# Patient Record
Sex: FEMALE | Race: WHITE | HISPANIC OR LATINO | Employment: FULL TIME | URBAN - METROPOLITAN AREA
[De-identification: names, ages, dates, MRNs, and addresses within clinical notes are randomized per-mention and may not be internally consistent; named-entity substitution may affect disease eponyms.]

---

## 2017-01-13 ENCOUNTER — OFFICE VISIT (OUTPATIENT)
Dept: MEDICAL GROUP | Facility: MEDICAL CENTER | Age: 22
End: 2017-01-13
Attending: INTERNAL MEDICINE
Payer: MEDICAID

## 2017-01-13 VITALS
WEIGHT: 134 LBS | OXYGEN SATURATION: 98 % | SYSTOLIC BLOOD PRESSURE: 112 MMHG | BODY MASS INDEX: 27.01 KG/M2 | TEMPERATURE: 98.6 F | RESPIRATION RATE: 20 BRPM | HEIGHT: 59 IN | DIASTOLIC BLOOD PRESSURE: 72 MMHG | HEART RATE: 88 BPM

## 2017-01-13 DIAGNOSIS — Z23 NEED FOR VACCINATION: ICD-10-CM

## 2017-01-13 DIAGNOSIS — F15.11 METHAMPHETAMINE ABUSE IN REMISSION (HCC): ICD-10-CM

## 2017-01-13 DIAGNOSIS — F41.9 ANXIETY: ICD-10-CM

## 2017-01-13 DIAGNOSIS — F43.10 PTSD (POST-TRAUMATIC STRESS DISORDER): ICD-10-CM

## 2017-01-13 DIAGNOSIS — Z11.59 SCREENING FOR VIRAL DISEASE: ICD-10-CM

## 2017-01-13 DIAGNOSIS — F33.1 MODERATE EPISODE OF RECURRENT MAJOR DEPRESSIVE DISORDER (HCC): ICD-10-CM

## 2017-01-13 PROCEDURE — 99204 OFFICE O/P NEW MOD 45 MIN: CPT | Mod: 25 | Performed by: INTERNAL MEDICINE

## 2017-01-13 PROCEDURE — 90686 IIV4 VACC NO PRSV 0.5 ML IM: CPT | Performed by: INTERNAL MEDICINE

## 2017-01-13 PROCEDURE — 99203 OFFICE O/P NEW LOW 30 MIN: CPT | Mod: 25 | Performed by: INTERNAL MEDICINE

## 2017-01-13 PROCEDURE — 90471 IMMUNIZATION ADMIN: CPT | Performed by: INTERNAL MEDICINE

## 2017-01-13 RX ORDER — CLONAZEPAM 0.5 MG/1
0.5 TABLET ORAL 2 TIMES DAILY
Qty: 30 TAB | Refills: 0 | Status: SHIPPED | OUTPATIENT
Start: 2017-01-13 | End: 2017-03-15 | Stop reason: SDUPTHER

## 2017-01-13 ASSESSMENT — PATIENT HEALTH QUESTIONNAIRE - PHQ9
CLINICAL INTERPRETATION OF PHQ2 SCORE: 6
SUM OF ALL RESPONSES TO PHQ QUESTIONS 1-9: 24
5. POOR APPETITE OR OVEREATING: 3 - NEARLY EVERY DAY
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
## 2017-01-13 NOTE — ASSESSMENT & PLAN NOTE
Patient reports baseline generalized anxiety, with intermittent panic attacks where she feels a racing heart, begins to sweat, cries, is overcome by fear, and feels like she might die. In the past month this is happened about twice. Triggers seem to be situational especially when she is out in public and shopping.

## 2017-01-13 NOTE — ASSESSMENT & PLAN NOTE
Patient reports previous history of IV methamphetamine use, now in remission. She last used in August 2015. She has never been screened for viral illnesses including hepatitis or HIV.
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
## 2017-02-21 ENCOUNTER — OFFICE VISIT (OUTPATIENT)
Dept: MEDICAL GROUP | Facility: MEDICAL CENTER | Age: 22
End: 2017-02-21
Attending: INTERNAL MEDICINE
Payer: MEDICAID

## 2017-02-21 VITALS
WEIGHT: 134 LBS | SYSTOLIC BLOOD PRESSURE: 122 MMHG | HEART RATE: 80 BPM | BODY MASS INDEX: 27.01 KG/M2 | RESPIRATION RATE: 12 BRPM | HEIGHT: 59 IN | OXYGEN SATURATION: 97 % | TEMPERATURE: 97.7 F | DIASTOLIC BLOOD PRESSURE: 80 MMHG

## 2017-02-21 DIAGNOSIS — F41.9 ANXIETY: ICD-10-CM

## 2017-02-21 DIAGNOSIS — F33.1 MODERATE EPISODE OF RECURRENT MAJOR DEPRESSIVE DISORDER (HCC): ICD-10-CM

## 2017-02-21 PROCEDURE — 99213 OFFICE O/P EST LOW 20 MIN: CPT | Performed by: INTERNAL MEDICINE

## 2017-02-21 PROCEDURE — 99214 OFFICE O/P EST MOD 30 MIN: CPT | Performed by: INTERNAL MEDICINE

## 2017-02-21 RX ORDER — BUPROPION HYDROCHLORIDE 150 MG/1
TABLET, EXTENDED RELEASE ORAL
Qty: 60 TAB | Refills: 1 | Status: SHIPPED | OUTPATIENT
Start: 2017-02-21 | End: 2017-03-15 | Stop reason: SDUPTHER

## 2017-02-21 NOTE — MR AVS SNAPSHOT
"        Isa Mcclure   2017 9:30 AM   Office Visit   MRN: [id number]    [facility]   Dept Phone:  [phone]    Description:  Female : 1995   Provider:  Theresa Goss M.D.           Reason for Visit     Follow-Up medication refill      Allergies as of 2017     No Known Allergies      You were diagnosed with     Moderate episode of recurrent major depressive disorder (CMS-[facility])   [9029075]         Vital Signs     Blood Pressure Pulse Temperature Respirations Height Weight    122/80 mmHg 80 36.5 °C (97.7 °F) 12 1.511 m (4' 11.49\") 60.782 kg (134 lb)    Body Mass Index Oxygen Saturation Breastfeeding? Smoking Status          26.62 kg/m2 97% No Former Smoker        Basic Information     Date Of Birth Sex Race Ethnicity Preferred Language    1995 Female  or   Origin (Finnish,Malawian,Sammarinese,Montenegrin, etc) English      Your appointments     Mar 15, 2017 10:10 AM   Established Patient with Theresa Goss M.D.   [facility] ([facility])    [address] [place] [id number]   [phone]           You will be receiving a confirmation call a few days before your appointment from our automated call confirmation system.              Problem List              ICD-10-CM Priority Class Noted - Resolved    Methamphetamine abuse in remission F15.10   2014 - Present    Moderate episode of recurrent major depressive disorder (CMS-HCC) F33.1   2017 - Present    Anxiety F41.9   2017 - Present    PTSD (post-traumatic stress disorder) F43.10   2017 - Present      Health Maintenance        Date Due Completion Dates    IMM HEP B VACCINE (1 of 3 - Primary Series) 1994 ---    IMM HEP A VACCINE (1 of 2 - Standard Series) 1995 ---    IMM HPV VACCINE (1 of 3 - Female 3 Dose Series) 2005 ---    IMM VARICELLA (CHICKENPOX) VACCINE (1 of 2 - 2 Dose Adolescent Series) 2007 ---    IMM DTaP/Tdap/Td Vaccine (1 - Tdap) " 6/23/2013 ---    PAP SMEAR 6/23/2015 ---            Current Immunizations     Influenza Vaccine Quad Inj (Pf) 1/13/2017      Below and/or attached are the medications your provider expects you to take. Review all of your home medications and newly ordered medications with your provider and/or pharmacist. Follow medication instructions as directed by your provider and/or pharmacist. Please keep your medication list with you and share with your provider. Update the information when medications are discontinued, doses are changed, or new medications (including over-the-counter products) are added; and carry medication information at all times in the event of emergency situations     Allergies:  No Known Allergies          Medications  Valid as of: February 21, 2017 -  9:38 AM    Generic Name Brand Name Tablet Size Instructions for use    BuPROPion HCl (TABLET SR 12 HR) WELLBUTRIN- MG Take one tablet by mouth for 4 days then increase to one tablet twice daily        ClonazePAM (Tab) KLONOPIN 0.5 MG Take 1 Tab by mouth 2 times a day.        .                 Medicines prescribed today were sent to:     [address] ([place]), [place] - [facility]    [facility] [place] [address]    Phone: [phone] Fax: [phone]    Open 24 Hours?: No      Medication refill instructions:       If your prescription bottle indicates you have medication refills left, it is not necessary to call your provider’s office. Please contact your pharmacy and they will refill your medication.    If your prescription bottle indicates you do not have any refills left, you may request refills at any time through one of the following ways: The online [employer] system (except Urgent Care), by calling your provider’s office, or by asking your pharmacy to contact your provider’s office with a refill request. Medication refills are processed only during regular business hours and may not be available until the next business day.  Your provider may request additional information or to have a follow-up visit with you prior to refilling your medication.   *Please Note: Medication refills are assigned a new Rx number when refilled electronically. Your pharmacy may indicate that no refills were authorized even though a new prescription for the same medication is available at the pharmacy. Please request the medicine by name with the pharmacy before contacting your provider for a refill.           Tectura Access Code: [email or web address]  Expires: 3/23/2017  9:38 AM    Tectura  A secure, online tool to manage your health information     [employer]’s Tectura® is a secure, online tool that connects you to your personalized health information from the privacy of your home -- day or night - making it very easy for you to manage your healthcare. Once the activation process is completed, you can even access your medical information using the Tectura adela, which is available for free in the Apple Adela store or Google Play store.     Tectura provides the following levels of access (as shown below):   My Chart Features   [facility] Doctor [facility]  Specialists [facility]  Urgent  Care Non-[facility]  Primary Care  Doctor   Email your healthcare team securely and privately 24/7 X X X    Manage appointments: schedule your next appointment; view details of past/upcoming appointments X      Request prescription refills. X      View recent personal medical records, including lab and immunizations X X X X   View health record, including health history, allergies, medications X X X X   Read reports about your outpatient visits, procedures, consult and ER notes X X X X   See your discharge summary, which is a recap of your hospital and/or ER visit that includes your diagnosis, lab results, and care plan. X X       How to register for Tectura:  1. Go to  [email or web address].  2. Click on the Sign Up Now box, which takes you to the New Member Sign Up page. You  will need to provide the following information:  a. Enter your [employer] Access Code exactly as it appears at the top of this page. (You will not need to use this code after you’ve completed the sign-up process. If you do not sign up before the expiration date, you must request a new code.)   b. Enter your date of birth.   c. Enter your home email address.   d. Click Submit, and follow the next screen’s instructions.  3. Create a [employer] ID. This will be your [employer] login ID and cannot be changed, so think of one that is secure and easy to remember.  4. Create a [employer] password. You can change your password at any time.  5. Enter your Password Reset Question and Answer. This can be used at a later time if you forget your password.   6. Enter your e-mail address. This allows you to receive e-mail notifications when new information is available in [employer].  7. Click Sign Up. You can now view your health information.    For assistance activating your [employer] account, call [phone]

## 2017-02-21 NOTE — PROGRESS NOTES
"Subjective:   Isa Mcclure is a 21 y.o. female here today for follow-up anxiety and depression    Moderate episode of recurrent major depressive disorder (CMS-HCC)  Patient was started on Zoloft 50 mg daily at her last visit. Since starting the medication, she noticed that she was having more difficulty sleeping despite taking it in the morning. She also noticed a lot of difficulty eating and nausea, which stopped when she stopped taking the medication. She did take it for 3 weeks. She reports that her depression symptoms are still very severe. She has started seeing a therapist which has been helpful, but she wants to give it more time. She is seeing them once weekly.  She is interested in trying a different medication for depression today.     Anxiety  Patient reports occasional panic attacks, has needed to use the Klonopin about 4 times in the past month. Still has most of the prescription remaining. Reports good efficacy of the medication.         Current medicines (including changes today)  Current Outpatient Prescriptions   Medication Sig Dispense Refill   • buPROPion SR (WELLBUTRIN-SR) 150 MG TABLET SR 12 HR sustained-release tablet Take one tablet by mouth for 4 days then increase to one tablet twice daily 60 Tab 1   • clonazepam (KLONOPIN) 0.5 MG Tab Take 1 Tab by mouth 2 times a day. 30 Tab 0     No current facility-administered medications for this visit.     She  has a past medical history of Drug abuse, IV and Depression.    ROS   Denies abdominal pain, diarrhea, constipation  As above in HPI     Objective:     Blood pressure 122/80, pulse 80, temperature 36.5 °C (97.7 °F), resp. rate 12, height 1.511 m (4' 11.49\"), weight 60.782 kg (134 lb), SpO2 97 %, not currently breastfeeding. Body mass index is 26.62 kg/(m^2).   Physical Exam:  Constitutional: Alert, no distress.  Skin: Warm, dry, good turgor, no rashes in visible areas.  Eye: Equal, round and reactive, conjunctiva clear, lids normal.  Psych: " Alert and oriented x3, somewhat flat affect and mood.    Assessment and Plan:   The following treatment plan was discussed    1. Moderate episode of recurrent major depressive disorder (CMS-HCC)  Given the patient had significant side effects after taking Zoloft, including insomnia and GI issues, have discontinued this medication. She has also tried citalopram and Lexapro in the past although not for very long durations, and does not remember benefit. Given this, we will move outside of the SSRI class. Will try on Wellbutrin. Follow-up in 3 weeks. Instructed patient to call if she notices any of the most common side effects associated with Wellbutrin, which we reviewed today. She will continue to attend her therapy sessions, which she feels are helpful, especially for her PTSD.  -Start Wellbutrin  mg daily ×4 days then increase to twice daily  -Follow up in 2 weeks  -Continue therapy    2. Anxiety  Panic attacks are well controlled with as needed clonazepam, patient with minimal use over the past month. Still with plenty left over from her previous prescription. Will continue until we find an effective antidepressant.   -Continue clonazepam 0.5 mg as needed  -Continue therapy    Followup: Return in about 3 weeks (around 3/14/2017) for depression.

## 2017-02-21 NOTE — ASSESSMENT & PLAN NOTE
Patient reports occasional panic attacks, has needed to use the Klonopin about 4 times in the past month. Still has most of the prescription remaining. Reports good efficacy of the medication.

## 2017-02-21 NOTE — ASSESSMENT & PLAN NOTE
Patient was started on Zoloft 50 mg daily at her last visit. Since starting the medication, she noticed that she was having more difficulty sleeping despite taking it in the morning. She also noticed a lot of difficulty eating and nausea, which stopped when she stopped taking the medication. She did take it for 3 weeks. She reports that her depression symptoms are still very severe. She has started seeing a therapist which has been helpful, but she wants to give it more time. She is seeing them once weekly.  She is interested in trying a different medication for depression today.

## 2017-03-15 ENCOUNTER — OFFICE VISIT (OUTPATIENT)
Dept: MEDICAL GROUP | Facility: MEDICAL CENTER | Age: 22
End: 2017-03-15
Attending: INTERNAL MEDICINE
Payer: MEDICAID

## 2017-03-15 VITALS
HEART RATE: 88 BPM | OXYGEN SATURATION: 100 % | TEMPERATURE: 97.5 F | RESPIRATION RATE: 14 BRPM | WEIGHT: 135.2 LBS | DIASTOLIC BLOOD PRESSURE: 62 MMHG | SYSTOLIC BLOOD PRESSURE: 100 MMHG | HEIGHT: 59 IN | BODY MASS INDEX: 27.26 KG/M2

## 2017-03-15 DIAGNOSIS — Z23 NEED FOR VACCINATION: ICD-10-CM

## 2017-03-15 DIAGNOSIS — F41.9 ANXIETY: ICD-10-CM

## 2017-03-15 DIAGNOSIS — F33.1 MODERATE EPISODE OF RECURRENT MAJOR DEPRESSIVE DISORDER (HCC): ICD-10-CM

## 2017-03-15 PROCEDURE — 90471 IMMUNIZATION ADMIN: CPT | Performed by: INTERNAL MEDICINE

## 2017-03-15 PROCEDURE — 99214 OFFICE O/P EST MOD 30 MIN: CPT | Mod: 25 | Performed by: INTERNAL MEDICINE

## 2017-03-15 PROCEDURE — 99212 OFFICE O/P EST SF 10 MIN: CPT | Performed by: INTERNAL MEDICINE

## 2017-03-15 PROCEDURE — 90715 TDAP VACCINE 7 YRS/> IM: CPT | Performed by: INTERNAL MEDICINE

## 2017-03-15 RX ORDER — BUPROPION HYDROCHLORIDE 150 MG/1
150 TABLET, EXTENDED RELEASE ORAL 2 TIMES DAILY
Qty: 60 TAB | Refills: 6 | Status: SHIPPED | OUTPATIENT
Start: 2017-03-15 | End: 2017-10-04

## 2017-03-15 RX ORDER — CLONAZEPAM 0.5 MG/1
0.5 TABLET ORAL 2 TIMES DAILY PRN
Qty: 30 TAB | Refills: 0 | Status: SHIPPED | OUTPATIENT
Start: 2017-03-15 | End: 2017-10-04

## 2017-03-15 NOTE — ASSESSMENT & PLAN NOTE
Patient reports she is doing well with the Wellbutrin. She reports improvement in her mood and has been able to do more things that she enjoys. Her fatigue has also improved. She has been taking the 150 mg dose twice daily for 3 weeks now. She continues to attend therapy which she also feels is helpful.

## 2017-03-15 NOTE — MR AVS SNAPSHOT
"        Isa Mcclure   3/15/2017 10:10 AM   Office Visit   MRN: [id number]    [facility]   Dept Phone:  [phone]    Description:  Female : 1995   Provider:  Theresa oGss M.D.           Reason for Visit     Follow-Up medication review       Allergies as of 3/15/2017     No Known Allergies      You were diagnosed with     Anxiety   [536255]       Need for vaccination   [299547]       Moderate episode of recurrent major depressive disorder (CMS-[facility])   [3725592]         Vital Signs     Blood Pressure Pulse Temperature Respirations Height Weight    100/62 mmHg 88 36.4 °C (97.5 °F) 14 1.511 m (4' 11.49\") 61.326 kg (135 lb 3.2 oz)    Body Mass Index Oxygen Saturation Last Menstrual Period Breastfeeding? Smoking Status       26.86 kg/m2 100% 02/10/2017 No Former Smoker       Basic Information     Date Of Birth Sex Race Ethnicity Preferred Language    1995 Female  or   Origin (English,Romanian,Taiwanese,Chandler, etc) English      Problem List              ICD-10-CM Priority Class Noted - Resolved    Methamphetamine abuse in remission F15.10   2014 - Present    Moderate episode of recurrent major depressive disorder (CMS-[facility]) F33.1   2017 - Present    Anxiety F41.9   2017 - Present    PTSD (post-traumatic stress disorder) F43.10   2017 - Present      Health Maintenance        Date Due Completion Dates    IMM HEP B VACCINE (1 of 3 - Primary Series) 1994 ---    IMM HEP A VACCINE (1 of 2 - Standard Series) 1995 ---    IMM HPV VACCINE (1 of 3 - Female 3 Dose Series) 2005 ---    IMM VARICELLA (CHICKENPOX) VACCINE (1 of 2 - 2 Dose Adolescent Series) 2007 ---    IMM DTaP/Tdap/Td Vaccine (1 - Tdap) 2013 ---    PAP SMEAR 2015 ---            Current Immunizations     Influenza Vaccine Quad Inj (Pf) 2017    Tdap Vaccine 3/15/2017      Below and/or attached are the medications your provider expects you to take. Review " all of your home medications and newly ordered medications with your provider and/or pharmacist. Follow medication instructions as directed by your provider and/or pharmacist. Please keep your medication list with you and share with your provider. Update the information when medications are discontinued, doses are changed, or new medications (including over-the-counter products) are added; and carry medication information at all times in the event of emergency situations     Allergies:  No Known Allergies          Medications  Valid as of: March 15, 2017 - 10:48 AM    Generic Name Brand Name Tablet Size Instructions for use    BuPROPion HCl (TABLET SR 12 HR) WELLBUTRIN- MG Take 1 Tab by mouth 2 times a day.        ClonazePAM (Tab) KLONOPIN 0.5 MG Take 1 Tab by mouth 2 times a day as needed.        .                 Medicines prescribed today were sent to:     [address] ([address]), [place] [address]    [facility] [place] ([place]) [place] [address]    Phone: [phone] Fax: [phone]    Open 24 Hours?: No      Medication refill instructions:       If your prescription bottle indicates you have medication refills left, it is not necessary to call your provider’s office. Please contact your pharmacy and they will refill your medication.    If your prescription bottle indicates you do not have any refills left, you may request refills at any time through one of the following ways: The online Pager system (except Urgent Care), by calling your provider’s office, or by asking your pharmacy to contact your provider’s office with a refill request. Medication refills are processed only during regular business hours and may not be available until the next business day. Your provider may request additional information or to have a follow-up visit with you prior to refilling your medication.   *Please Note: Medication refills are assigned a new Rx number when refilled electronically. Your pharmacy may  indicate that no refills were authorized even though a new prescription for the same medication is available at the pharmacy. Please request the medicine by name with the pharmacy before contacting your provider for a refill.           Active [employer] Access Code: Activation code not generated  Current [employer] Status: Active

## 2017-03-15 NOTE — ASSESSMENT & PLAN NOTE
Patient has had about 3 panic attacks since her last visit. She still has some clonazepam left over from her initial prescription although she is running out. As it on an as-needed basis, does not use alcohol or drugs. She finds it is very effective in relieving her anxiety symptoms.

## 2017-03-15 NOTE — PROGRESS NOTES
"Subjective:   Isa Mcclure is a 21 y.o. female here today for follow-up anxiety and depression    Moderate episode of recurrent major depressive disorder (CMS-HCC)  Patient reports she is doing well with the Wellbutrin. She reports improvement in her mood and has been able to do more things that she enjoys. Her fatigue has also improved. She has been taking the 150 mg dose twice daily for 3 weeks now. She continues to attend therapy which she also feels is helpful.    Anxiety  Patient has had about 3 panic attacks since her last visit. She still has some clonazepam left over from her initial prescription although she is running out. As it on an as-needed basis, does not use alcohol or drugs. She finds it is very effective in relieving her anxiety symptoms.         Current medicines (including changes today)  Current Outpatient Prescriptions   Medication Sig Dispense Refill   • buPROPion SR (WELLBUTRIN-SR) 150 MG TABLET SR 12 HR sustained-release tablet Take 1 Tab by mouth 2 times a day. 60 Tab 6   • clonazepam (KLONOPIN) 0.5 MG Tab Take 1 Tab by mouth 2 times a day as needed. 30 Tab 0     No current facility-administered medications for this visit.     She  has a past medical history of Drug abuse, IV and Depression.    ROS   As above in HPI     Objective:     Blood pressure 100/62, pulse 88, temperature 36.4 °C (97.5 °F), resp. rate 14, height 1.511 m (4' 11.49\"), weight 61.326 kg (135 lb 3.2 oz), last menstrual period 02/10/2017, SpO2 100 %, not currently breastfeeding. Body mass index is 26.86 kg/(m^2).   Physical Exam:  Constitutional: Alert, no distress.  Skin: Warm, dry, good turgor, no rashes in visible areas.  Eye: conjunctiva clear, lids normal.  Psych: Alert and oriented x3, normal affect, mood improved      Assessment and Plan:   The following treatment plan was discussed    1. Anxiety  Improving with as needed clonazepam and Wellbutrin as well as therapy. Refill given for clonazepam today. Patient " uses sparingly and appropriately.  - clonazepam (KLONOPIN) 0.5 MG Tab; Take 1 Tab by mouth 2 times a day as needed.  Dispense: 30 Tab; Refill: 0  -Wellbutrin  mg twice a day  -Continue therapy    2. Need for vaccination  - Tdap =>6yo IM    3. Moderate episode of recurrent major depressive disorder (CMS-HCC)  Improving with Wellbutrin, which she has been on for 3 weeks now. We will continue at current dose. Discussed that a slight dose increase to 200 mg twice a day is possible if we need to in the future. Patient will return for follow-up in 3 months or sooner if needed.  -Wellbutrin  mg twice a day  -Continue therapy  -Follow up in 3 months    4. HCM  Last Pap smear was through her previous OB/GYN when she was pregnant, which was 8 months ago. Patient reports it was normal. Records requested today.      Followup: Return in about 3 months (around 6/15/2017) for anxiety, depression.

## 2017-04-05 ENCOUNTER — OFFICE VISIT (OUTPATIENT)
Dept: MEDICAL GROUP | Facility: MEDICAL CENTER | Age: 22
End: 2017-04-05
Attending: INTERNAL MEDICINE
Payer: MEDICAID

## 2017-04-05 VITALS
SYSTOLIC BLOOD PRESSURE: 116 MMHG | HEIGHT: 59 IN | OXYGEN SATURATION: 97 % | WEIGHT: 136.2 LBS | HEART RATE: 100 BPM | RESPIRATION RATE: 14 BRPM | BODY MASS INDEX: 27.46 KG/M2 | DIASTOLIC BLOOD PRESSURE: 82 MMHG | TEMPERATURE: 97.7 F

## 2017-04-05 DIAGNOSIS — L03.319 CELLULITIS AND ABSCESS OF TRUNK: ICD-10-CM

## 2017-04-05 DIAGNOSIS — L02.219 CELLULITIS AND ABSCESS OF TRUNK: ICD-10-CM

## 2017-04-05 PROCEDURE — 99212 OFFICE O/P EST SF 10 MIN: CPT | Performed by: INTERNAL MEDICINE

## 2017-04-05 PROCEDURE — 99214 OFFICE O/P EST MOD 30 MIN: CPT | Performed by: INTERNAL MEDICINE

## 2017-04-05 RX ORDER — SULFAMETHOXAZOLE AND TRIMETHOPRIM 800; 160 MG/1; MG/1
1 TABLET ORAL 2 TIMES DAILY
Qty: 14 TAB | Refills: 0 | Status: SHIPPED | OUTPATIENT
Start: 2017-04-05 | End: 2017-04-12

## 2017-04-05 ASSESSMENT — PAIN SCALES - GENERAL: PAINLEVEL: 4=SLIGHT-MODERATE PAIN

## 2017-04-05 NOTE — PROGRESS NOTES
"Subjective:   Isa Mcclure is a 21 y.o. female here today for a painful bump under her arm.    Cellulitis and abscess of trunk  Patient reports about 3 days ago she had a razor bump underneath her armpit on the left after shaving. Over the next 2 days, it grew in size and became very painful. It is red. She reports expressing a small amount of pus from it. She has had MRSA related abscesses in the past, most recently over her chest over 6 months ago. She denies fevers and chills. The affected area is very painful.        Current medicines (including changes today)  Current Outpatient Prescriptions   Medication Sig Dispense Refill   • sulfamethoxazole-trimethoprim (BACTRIM DS) 800-160 MG tablet Take 1 Tab by mouth 2 times a day for 7 days. 14 Tab 0   • buPROPion SR (WELLBUTRIN-SR) 150 MG TABLET SR 12 HR sustained-release tablet Take 1 Tab by mouth 2 times a day. 60 Tab 6   • clonazepam (KLONOPIN) 0.5 MG Tab Take 1 Tab by mouth 2 times a day as needed. 30 Tab 0     No current facility-administered medications for this visit.     She  has a past medical history of Drug abuse, IV and Depression.    ROS   As above in HPI     Objective:     Blood pressure 116/82, pulse 100, temperature 36.5 °C (97.7 °F), resp. rate 14, height 1.511 m (4' 11.49\"), weight 61.78 kg (136 lb 3.2 oz), last menstrual period 03/05/2017, SpO2 97 %, not currently breastfeeding. Body mass index is 27.06 kg/(m^2).   Physical Exam:  Constitutional: Alert, no distress.  Skin: Warm, dry, good turgor, approx 3 cm x 2 cm indurated red, erythematous area at lower lateral aspect of left arm pit that is firm without fluctuance or drainable abscess.  Eye: conjunctiva clear, lids normal.  Psych: Alert and oriented x3, normal affect and mood.      Assessment and Plan:   The following treatment plan was discussed    1. Cellulitis and abscess of trunk  Suspect secondary to shaving. No fluctuance palpable. Area is still very firm and indurated. We'll treat with " antibiotics at this point with MRSA coverage. Discussed with patient that this area may form an abscess, and if it becomes more painful, she develops fevers or chills, or it starts to drain, that she should give us a call. If this occurs over the weekend, she needs to go to the emergency room for possible incision and drainage of the area. We discussed avoiding shaving armpits, legs, groin region, using alternative methods for hair removal.  -Bactrim DS one tablet twice daily for 7 days      Followup: Return if symptoms worsen or fail to improve.

## 2017-04-05 NOTE — ASSESSMENT & PLAN NOTE
Patient reports about 3 days ago she had a razor bump underneath her armpit on the left after shaving. Over the next 2 days, it grew in size and became very painful. It is red. She reports expressing a small amount of pus from it. She has had MRSA related abscesses in the past, most recently over her chest over 6 months ago. She denies fevers and chills. The affected area is very painful.

## 2017-04-05 NOTE — MR AVS SNAPSHOT
"        Isaalison Mcclure   2017 8:10 AM   Office Visit   MRN: [id number]    [facility]   Dept Phone:  [phone]    Description:  Female : 1995   Provider:  Theresa Goss M.D.           Reason for Visit     Follow-Up lump under left arm (possible MRSA)       Allergies as of 2017     No Known Allergies      You were diagnosed with     Cellulitis and abscess of trunk   [682.2.ICD-9-CM]         Vital Signs     Blood Pressure Pulse Temperature Respirations Height Weight    116/82 mmHg 100 36.5 °C (97.7 °F) 14 1.511 m (4' 11.49\") 61.78 kg (136 lb 3.2 oz)    Body Mass Index Oxygen Saturation Last Menstrual Period Breastfeeding? Smoking Status       27.06 kg/m2 97% 2017 No Former Smoker       Basic Information     Date Of Birth Sex Race Ethnicity Preferred Language    1995 Female  or   Origin (Albanian,North Korean,Malawian,Tajik, etc) English      Problem List              ICD-10-CM Priority Class Noted - Resolved    Methamphetamine abuse in remission F15.10   2014 - Present    Moderate episode of recurrent major depressive disorder (CMS-HCC) F33.1   2017 - Present    Anxiety F41.9   2017 - Present    PTSD (post-traumatic stress disorder) F43.10   2017 - Present    Cellulitis and abscess of trunk L03.319, L02.219   2017 - Present      Health Maintenance        Date Due Completion Dates    IMM HEP B VACCINE (1 of 3 - Primary Series) 1994 ---    IMM HEP A VACCINE (1 of 2 - Standard Series) 1995 ---    IMM HPV VACCINE (1 of 3 - Female 3 Dose Series) 2005 ---    IMM VARICELLA (CHICKENPOX) VACCINE (1 of 2 - 2 Dose Adolescent Series) 2007 ---    PAP SMEAR 2015 ---    IMM DTaP/Tdap/Td Vaccine (2 - Td) 3/15/2027 3/15/2017            Current Immunizations     Influenza Vaccine Quad Inj (Pf) 2017    Tdap Vaccine 3/15/2017      Below and/or attached are the medications your provider expects you to take. Review " all of your home medications and newly ordered medications with your provider and/or pharmacist. Follow medication instructions as directed by your provider and/or pharmacist. Please keep your medication list with you and share with your provider. Update the information when medications are discontinued, doses are changed, or new medications (including over-the-counter products) are added; and carry medication information at all times in the event of emergency situations     Allergies:  No Known Allergies          Medications  Valid as of: April 05, 2017 -  9:01 AM    Generic Name Brand Name Tablet Size Instructions for use    BuPROPion HCl (TABLET SR 12 HR) WELLBUTRIN- MG Take 1 Tab by mouth 2 times a day.        ClonazePAM (Tab) KLONOPIN 0.5 MG Take 1 Tab by mouth 2 times a day as needed.        Sulfamethoxazole-Trimethoprim (Tab) BACTRIM -160 MG Take 1 Tab by mouth 2 times a day for 7 days.        .                 Medicines prescribed today were sent to:     [facility] [address] [place] [address] (S), [place] - [address]    [address] ([place] [place] [address]    Phone: [phone] Fax: [phone]    Open 24 Hours?: No      Medication refill instructions:       If your prescription bottle indicates you have medication refills left, it is not necessary to call your provider’s office. Please contact your pharmacy and they will refill your medication.    If your prescription bottle indicates you do not have any refills left, you may request refills at any time through one of the following ways: The online WebNotes system (except Urgent Care), by calling your provider’s office, or by asking your pharmacy to contact your provider’s office with a refill request. Medication refills are processed only during regular business hours and may not be available until the next business day. Your provider may request additional information or to have a follow-up visit with you prior to refilling your medication.      *Please Note: Medication refills are assigned a new Rx number when refilled electronically. Your pharmacy may indicate that no refills were authorized even though a new prescription for the same medication is available at the pharmacy. Please request the medicine by name with the pharmacy before contacting your provider for a refill.           [employer] Access Code: Activation code not generated  Current [employer] Status: Active

## 2017-05-16 ENCOUNTER — OFFICE VISIT (OUTPATIENT)
Dept: MEDICAL GROUP | Facility: MEDICAL CENTER | Age: 22
End: 2017-05-16
Attending: INTERNAL MEDICINE
Payer: MEDICAID

## 2017-05-16 VITALS
TEMPERATURE: 98.7 F | HEART RATE: 120 BPM | RESPIRATION RATE: 16 BRPM | OXYGEN SATURATION: 98 % | SYSTOLIC BLOOD PRESSURE: 106 MMHG | BODY MASS INDEX: 27.7 KG/M2 | HEIGHT: 59 IN | DIASTOLIC BLOOD PRESSURE: 72 MMHG | WEIGHT: 137.4 LBS

## 2017-05-16 DIAGNOSIS — R21 RASH OF HANDS: ICD-10-CM

## 2017-05-16 DIAGNOSIS — J06.9 VIRAL URI WITH COUGH: ICD-10-CM

## 2017-05-16 PROBLEM — L02.219 CELLULITIS AND ABSCESS OF TRUNK: Status: RESOLVED | Noted: 2017-04-05 | Resolved: 2017-05-16

## 2017-05-16 PROBLEM — L03.319 CELLULITIS AND ABSCESS OF TRUNK: Status: RESOLVED | Noted: 2017-04-05 | Resolved: 2017-05-16

## 2017-05-16 PROCEDURE — 99214 OFFICE O/P EST MOD 30 MIN: CPT | Performed by: INTERNAL MEDICINE

## 2017-05-16 PROCEDURE — 99213 OFFICE O/P EST LOW 20 MIN: CPT | Performed by: INTERNAL MEDICINE

## 2017-05-16 RX ORDER — BENZONATATE 100 MG/1
100 CAPSULE ORAL 3 TIMES DAILY PRN
Qty: 30 CAP | Refills: 0 | Status: SHIPPED | OUTPATIENT
Start: 2017-05-16 | End: 2017-10-04

## 2017-05-16 RX ORDER — GUAIFENESIN 600 MG/1
600 TABLET, EXTENDED RELEASE ORAL EVERY 12 HOURS
Qty: 20 TAB | Refills: 0 | Status: SHIPPED | OUTPATIENT
Start: 2017-05-16 | End: 2017-10-04

## 2017-05-16 RX ORDER — CODEINE PHOSPHATE/GUAIFENESIN 10-100MG/5
5 LIQUID (ML) ORAL 3 TIMES DAILY PRN
Qty: 60 ML | Refills: 0 | Status: SHIPPED | OUTPATIENT
Start: 2017-05-16 | End: 2017-10-04

## 2017-05-16 NOTE — ASSESSMENT & PLAN NOTE
Patient presents today with 3 day history of productive cough, fevers to 102, runny nose, sore throat, nasal congestion. She has had several sick contacts with the same illness. She has been taking DayQuil and NyQuil with minimal relief. She has had some vomiting related to severe coughing. She denies difficulty breathing, shortness of breath, chest pain, diarrhea.

## 2017-05-16 NOTE — MR AVS SNAPSHOT
"        Isa Mcclure   2017 11:30 AM   Office Visit   MRN: [id number]    [facility]   Dept Phone:  [phone]    Description:  Female : 1995   Provider:  Theresa Goss M.D.           Reason for Visit     Follow-Up cold symptoms, finger pain       Allergies as of 2017     No Known Allergies      You were diagnosed with     Viral URI with cough   [716050]         Vital Signs     Blood Pressure Pulse Temperature Respirations Height Weight    106/72 mmHg 120 37.1 °C (98.7 °F) 16 1.511 m (4' 11.49\") 62.324 kg (137 lb 6.4 oz)    Body Mass Index Oxygen Saturation Last Menstrual Period Breastfeeding? Smoking Status       27.30 kg/m2 98% 05/10/2017 No Former Smoker       Basic Information     Date Of Birth Sex Race Ethnicity Preferred Language    1995 Female  or   Origin (Japanese,Luxembourger,Honduran,Chandler, etc) English      Problem List              ICD-10-CM Priority Class Noted - Resolved    Methamphetamine abuse in remission F15.10   2014 - Present    Moderate episode of recurrent major depressive disorder (CMS-HCC) F33.1   2017 - Present    Anxiety F41.9   2017 - Present    PTSD (post-traumatic stress disorder) F43.10   2017 - Present    Viral URI with cough J06.9, B97.89   2017 - Present      Health Maintenance        Date Due Completion Dates    IMM HEP B VACCINE (1 of 3 - Primary Series) 1994 ---    IMM HEP A VACCINE (1 of 2 - Standard Series) 1995 ---    IMM HPV VACCINE (1 of 3 - Female 3 Dose Series) 2005 ---    IMM VARICELLA (CHICKENPOX) VACCINE (1 of 2 - 2 Dose Adolescent Series) 2007 ---    PAP SMEAR 2015 ---    IMM DTaP/Tdap/Td Vaccine (2 - Td) 3/15/2027 3/15/2017            Current Immunizations     Influenza Vaccine Quad Inj (Pf) 2017    Tdap Vaccine 3/15/2017      Below and/or attached are the medications your provider expects you to take. Review all of your home medications and " newly ordered medications with your provider and/or pharmacist. Follow medication instructions as directed by your provider and/or pharmacist. Please keep your medication list with you and share with your provider. Update the information when medications are discontinued, doses are changed, or new medications (including over-the-counter products) are added; and carry medication information at all times in the event of emergency situations     Allergies:  No Known Allergies          Medications  Valid as of: May 16, 2017 - 11:56 AM    Generic Name Brand Name Tablet Size Instructions for use    Benzonatate (Cap) TESSALON 100 MG Take 1 Cap by mouth 3 times a day as needed for Cough.        BuPROPion HCl (TABLET SR 12 HR) WELLBUTRIN- MG Take 1 Tab by mouth 2 times a day.        ClonazePAM (Tab) KLONOPIN 0.5 MG Take 1 Tab by mouth 2 times a day as needed.        GuaiFENesin (TABLET SR 12 HR) MUCINEX 600 MG Take 1 Tab by mouth every 12 hours.        Guaifenesin-Codeine (Syrup) TUSSI-ORGANIDIN -10 MG/5ML Take 5 mL by mouth 3 times a day as needed for Cough.        Hydrocortisone (Cream) hydrocortisone 2.5 % Apply twice daily to rash on finger until resolved        .                 Medicines prescribed today were sent to:     [facility] [address] [place] [address] (S), [place] [id number] [facility] (S) [place] [address]    Phone: [phone] Fax: [phone]    Open 24 Hours?: No      Medication refill instructions:       If your prescription bottle indicates you have medication refills left, it is not necessary to call your provider’s office. Please contact your pharmacy and they will refill your medication.    If your prescription bottle indicates you do not have any refills left, you may request refills at any time through one of the following ways: The online [facility] system (except Urgent Care), by calling your provider’s office, or by asking your pharmacy to contact your provider’s office with a refill  request. Medication refills are processed only during regular business hours and may not be available until the next business day. Your provider may request additional information or to have a follow-up visit with you prior to refilling your medication.   *Please Note: Medication refills are assigned a new Rx number when refilled electronically. Your pharmacy may indicate that no refills were authorized even though a new prescription for the same medication is available at the pharmacy. Please request the medicine by name with the pharmacy before contacting your provider for a refill.           [employer] Access Code: Activation code not generated  Current [employer] Status: Active

## 2017-05-16 NOTE — PROGRESS NOTES
"Subjective:   Isa Mcclure is a 21 y.o. female here today for flulike symptoms, finger rash    Viral URI with cough  Patient presents today with 3 day history of productive cough, fevers to 102, runny nose, sore throat, nasal congestion. She has had several sick contacts with the same illness. She has been taking DayQuil and NyQuil with minimal relief. She has had some vomiting related to severe coughing. She denies difficulty breathing, shortness of breath, chest pain, diarrhea.    Rash of hands  Patient reports several months of a dry peeling cracking red rash on her middle finger of her left hand. The rash has not been present anywhere else. She has been using strong moisturizers which has not been helpful. She has not noticed it anywhere else on her body.         Current medicines (including changes today)  Current Outpatient Prescriptions   Medication Sig Dispense Refill   • benzonatate (TESSALON) 100 MG Cap Take 1 Cap by mouth 3 times a day as needed for Cough. 30 Cap 0   • guaifenesin LA (MUCINEX) 600 MG TABLET SR 12 HR Take 1 Tab by mouth every 12 hours. 20 Tab 0   • hydrocortisone 2.5 % Cream topical cream Apply twice daily to rash on finger until resolved 1 Tube 1   • guaifenesin-codeine (TUSSI-ORGANIDIN NR) 100-10 MG/5ML syrup Take 5 mL by mouth 3 times a day as needed for Cough. 60 mL 0   • buPROPion SR (WELLBUTRIN-SR) 150 MG TABLET SR 12 HR sustained-release tablet Take 1 Tab by mouth 2 times a day. 60 Tab 6   • clonazepam (KLONOPIN) 0.5 MG Tab Take 1 Tab by mouth 2 times a day as needed. 30 Tab 0     No current facility-administered medications for this visit.     She  has a past medical history of Drug abuse, IV and Depression.    ROS   As above in HPI     Objective:     Blood pressure 106/72, pulse 120, temperature 37.1 °C (98.7 °F), resp. rate 16, height 1.511 m (4' 11.49\"), weight 62.324 kg (137 lb 6.4 oz), last menstrual period 05/10/2017, SpO2 98 %, not currently breastfeeding. Body mass index " is 27.3 kg/(m^2).   Physical Exam:  Constitutional: Alert, no distress.  Skin: Warm, dry, good turgor, red scaling erythematous plaque over the palmar surface of left third finger with some cracking of the skin, extending just past the PIP joint and down to the MCP joint.  Eye: Equal, round and reactive, conjunctiva clear, lids normal.  ENMT: Lips without lesions, good dentition, oropharynx mildly injected, turbinates swollen bilaterally, TMs clear bilaterally.  Neck: Trachea midline, no masses, no thyromegaly. No cervical or supraclavicular lymphadenopathy  Respiratory: Unlabored respiratory effort, lungs clear to auscultation, no wheezes, no ronchi.  Cardiovascular: Mildly tachycardic, Normal S1, S2, no murmur, no edema.  Psych: Alert and oriented x3, normal affect and mood.      Assessment and Plan:   The following treatment plan was discussed    1. Viral URI with cough  Symptoms consistent with a viral upper respiratory infection. Discussed conservative care with patient. We will start her on Tessalon Perles for the cough, Mucinex twice daily for the next week, and nighttime codeine cough syrup. Recommended OTC Afrin for a nasal decongestant. Discussed that if symptoms are no better in the next week, she should call clinic and she may need antibiotics at that point.  - benzonatate (TESSALON) 100 MG Cap; Take 1 Cap by mouth 3 times a day as needed for Cough.  Dispense: 30 Cap; Refill: 0  - guaifenesin LA (MUCINEX) 600 MG TABLET SR 12 HR; Take 1 Tab by mouth every 12 hours.  Dispense: 20 Tab; Refill: 0  - guaifenesin-codeine (TUSSI-ORGANIDIN NR) 100-10 MG/5ML syrup; Take 5 mL by mouth 3 times a day as needed for Cough.  Dispense: 60 mL; Refill: 0  -Afrin when necessary    2. Rash of hands  Rash looks like eczema, however it has not responded to any of the topical therapies piece the patient has tried. I will try some hydrocortisone cream and if no improvement or worsening, would consider treating as a fungal  infection.  - hydrocortisone 2.5 % Cream topical cream; Apply twice daily to rash on finger until resolved  Dispense: 1 Tube; Refill: 1      Followup: Return if symptoms worsen or fail to improve.

## 2017-05-16 NOTE — Clinical Note
May 16, 2017         Patient: Isa Mcclure   YOB: 1995   Date of Visit: 5/16/2017           To Whom it May Concern:    Isa Mcclure was seen in my clinic on 5/16/2017. She may return to work on 5/18/17.    If you have any questions or concerns, please don't hesitate to call.        Sincerely,           Theresa Goss M.D.  Electronically Signed

## 2017-06-14 ENCOUNTER — OFFICE VISIT (OUTPATIENT)
Dept: MEDICAL GROUP | Facility: MEDICAL CENTER | Age: 22
End: 2017-06-14
Attending: INTERNAL MEDICINE
Payer: MEDICAID

## 2017-06-14 VITALS
SYSTOLIC BLOOD PRESSURE: 118 MMHG | OXYGEN SATURATION: 97 % | RESPIRATION RATE: 16 BRPM | HEART RATE: 86 BPM | WEIGHT: 138 LBS | TEMPERATURE: 97.2 F | HEIGHT: 59 IN | BODY MASS INDEX: 27.82 KG/M2 | DIASTOLIC BLOOD PRESSURE: 78 MMHG

## 2017-06-14 DIAGNOSIS — Z30.013 INITIATION OF DEPO PROVERA: ICD-10-CM

## 2017-06-14 LAB
INT CON NEG: NEGATIVE
INT CON POS: POSITIVE
POC URINE PREGNANCY TEST: NEGATIVE

## 2017-06-14 PROCEDURE — 81025 URINE PREGNANCY TEST: CPT | Performed by: INTERNAL MEDICINE

## 2017-06-14 PROCEDURE — 700111 HCHG RX REV CODE 636 W/ 250 OVERRIDE (IP): Performed by: INTERNAL MEDICINE

## 2017-06-14 PROCEDURE — 96372 THER/PROPH/DIAG INJ SC/IM: CPT | Performed by: INTERNAL MEDICINE

## 2017-06-14 PROCEDURE — 99213 OFFICE O/P EST LOW 20 MIN: CPT | Mod: 25 | Performed by: INTERNAL MEDICINE

## 2017-06-14 PROCEDURE — 99214 OFFICE O/P EST MOD 30 MIN: CPT | Performed by: INTERNAL MEDICINE

## 2017-06-14 RX ORDER — MEDROXYPROGESTERONE ACETATE 150 MG/ML
150 INJECTION, SUSPENSION INTRAMUSCULAR ONCE
Status: COMPLETED | OUTPATIENT
Start: 2017-06-14 | End: 2017-06-14

## 2017-06-14 RX ORDER — MEDROXYPROGESTERONE ACETATE 150 MG/ML
150 INJECTION, SUSPENSION INTRAMUSCULAR ONCE
Qty: 1 VIAL | Refills: 0 | Status: SHIPPED | OUTPATIENT
Start: 2017-06-14 | End: 2017-06-14 | Stop reason: SDUPTHER

## 2017-06-14 RX ORDER — MEDROXYPROGESTERONE ACETATE 150 MG/ML
150 INJECTION, SUSPENSION INTRAMUSCULAR ONCE
Qty: 1 VIAL | Refills: 0 | Status: SHIPPED
Start: 2017-06-14 | End: 2017-06-14

## 2017-06-14 RX ADMIN — MEDROXYPROGESTERONE ACETATE 150 MG: 150 INJECTION, SUSPENSION INTRAMUSCULAR at 10:10

## 2017-06-14 ASSESSMENT — PAIN SCALES - GENERAL: PAINLEVEL: NO PAIN

## 2017-06-14 NOTE — PROGRESS NOTES
Subjective:   Isa Mcclure is a 21 y.o. female here today for discuss birth control methods    Initiation of Depo Provera  Patient presents today for birth control counseling. She would like to start Depo-Provera Provera injections. She has been on several types of pills in the past and has difficulty remembering to take pills and would like something a little more permanent. We also discussed patches, vaginal ring, IUD, and implant. Her last menstrual period was in May. She reports very irregular cycles since she gave birth to her son 11 months ago. Pregnancy test in clinic today was negative. She is not currently sexually active. Her main goals with birth control or to even out and regulate her period as well as prevent pregnancy.         Current medicines (including changes today)  Current Outpatient Prescriptions   Medication Sig Dispense Refill   • medroxyPROGESTERone (DEPO-PROVERA) 150 MG/ML Suspension 1 mL by Intramuscular route Once for 1 dose. 1 Vial 0   • benzonatate (TESSALON) 100 MG Cap Take 1 Cap by mouth 3 times a day as needed for Cough. 30 Cap 0   • guaifenesin LA (MUCINEX) 600 MG TABLET SR 12 HR Take 1 Tab by mouth every 12 hours. 20 Tab 0   • hydrocortisone 2.5 % Cream topical cream Apply twice daily to rash on finger until resolved 1 Tube 1   • guaifenesin-codeine (TUSSI-ORGANIDIN NR) 100-10 MG/5ML syrup Take 5 mL by mouth 3 times a day as needed for Cough. 60 mL 0   • buPROPion SR (WELLBUTRIN-SR) 150 MG TABLET SR 12 HR sustained-release tablet Take 1 Tab by mouth 2 times a day. 60 Tab 6   • clonazepam (KLONOPIN) 0.5 MG Tab Take 1 Tab by mouth 2 times a day as needed. 30 Tab 0     Current Facility-Administered Medications   Medication Dose Route Frequency Provider Last Rate Last Dose   • medroxyPROGESTERone (DEPO-PROVERA) injection 150 mg  150 mg Intramuscular Once Theresa Goss M.D.         She  has a past medical history of Drug abuse, IV and Depression.    ROS   As above in HPI      "Objective:     Blood pressure 118/78, pulse 86, temperature 36.2 °C (97.2 °F), resp. rate 16, height 1.511 m (4' 11.49\"), weight 62.596 kg (138 lb), last menstrual period 05/10/2017, SpO2 97 %, not currently breastfeeding. Body mass index is 27.42 kg/(m^2).   Physical Exam:  Constitutional: Alert, no distress.  Skin: Warm, dry, good turgor, no rashes in visible areas.  Eye: Equal, round and reactive, conjunctiva clear, lids normal.  Psych: Alert and oriented x3, normal affect and mood.    Results and Imaging:   POC pregnancy test done in clinic today was negative    Assessment and Plan:   The following treatment plan was discussed    1. Initiation of Depo Provera  Risks and benefits of Low injection discussed with patient today.  Discussed possibility of weight gain and irregular bleeding as main side effects. Discussed window of 11-14 weeks for patient to return for her next injection. She does have a history of depression and I asked her to monitor her symptoms after receiving the injection as it can cause some mood changes. She is currently not taking her antidepressant. She is however following up with therapy and seeing them weekly.  - medroxyPROGESTERone (DEPO-PROVERA) injection 150 mg; 1 mL by Intramuscular route Once.  - POCT Pregnancy  - medroxyPROGESTERone (DEPO-PROVERA) 150 MG/ML Suspension; 1 mL by Intramuscular route Once for 1 dose.  Dispense: 1 Vial; Refill: 0    Followup: Return in about 3 months (around 9/14/2017), or if symptoms worsen or fail to improve, for repeat Depo shot.           "

## 2017-06-14 NOTE — ASSESSMENT & PLAN NOTE
Patient presents today for birth control counseling. She would like to start Depo-Provera Provera injections. She has been on several types of pills in the past and has difficulty remembering to take pills and would like something a little more permanent. We also discussed patches, vaginal ring, IUD, and implant. Her last menstrual period was in May. She reports very irregular cycles since she gave birth to her son 11 months ago. Pregnancy test in clinic today was negative. She is not currently sexually active. Her main goals with birth control or to even out and regulate her period as well as prevent pregnancy.

## 2017-06-14 NOTE — MR AVS SNAPSHOT
"        Isa Mcclure   2017 9:30 AM   Office Visit   MRN: [id number]    [facility]   Dept Phone:  [phone]    Description:  Female : 1995   Provider:  Theresa Goss M.D.           Reason for Visit     Contraception questions      Allergies as of 2017     No Known Allergies      You were diagnosed with     Initiation of Depo Provera   [011394]         Vital Signs     Blood Pressure Pulse Temperature Respirations Height Weight    118/78 mmHg 86 36.2 °C (97.2 °F) 16 1.511 m (4' 11.49\") 62.596 kg (138 lb)    Body Mass Index Oxygen Saturation Last Menstrual Period Breastfeeding? Smoking Status       27.42 kg/m2 97% 05/10/2017 No Former Smoker       Basic Information     Date Of Birth Sex Race Ethnicity Preferred Language    1995 Female  or   Origin (Grenadian,Moroccan,Estonian,Chandler, etc) English      Problem List              ICD-10-CM Priority Class Noted - Resolved    Methamphetamine abuse in remission F15.10   2014 - Present    Moderate episode of recurrent major depressive disorder (CMS-HCC) F33.1   2017 - Present    Anxiety F41.9   2017 - Present    PTSD (post-traumatic stress disorder) F43.10   2017 - Present    Viral URI with cough J06.9, B97.89   2017 - Present    Rash of hands R21   2017 - Present    Initiation of Depo Provera Z30.013   2017 - Present      Health Maintenance        Date Due Completion Dates    IMM HEP B VACCINE (1 of 3 - Primary Series) 1994 ---    IMM HEP A VACCINE (1 of 2 - Standard Series) 1995 ---    IMM HPV VACCINE (1 of 3 - Female 3 Dose Series) 2005 ---    IMM VARICELLA (CHICKENPOX) VACCINE (1 of 2 - 2 Dose Adolescent Series) 2007 ---    PAP SMEAR 2015 ---    IMM DTaP/Tdap/Td Vaccine (2 - Td) 3/15/2027 3/15/2017            Current Immunizations     Influenza Vaccine Quad Inj (Pf) 2017    Tdap Vaccine 3/15/2017      Below and/or attached are the " medications your provider expects you to take. Review all of your home medications and newly ordered medications with your provider and/or pharmacist. Follow medication instructions as directed by your provider and/or pharmacist. Please keep your medication list with you and share with your provider. Update the information when medications are discontinued, doses are changed, or new medications (including over-the-counter products) are added; and carry medication information at all times in the event of emergency situations     Allergies:  No Known Allergies          Medications  Valid as of: June 14, 2017 - 10:28 AM    Generic Name Brand Name Tablet Size Instructions for use    Benzonatate (Cap) TESSALON 100 MG Take 1 Cap by mouth 3 times a day as needed for Cough.        BuPROPion HCl (TABLET SR 12 HR) WELLBUTRIN- MG Take 1 Tab by mouth 2 times a day.        ClonazePAM (Tab) KLONOPIN 0.5 MG Take 1 Tab by mouth 2 times a day as needed.        GuaiFENesin (TABLET SR 12 HR) MUCINEX 600 MG Take 1 Tab by mouth every 12 hours.        Guaifenesin-Codeine (Syrup) TUSSI-ORGANIDIN -10 MG/5ML Take 5 mL by mouth 3 times a day as needed for Cough.        Hydrocortisone (Cream) hydrocortisone 2.5 % Apply twice daily to rash on finger until resolved        MedroxyPROGESTERone Acetate (Suspension) DEPO-PROVERA 150 MG/ML 1 mL by Intramuscular route Once for 1 dose.        .                 Medicines prescribed today were sent to:     [facility] [address] [place] [address] (S), [place] - [address] [id number] [facility] (S) [place] [address]    Phone: [phone] Fax: [phone]    Open 24 Hours?: No      Medication refill instructions:       If your prescription bottle indicates you have medication refills left, it is not necessary to call your provider’s office. Please contact your pharmacy and they will refill your medication.    If your prescription bottle indicates you do not have any refills left, you may request refills  at any time through one of the following ways: The online [employer] Direct ([employer]) system (except Urgent Care), by calling your provider’s office, or by asking your pharmacy to contact your provider’s office with a refill request. Medication refills are processed only during regular business hours and may not be available until the next business day. Your provider may request additional information or to have a follow-up visit with you prior to refilling your medication.   *Please Note: Medication refills are assigned a new Rx number when refilled electronically. Your pharmacy may indicate that no refills were authorized even though a new prescription for the same medication is available at the pharmacy. Please request the medicine by name with the pharmacy before contacting your provider for a refill.           [employer] Direct ([employer]) Access Code: Activation code not generated  Current [employer] ([employer]) Status: Active

## 2017-08-21 ENCOUNTER — OFFICE VISIT (OUTPATIENT)
Dept: MEDICAL GROUP | Facility: MEDICAL CENTER | Age: 22
End: 2017-08-21
Attending: NURSE PRACTITIONER
Payer: MEDICAID

## 2017-08-21 VITALS
DIASTOLIC BLOOD PRESSURE: 70 MMHG | RESPIRATION RATE: 16 BRPM | BODY MASS INDEX: 28.02 KG/M2 | HEIGHT: 59 IN | SYSTOLIC BLOOD PRESSURE: 102 MMHG | HEART RATE: 76 BPM | TEMPERATURE: 97.9 F | WEIGHT: 139 LBS | OXYGEN SATURATION: 100 %

## 2017-08-21 DIAGNOSIS — L02.02 BOIL, FACE: ICD-10-CM

## 2017-08-21 DIAGNOSIS — H57.11 ACUTE RIGHT EYE PAIN: ICD-10-CM

## 2017-08-21 PROCEDURE — 99214 OFFICE O/P EST MOD 30 MIN: CPT | Performed by: NURSE PRACTITIONER

## 2017-08-21 PROCEDURE — 99212 OFFICE O/P EST SF 10 MIN: CPT | Performed by: NURSE PRACTITIONER

## 2017-08-21 RX ORDER — MEDROXYPROGESTERONE ACETATE 150 MG/ML
INJECTION, SUSPENSION INTRAMUSCULAR
COMMUNITY
Start: 2017-06-14 | End: 2017-09-15 | Stop reason: SDUPTHER

## 2017-08-21 RX ORDER — SULFAMETHOXAZOLE AND TRIMETHOPRIM 800; 160 MG/1; MG/1
1 TABLET ORAL 2 TIMES DAILY
Qty: 20 TAB | Refills: 0 | Status: SHIPPED | OUTPATIENT
Start: 2017-08-21 | End: 2017-10-04

## 2017-08-21 RX ORDER — PREDNISONE 10 MG/1
TABLET ORAL
Qty: 12 TAB | Refills: 0 | Status: SHIPPED | OUTPATIENT
Start: 2017-08-21 | End: 2017-10-04

## 2017-08-21 ASSESSMENT — PAIN SCALES - GENERAL: PAINLEVEL: 7=MODERATE-SEVERE PAIN

## 2017-08-21 NOTE — PROGRESS NOTES
"    Chief Complaint: Right Eye brow swelling and pain.    HPI:  Isa presents to the clinic as same day appt for right eye problem/pain.  Her PCP, Dr Goss is not available to see her today.    Her PMH includes:    Anxiety  Depo-Provera Injections  Meth Abuse reportedly in Remission (IV)  Major Depressive Disorder  PTSD  Rash to hands  Viral URI w cough  Cholecystectomy  Former Smoker    Review of REcords show:  6/24/17 Last Clinic Visit (Dr Goss). For initiation of Depo Provera injections for Birth Control.     [place]  Report shows:  3/25/17 Norco 5/325 # 15  1/13/17 Clonazepam 0.5 mg # 30 by Dr Goss  ------------------------------------------------------------------      Acute right Eye Brow pain  Isa reports awoke a couple days ago with right eye swelling.   \"bump on eyebrow like a pimple\"  In past had staph boil. Thought was allergies  And tried allergy med , I think it was Claritin and \"helped a little\"  Rates pain moderate. Denies pain in eye itself or itching to eyelids  Nor drainage.  Denies fever or chills.   We discussed the apparent boil is in the area she shaves around her lateral right eyebrow   And she should consider waxing or other method.    She would like cream to apply when she shaves and we discussed trial of Bactroban.      Patient Active Problem List    Diagnosis Date Noted   • Acute right eye pain 08/21/2017   • Initiation of Depo Provera 06/14/2017   • Viral URI with cough 05/16/2017   • Rash of hands 05/16/2017   • Moderate episode of recurrent major depressive disorder (CMS-HCC) 01/13/2017   • Anxiety 01/13/2017   • PTSD (post-traumatic stress disorder) 01/13/2017   • Methamphetamine abuse in remission 01/25/2014       Allergies:Review of patient's allergies indicates no known allergies.    Current Outpatient Prescriptions   Medication Sig Dispense Refill   • sulfamethoxazole-trimethoprim (BACTRIM DS) 800-160 MG tablet Take 1 Tab by mouth 2 times a day. 20 Tab 0   • " "predniSONE (DELTASONE) 10 MG Tab 10 mg twice a day for 5 days, then 10 mg once a day for 2 days 12 Tab 0   • mupirocin (BACTROBAN) 2 % Ointment Apply small amt to skin areas before and after shaving. 1 Tube 1   • MedroxyPROGESTERone Acetate 150 MG/ML Suspension Prefilled Syringe      • benzonatate (TESSALON) 100 MG Cap Take 1 Cap by mouth 3 times a day as needed for Cough. 30 Cap 0   • guaifenesin LA (MUCINEX) 600 MG TABLET SR 12 HR Take 1 Tab by mouth every 12 hours. 20 Tab 0   • hydrocortisone 2.5 % Cream topical cream Apply twice daily to rash on finger until resolved 1 Tube 1   • guaifenesin-codeine (TUSSI-ORGANIDIN NR) 100-10 MG/5ML syrup Take 5 mL by mouth 3 times a day as needed for Cough. 60 mL 0   • buPROPion SR (WELLBUTRIN-SR) 150 MG TABLET SR 12 HR sustained-release tablet Take 1 Tab by mouth 2 times a day. 60 Tab 6   • clonazepam (KLONOPIN) 0.5 MG Tab Take 1 Tab by mouth 2 times a day as needed. 30 Tab 0     No current facility-administered medications for this visit.       Social History   Substance Use Topics   • Smoking status: Former Smoker -- 0.50 packs/day for 6 years     Types: Cigarettes     Quit date: 08/13/2015   • Smokeless tobacco: Never Used   • Alcohol Use: None       Family History   Problem Relation Age of Onset   • Hypertension Mother    • Diabetes Mother    • Psychiatry Mother    • Hypertension Father    • Psychiatry Brother        ROS:  Review of Systems   See HPI Above        Exam:  Blood pressure 102/70, pulse 76, temperature 36.6 °C (97.9 °F), resp. rate 16, height 1.511 m (4' 11.49\"), weight 63.05 kg (139 lb), SpO2 100 %.  General:  Well nourished, well developed female in NAD  HENT:Head is grossly normal. PERRL.  Neck: Supple. Trachea is midline.  Pulmonary: Clear to ausculation .  Normal effort. No rales, ronchi, or wheezing.   Cardiovascular: Regular rate and rhythm.  Abdomen-Abdomen is soft, No tenderness.  Upper extremities- Strong = . Good ROM  Lower extremities- neg for " edema, redness, tenderness.  Neuro- A & O x 4. Speech clear and appropriate.     Current medications, allergies, and problem list reviewed with patient and updated in  [facility] today.    Assessment/Plan:  1. Acute right eye pain  Pt to monitor as eye itself is not painful.  If more than eyebrow area becomes painful, and eye involved, pt to go to ER for evaluation   2. Boil, face  sulfamethoxazole-trimethoprim (BACTRIM DS) 800-160 MG tablet  Warm compresses to right lateral eyebrow area.    predniSONE (DELTASONE) 10 MG Tab BID x 5 days then once daily x 2 days.  Continue Claritin otc once daily x 1 week    mupirocin (BACTROBAN) 2 % Ointment before and after shaving eyebrows or axilla areas.   Recommended waxing or other method besides shaving to patient.   Follow up as needed with PCP, Dr Goss.  Call or return if questions, concerns, or worsening condition.

## 2017-08-21 NOTE — Clinical Note
August 21, 2017       Patient: Isa Mcclure   YOB: 1995   Date of Visit: 8/21/2017         To Whom It May Concern:    Isa Mcclure was seen in clinic for medical reasons.  Please excuse from missing work on 8/19 and 8/20/17 and may return to work on 8/23/17 without restrictions.    If you have any questions or concerns, please don't hesitate to call [phone]          Sincerely,          CORNELIO Valenzuela.  Electronically Signed

## 2017-08-21 NOTE — MR AVS SNAPSHOT
"        Isa Mcclure   2017 2:30 PM   Office Visit   MRN: [id number]    [facility]   Dept Phone:  [phone]    Description:  Female : 1995   Provider:  RAFAEL Valenzuela           Reason for Visit     Eye Problem right eye       Allergies as of 2017     No Known Allergies      You were diagnosed with     Acute right eye pain   [202203]       Boil, face   [[id number]]         Vital Signs     Blood Pressure Pulse Temperature Respirations Height Weight    102/70 mmHg 76 36.6 °C (97.9 °F) 16 1.511 m (4' 11.49\") 63.05 kg (139 lb)    Body Mass Index Oxygen Saturation Smoking Status             27.62 kg/m2 100% Former Smoker         Basic Information     Date Of Birth Sex Race Ethnicity Preferred Language    1995 Female  or   Origin (New Zealander,Tunisian,Equatorial Guinean,Chandler, etc) English      Problem List              ICD-10-CM Priority Class Noted - Resolved    Methamphetamine abuse in remission F15.10   2014 - Present    Moderate episode of recurrent major depressive disorder (CMS-HCC) F33.1   2017 - Present    Anxiety F41.9   2017 - Present    PTSD (post-traumatic stress disorder) F43.10   2017 - Present    Viral URI with cough J06.9, B97.89   2017 - Present    Rash of hands R21   2017 - Present    Initiation of Depo Provera Z30.013   2017 - Present    Acute right eye pain H57.11   2017 - Present      Health Maintenance        Date Due Completion Dates    IMM HEP B VACCINE (1 of 3 - Primary Series) 1994 ---    IMM HEP A VACCINE (1 of 2 - Standard Series) 1995 ---    IMM HPV VACCINE (1 of 3 - Female 3 Dose Series) 2005 ---    IMM VARICELLA (CHICKENPOX) VACCINE (1 of 2 - 2 Dose Adolescent Series) 2007 ---    PAP SMEAR 2015 ---    IMM INFLUENZA (1) 2017    IMM DTaP/Tdap/Td Vaccine (2 - Td) 3/15/2027 3/15/2017            Current Immunizations     Influenza Vaccine Quad Inj (Pf) " 1/13/2017    Tdap Vaccine 3/15/2017      Below and/or attached are the medications your provider expects you to take. Review all of your home medications and newly ordered medications with your provider and/or pharmacist. Follow medication instructions as directed by your provider and/or pharmacist. Please keep your medication list with you and share with your provider. Update the information when medications are discontinued, doses are changed, or new medications (including over-the-counter products) are added; and carry medication information at all times in the event of emergency situations     Allergies:  No Known Allergies          Medications  Valid as of: August 21, 2017 -  2:40 PM    Generic Name Brand Name Tablet Size Instructions for use    Benzonatate (Cap) TESSALON 100 MG Take 1 Cap by mouth 3 times a day as needed for Cough.        BuPROPion HCl (TABLET SR 12 HR) WELLBUTRIN- MG Take 1 Tab by mouth 2 times a day.        ClonazePAM (Tab) KLONOPIN 0.5 MG Take 1 Tab by mouth 2 times a day as needed.        GuaiFENesin (TABLET SR 12 HR) MUCINEX 600 MG Take 1 Tab by mouth every 12 hours.        Guaifenesin-Codeine (Syrup) TUSSI-ORGANIDIN -10 MG/5ML Take 5 mL by mouth 3 times a day as needed for Cough.        Hydrocortisone (Cream) hydrocortisone 2.5 % Apply twice daily to rash on finger until resolved        MedroxyPROGESTERone Acetate (Suspension Prefilled Syringe) MedroxyPROGESTERone Acetate 150 MG/ML         Mupirocin (Ointment) BACTROBAN 2 % Apply small amt to skin areas before and after shaving.        PredniSONE (Tab) DELTASONE 10 MG 10 mg twice a day for 5 days, then 10 mg once a day for 2 days        Sulfamethoxazole-Trimethoprim (Tab) BACTRIM -160 MG Take 1 Tab by mouth 2 times a day.        .                 Medicines prescribed today were sent to:     [facility]  [place] ([place]), [place] - [address] [place] ([place]) [place] [address]    Phone: [phone] Fax:  [phone]    Open 24 Hours?: No      Medication refill instructions:       If your prescription bottle indicates you have medication refills left, it is not necessary to call your provider’s office. Please contact your pharmacy and they will refill your medication.    If your prescription bottle indicates you do not have any refills left, you may request refills at any time through one of the following ways: The online [employer] system (except Urgent Care), by calling your provider’s office, or by asking your pharmacy to contact your provider’s office with a refill request. Medication refills are processed only during regular business hours and may not be available until the next business day. Your provider may request additional information or to have a follow-up visit with you prior to refilling your medication.   *Please Note: Medication refills are assigned a new Rx number when refilled electronically. Your pharmacy may indicate that no refills were authorized even though a new prescription for the same medication is available at the pharmacy. Please request the medicine by name with the pharmacy before contacting your provider for a refill.           [employer] Access Code: Activation code not generated  Current [employer] Status: Active

## 2017-08-21 NOTE — ASSESSMENT & PLAN NOTE
"Isa reports awoke a couple days ago with right eye swelling.   \"bump on eyebrow like a pimple\"  In past had staph boil. Thought was allergies  And tried allergy med , I think it was Claritin and \"helped a little\"  Rates pain moderate. Denies pain in eye itself or itching to eyelids  Nor drainage.  Denies fever or chills.   We discussed the apparent boil is in the area she shaves around her lateral right eyebrow   And she should consider waxing or other method.    She would like cream to apply when she shaves and we discussed trial of Bactroban.  "

## 2017-09-15 ENCOUNTER — NON-PROVIDER VISIT (OUTPATIENT)
Dept: MEDICAL GROUP | Facility: MEDICAL CENTER | Age: 22
End: 2017-09-15
Attending: FAMILY MEDICINE
Payer: MEDICAID

## 2017-09-15 PROCEDURE — 96372 THER/PROPH/DIAG INJ SC/IM: CPT | Performed by: FAMILY MEDICINE

## 2017-09-15 RX ORDER — MEDROXYPROGESTERONE ACETATE 150 MG/ML
1 INJECTION, SUSPENSION INTRAMUSCULAR ONCE
Qty: 1 SYRINGE | Refills: 0 | Status: SHIPPED | OUTPATIENT
Start: 2017-09-15 | End: 2017-09-15 | Stop reason: SDUPTHER

## 2017-09-15 RX ORDER — MEDROXYPROGESTERONE ACETATE 150 MG/ML
1 INJECTION, SUSPENSION INTRAMUSCULAR ONCE
Qty: 1 SYRINGE | Refills: 0 | Status: SHIPPED | OUTPATIENT
Start: 2017-09-15 | End: 2017-09-15

## 2017-10-04 ENCOUNTER — OFFICE VISIT (OUTPATIENT)
Dept: MEDICAL GROUP | Facility: MEDICAL CENTER | Age: 22
End: 2017-10-04
Attending: INTERNAL MEDICINE
Payer: MEDICAID

## 2017-10-04 VITALS
TEMPERATURE: 97.6 F | SYSTOLIC BLOOD PRESSURE: 120 MMHG | HEIGHT: 59 IN | RESPIRATION RATE: 16 BRPM | BODY MASS INDEX: 28.22 KG/M2 | WEIGHT: 140 LBS | DIASTOLIC BLOOD PRESSURE: 84 MMHG | OXYGEN SATURATION: 99 % | HEART RATE: 100 BPM

## 2017-10-04 DIAGNOSIS — Z23 NEED FOR VACCINATION: ICD-10-CM

## 2017-10-04 DIAGNOSIS — F41.9 ANXIETY: ICD-10-CM

## 2017-10-04 DIAGNOSIS — R21 RASH OF HANDS: ICD-10-CM

## 2017-10-04 DIAGNOSIS — F33.1 MODERATE EPISODE OF RECURRENT MAJOR DEPRESSIVE DISORDER (HCC): ICD-10-CM

## 2017-10-04 DIAGNOSIS — Z30.011 OCP (ORAL CONTRACEPTIVE PILLS) INITIATION: ICD-10-CM

## 2017-10-04 PROBLEM — H57.11 ACUTE RIGHT EYE PAIN: Status: RESOLVED | Noted: 2017-08-21 | Resolved: 2017-10-04

## 2017-10-04 PROBLEM — J06.9 VIRAL URI WITH COUGH: Status: RESOLVED | Noted: 2017-05-16 | Resolved: 2017-10-04

## 2017-10-04 PROBLEM — Z30.013 INITIATION OF DEPO PROVERA: Status: RESOLVED | Noted: 2017-06-14 | Resolved: 2017-10-04

## 2017-10-04 PROCEDURE — 90471 IMMUNIZATION ADMIN: CPT | Performed by: INTERNAL MEDICINE

## 2017-10-04 PROCEDURE — 99214 OFFICE O/P EST MOD 30 MIN: CPT | Mod: 25 | Performed by: INTERNAL MEDICINE

## 2017-10-04 PROCEDURE — 90686 IIV4 VACC NO PRSV 0.5 ML IM: CPT | Performed by: INTERNAL MEDICINE

## 2017-10-04 PROCEDURE — 99213 OFFICE O/P EST LOW 20 MIN: CPT | Performed by: INTERNAL MEDICINE

## 2017-10-04 RX ORDER — CLOBETASOL PROPIONATE 0.5 MG/G
OINTMENT TOPICAL
Qty: 30 G | Refills: 0 | Status: SHIPPED | OUTPATIENT
Start: 2017-10-04 | End: 2018-08-15

## 2017-10-04 RX ORDER — NORETHINDRONE ACETATE AND ETHINYL ESTRADIOL 1.5-30(21)
1 KIT ORAL DAILY
Qty: 28 TAB | Refills: 3 | Status: SHIPPED | OUTPATIENT
Start: 2017-10-04 | End: 2018-03-27

## 2017-10-04 RX ORDER — BUPROPION HYDROCHLORIDE 200 MG/1
200 TABLET, EXTENDED RELEASE ORAL 2 TIMES DAILY
Qty: 60 TAB | Refills: 3 | Status: SHIPPED | OUTPATIENT
Start: 2017-10-04 | End: 2017-11-07

## 2017-10-04 RX ORDER — BUSPIRONE HYDROCHLORIDE 10 MG/1
10 TABLET ORAL 2 TIMES DAILY
Qty: 60 TAB | Refills: 1 | Status: SHIPPED | OUTPATIENT
Start: 2017-10-04 | End: 2017-11-07

## 2017-10-04 ASSESSMENT — PATIENT HEALTH QUESTIONNAIRE - PHQ9
CLINICAL INTERPRETATION OF PHQ2 SCORE: 6
5. POOR APPETITE OR OVEREATING: 3 - NEARLY EVERY DAY
SUM OF ALL RESPONSES TO PHQ QUESTIONS 1-9: 24

## 2017-10-04 ASSESSMENT — PAIN SCALES - GENERAL: PAINLEVEL: 3=SLIGHT PAIN

## 2017-10-04 NOTE — ASSESSMENT & PLAN NOTE
Patient reports that her depression has worsened since she received her first Depo-Provera shot. It seems that the Wellbutrin has become less effective over time for her. She continues to see her therapist once a week which helps. PH Q9 done in clinic today was 24. She feels like she is not able to function well, having difficulty doing small things around the house and taking care of her kids, feels depressed all the time. She denies suicidal ideation. Previous medications she has tried have included Zoloft, Lexapro, and citalopram, and she had side effects including nausea and insomnia well on the Zoloft. The Lexapro and citalopram were not helpful.

## 2017-10-04 NOTE — ASSESSMENT & PLAN NOTE
Rash on patient's hand has worsened. It involves the right hand the lateral aspect of the fourth and fifth fingers as well as the right palm. There is deep fissures in the skin, peeling, raw exposed surfaces. It is itchy as well as painful. She tried the topical hydrocortisone cream which did not help much.

## 2017-10-04 NOTE — PROGRESS NOTES
"Subjective:   Isa Mcclure is a 22 y.o. female here today for Worsening anxiety and depression, worsening hand rash, would like to stop Depo-Provera    Moderate episode of recurrent major depressive disorder (CMS-HCC)  Patient reports that her depression has worsened since she received her first Depo-Provera shot. It seems that the Wellbutrin has become less effective over time for her. She continues to see her therapist once a week which helps. PH Q9 done in clinic today was 24. She feels like she is not able to function well, having difficulty doing small things around the house and taking care of her kids, feels depressed all the time. She denies suicidal ideation. Previous medications she has tried have included Zoloft, Lexapro, and citalopram, and she had side effects including nausea and insomnia well on the Zoloft. The Lexapro and citalopram were not helpful.    Anxiety  Reports worsening anxiety as her depression has gotten worse. She has used the clonazepam several times but even when she takes half a tablet of the 0.5 mg, she still feels tired and \"knocked out\" for the rest of the day. She would like to try an alternative medication for anxiety.    OCP (oral contraceptive pills) initiation  Patient states that she would like to stop using the Depo-Provera because of the worsening of her depression after her first injection and subsequent injection. She feels that she is now in the habit of taking her antidepressant daily and she would do well taking an oral contraceptive pill. She had her most recent Depo-Provera early September. She has not had a menstrual cycle since starting on the Depo-Provera.    Rash of hands  Rash on patient's hand has worsened. It involves the right hand the lateral aspect of the fourth and fifth fingers as well as the right palm. There is deep fissures in the skin, peeling, raw exposed surfaces. It is itchy as well as painful. She tried the topical hydrocortisone cream which " "did not help much.       Current medicines (including changes today)  Current Outpatient Prescriptions   Medication Sig Dispense Refill   • norethindrone-ethinyl estradiol-iron (MICROGESTIN FE1.5/30) 1.5-30 MG-MCG tablet Take 1 Tab by mouth every day. 28 Tab 3   • buPROPion (WELLBUTRIN SR) 200 MG SR tablet Take 1 Tab by mouth 2 times a day. 60 Tab 3   • busPIRone (BUSPAR) 10 MG Tab Take 1 Tab by mouth 2 times a day. 60 Tab 1   • clobetasol (TEMOVATE) 0.05 % Ointment Apply pea sized amount to rash on hand twice daily as needed. 30 g 0     No current facility-administered medications for this visit.      She  has a past medical history of Depression and Drug abuse, IV.    ROS   As above in HPI     Objective:     Blood pressure 120/84, pulse 100, temperature 36.4 °C (97.6 °F), resp. rate 16, height 1.511 m (4' 11.49\"), weight 63.5 kg (140 lb), SpO2 99 %, not currently breastfeeding. Body mass index is 27.81 kg/m².   Physical Exam:  Constitutional: Alert, no distress.  Skin: Warm, dry, good turgor, Scaling fissured skin over lateral aspect of fourth and fifth finger on right hand as well as right palm consistent with dyshidrotic eczema.  Eye: Equal, round and reactive, conjunctiva clear, lids normal.  Psych: Alert and oriented x3, flat affect, depressed mood.      Assessment and Plan:   The following treatment plan was discussed    1. Moderate episode of recurrent major depressive disorder (CMS-HCC)  Uncontrolled after patient started on Depo-Provera. I recommended we stop this although she just had an injection in September. Given that she had success with Wellbutrin prior to the Depo-Provera, we plan to increase her dose. She will follow up in 4 weeks. She will continue therapy.  -Increase Wellbutrin to 200 mg twice daily  -Continue therapy  - Patient has been identified as being depressed and appropriate orders and counseling have been given  -Follow up in 4 weeks    2. Need for vaccination  - Flu Quad Inj >3 Year " Pre-Filled PF    3. Anxiety  Uncontrolled. Patient would like to add a daily medication for anxiety. We discussed stopping the clonazepam. We will start BuSpar  -Start BuSpar 10 mg twice a day  -Discontinue clonazepam  -Follow up in 4 weeks    4. OCP (oral contraceptive pills) initiation  Discussed with patient that in December, once the Depo-Provera wears off, she can start taking the oral contraceptive pills. Prescription given today.  -Start OCPs beginning of December    5. Rash of hands  Looks like dyshidrotic eczema. We will treat with a more potent topical corticosteroid. Advised patient to let me know if no improvement in symptoms over the next week with the use of the clobetasol.  -Topical clobetasol ointment twice a day ×1 week, patient to report if no improvement in symptoms      Followup: Return in about 4 weeks (around 11/1/2017) for depression, anxiety.

## 2017-10-04 NOTE — ASSESSMENT & PLAN NOTE
"Reports worsening anxiety as her depression has gotten worse. She has used the clonazepam several times but even when she takes half a tablet of the 0.5 mg, she still feels tired and \"knocked out\" for the rest of the day. She would like to try an alternative medication for anxiety.  "

## 2017-10-04 NOTE — ASSESSMENT & PLAN NOTE
Patient states that she would like to stop using the Depo-Provera because of the worsening of her depression after her first injection and subsequent injection. She feels that she is now in the habit of taking her antidepressant daily and she would do well taking an oral contraceptive pill. She had her most recent Depo-Provera early September. She has not had a menstrual cycle since starting on the Depo-Provera.

## 2017-11-07 ENCOUNTER — OFFICE VISIT (OUTPATIENT)
Dept: MEDICAL GROUP | Facility: MEDICAL CENTER | Age: 22
End: 2017-11-07
Attending: INTERNAL MEDICINE
Payer: MEDICAID

## 2017-11-07 VITALS
SYSTOLIC BLOOD PRESSURE: 122 MMHG | TEMPERATURE: 98 F | DIASTOLIC BLOOD PRESSURE: 80 MMHG | HEART RATE: 100 BPM | HEIGHT: 59 IN | WEIGHT: 146 LBS | RESPIRATION RATE: 16 BRPM | OXYGEN SATURATION: 97 % | BODY MASS INDEX: 29.43 KG/M2

## 2017-11-07 DIAGNOSIS — R21 RASH OF HANDS: ICD-10-CM

## 2017-11-07 DIAGNOSIS — F33.1 MODERATE EPISODE OF RECURRENT MAJOR DEPRESSIVE DISORDER (HCC): ICD-10-CM

## 2017-11-07 DIAGNOSIS — F17.201 TOBACCO USE DISORDER, MILD, IN SUSTAINED REMISSION: ICD-10-CM

## 2017-11-07 DIAGNOSIS — F41.9 ANXIETY: ICD-10-CM

## 2017-11-07 PROBLEM — Z30.011 OCP (ORAL CONTRACEPTIVE PILLS) INITIATION: Status: RESOLVED | Noted: 2017-06-14 | Resolved: 2017-11-07

## 2017-11-07 PROCEDURE — 99213 OFFICE O/P EST LOW 20 MIN: CPT | Performed by: INTERNAL MEDICINE

## 2017-11-07 PROCEDURE — 99214 OFFICE O/P EST MOD 30 MIN: CPT | Performed by: INTERNAL MEDICINE

## 2017-11-07 RX ORDER — BUPROPION HYDROCHLORIDE 150 MG/1
150 TABLET, EXTENDED RELEASE ORAL 2 TIMES DAILY
Qty: 60 TAB | Refills: 3 | Status: SHIPPED | OUTPATIENT
Start: 2017-11-07 | End: 2018-01-18 | Stop reason: SDUPTHER

## 2017-11-07 RX ORDER — ESCITALOPRAM OXALATE 10 MG/1
TABLET ORAL
Qty: 30 TAB | Refills: 1 | Status: SHIPPED | OUTPATIENT
Start: 2017-11-07 | End: 2018-01-18 | Stop reason: SDUPTHER

## 2017-11-07 ASSESSMENT — PATIENT HEALTH QUESTIONNAIRE - PHQ9
CLINICAL INTERPRETATION OF PHQ2 SCORE: 6
5. POOR APPETITE OR OVEREATING: 3 - NEARLY EVERY DAY
SUM OF ALL RESPONSES TO PHQ QUESTIONS 1-9: 22

## 2017-11-07 ASSESSMENT — PAIN SCALES - GENERAL: PAINLEVEL: NO PAIN

## 2017-11-08 NOTE — ASSESSMENT & PLAN NOTE
Reports resolution of the eczema on her hands after using the clobetasol cream once. Still has plenty on hand in case she needs to use it again.

## 2017-11-08 NOTE — PROGRESS NOTES
Subjective:   Isa Mcclure is a 22 y.o. female here today for Follow-up depression    Tobacco use disorder, mild, in sustained remission  Patient has been able to stop smoking and feels that Wellbutrin has been helpful for her with this. Although it has not been working for her depression, she would like to stay on it for smoking prevention.    Rash of hands  Reports resolution of the eczema on her hands after using the clobetasol cream once. Still has plenty on hand in case she needs to use it again.    Moderate episode of recurrent major depressive disorder (CMS-HCC)  Presents for follow-up after last month we increased her Wellbutrin from 150 mg twice a day to 200 mg twice a day. She reports very little change in symptoms although her pH Q9 score did decrease by 3 points. At this point, she would like to try a different medication. We have had her on the Zoloft in the past but she reported difficulty with eating, nausea, and insomnia when she was on it.     Anxiety  Patient was started on BuSpar at her last visit but she states that she felt nauseous when taking this medication and she stopped after about a week.       Current medicines (including changes today)  Current Outpatient Prescriptions   Medication Sig Dispense Refill   • escitalopram (LEXAPRO) 10 MG Tab Take 1/2 tablet daily for one week then increase to one full tablet 30 Tab 1   • buPROPion SR (WELLBUTRIN-SR) 150 MG TABLET SR 12 HR sustained-release tablet Take 1 Tab by mouth 2 times a day. 60 Tab 3   • clobetasol (TEMOVATE) 0.05 % Ointment Apply pea sized amount to rash on hand twice daily as needed. 30 g 0   • norethindrone-ethinyl estradiol-iron (MICROGESTIN FE1.5/30) 1.5-30 MG-MCG tablet Take 1 Tab by mouth every day. 28 Tab 3     No current facility-administered medications for this visit.      She  has a past medical history of Anxiety; Depression; and Drug abuse, IV.    ROS   As above in HPI     Objective:     Blood pressure 122/80, pulse  "100, temperature 36.7 °C (98 °F), resp. rate 16, height 1.511 m (4' 11.49\"), weight 66.2 kg (146 lb), SpO2 97 %, not currently breastfeeding. Body mass index is 29.01 kg/m².   Physical Exam:  Constitutional: Alert, no distress.  Skin: Warm, dry, good turgor, no rashes in visible areas.  Eye: Equal, round and reactive, conjunctiva clear, lids normal.  Psych: Alert and oriented x3, depressed mood, denies suicidal ideations.      Assessment and Plan:   The following treatment plan was discussed    1. Moderate episode of recurrent major depressive disorder (CMS-HCC)  Uncontrolled with max dose of well-being returned. We will switch patient to Lexapro but she would like to stay on the Wellbutrin as it has helped with smoking cessation. We will decrease her to the 150 mg twice daily dose.  -Start Lexapro 10 mg one half tablet daily for one week then increase to one full tablet daily  -Follow up 4 weeks  - Patient has been identified as being depressed and appropriate orders and counseling have been given    2. Tobacco use disorder, mild, in sustained remission  Stable, patient to continue Wellbutrin 150 mg twice a day    3. Rash of hands  Improved with clobetasol. Patient will use as needed.    Followup: Return in about 4 weeks (around 12/5/2017), or if symptoms worsen or fail to improve, for depression.         "

## 2017-11-08 NOTE — ASSESSMENT & PLAN NOTE
Presents for follow-up after last month we increased her Wellbutrin from 150 mg twice a day to 200 mg twice a day. She reports very little change in symptoms although her pH Q9 score did decrease by 3 points. At this point, she would like to try a different medication. We have had her on the Zoloft in the past but she reported difficulty with eating, nausea, and insomnia when she was on it.

## 2017-11-08 NOTE — ASSESSMENT & PLAN NOTE
Patient was started on BuSpar at her last visit but she states that she felt nauseous when taking this medication and she stopped after about a week.

## 2017-11-08 NOTE — ASSESSMENT & PLAN NOTE
Patient has been able to stop smoking and feels that Wellbutrin has been helpful for her with this. Although it has not been working for her depression, she would like to stay on it for smoking prevention.

## 2018-01-18 ENCOUNTER — OFFICE VISIT (OUTPATIENT)
Dept: MEDICAL GROUP | Facility: MEDICAL CENTER | Age: 23
End: 2018-01-18
Attending: INTERNAL MEDICINE
Payer: MEDICAID

## 2018-01-18 VITALS
OXYGEN SATURATION: 99 % | BODY MASS INDEX: 29.64 KG/M2 | HEART RATE: 102 BPM | SYSTOLIC BLOOD PRESSURE: 118 MMHG | TEMPERATURE: 98.9 F | HEIGHT: 59 IN | WEIGHT: 147 LBS | RESPIRATION RATE: 16 BRPM | DIASTOLIC BLOOD PRESSURE: 78 MMHG

## 2018-01-18 DIAGNOSIS — R68.89 FLU-LIKE SYMPTOMS: ICD-10-CM

## 2018-01-18 LAB
FLUAV+FLUBV AG SPEC QL IA: NEGATIVE
INT CON NEG: NEGATIVE
INT CON POS: POSITIVE

## 2018-01-18 PROCEDURE — 99214 OFFICE O/P EST MOD 30 MIN: CPT | Performed by: INTERNAL MEDICINE

## 2018-01-18 PROCEDURE — 99213 OFFICE O/P EST LOW 20 MIN: CPT | Performed by: INTERNAL MEDICINE

## 2018-01-18 RX ORDER — BUPROPION HYDROCHLORIDE 150 MG/1
150 TABLET, EXTENDED RELEASE ORAL 2 TIMES DAILY
Qty: 60 TAB | Refills: 5 | Status: SHIPPED | OUTPATIENT
Start: 2018-01-18 | End: 2018-03-27

## 2018-01-18 RX ORDER — ESCITALOPRAM OXALATE 10 MG/1
10 TABLET ORAL DAILY
Qty: 30 TAB | Refills: 5 | Status: SHIPPED | OUTPATIENT
Start: 2018-01-18 | End: 2018-03-27

## 2018-01-18 RX ORDER — ONDANSETRON 4 MG/1
4 TABLET, ORALLY DISINTEGRATING ORAL EVERY 6 HOURS PRN
Qty: 10 TAB | Refills: 0 | Status: SHIPPED | OUTPATIENT
Start: 2018-01-18 | End: 2018-06-26

## 2018-01-18 RX ORDER — ONDANSETRON 4 MG/1
4 TABLET, ORALLY DISINTEGRATING ORAL EVERY 6 HOURS PRN
Qty: 10 TAB | Refills: 0 | Status: SHIPPED | OUTPATIENT
Start: 2018-01-18 | End: 2018-01-18 | Stop reason: SDUPTHER

## 2018-01-18 ASSESSMENT — PAIN SCALES - GENERAL: PAINLEVEL: 3=SLIGHT PAIN

## 2018-01-18 NOTE — ASSESSMENT & PLAN NOTE
Patient reports that starting yesterday, she developed acute onset of diarrhea and had about 4 bowel movements very quickly. Following that she had nausea with vomiting and she hasn't been able to keep down food or water since last night. She also reports that she has not urinated since last night. She is complaining of fevers and chills although she hasn't taken her temperature at home. She's had a very mild cough and sore throat as well as some nasal congestion. She did get her flu shot this year. She denies any sick contacts.

## 2018-01-18 NOTE — PROGRESS NOTES
"Subjective:   Isa Mcclure is a 22 y.o. female here today for diarrhea, nausea/vomiting, inability to tolerate liquids, cough    Flu-like symptoms  Patient reports that starting yesterday, she developed acute onset of diarrhea and had about 4 bowel movements very quickly. Following that she had nausea with vomiting and she hasn't been able to keep down food or water since last night. She also reports that she has not urinated since last night. She is complaining of fevers and chills although she hasn't taken her temperature at home. She's had a very mild cough and sore throat as well as some nasal congestion. She did get her flu shot this year. She denies any sick contacts.        Current medicines (including changes today)  Current Outpatient Prescriptions   Medication Sig Dispense Refill   • ondansetron (ZOFRAN ODT) 4 MG TABLET DISPERSIBLE Take 1 Tab by mouth every 6 hours as needed for Nausea. 10 Tab 0   • escitalopram (LEXAPRO) 10 MG Tab Take 1 Tab by mouth every day. 30 Tab 5   • buPROPion SR (WELLBUTRIN-SR) 150 MG TABLET SR 12 HR sustained-release tablet Take 1 Tab by mouth 2 times a day. 60 Tab 5   • norethindrone-ethinyl estradiol-iron (MICROGESTIN FE1.5/30) 1.5-30 MG-MCG tablet Take 1 Tab by mouth every day. 28 Tab 3   • clobetasol (TEMOVATE) 0.05 % Ointment Apply pea sized amount to rash on hand twice daily as needed. 30 g 0     No current facility-administered medications for this visit.      She  has a past medical history of Anxiety; Depression; and Drug abuse, IV.    ROS   As above in HPI     Objective:     Blood pressure 118/78, pulse (!) 102, temperature 37.2 °C (98.9 °F), resp. rate 16, height 1.511 m (4' 11.49\"), weight 66.7 kg (147 lb), SpO2 99 %, not currently breastfeeding. Body mass index is 29.21 kg/m².   Physical Exam:  Constitutional: Alert, no distress.  Skin: Warm, dry, good turgor, no rashes in visible areas.  ENMT: Lips without lesions, good dentition, oropharynx clear  Neck: Trachea " midline, no masses, no thyromegaly. No cervical or supraclavicular lymphadenopathy  Respiratory: Unlabored respiratory effort, lungs clear to auscultation, no wheezes, no ronchi.  Cardiovascular: Normal S1, S2, no murmur  Abdomen: Soft, mild tenderness to palpation over epigastric region without rebound or guarding, no masses, no hepatosplenomegaly.      POC Influenza A/B done in clinic today was negative    Assessment and Plan:   The following treatment plan was discussed    1. Flu-like symptoms  Negative influenza testing. Most likely viral syndrome. I have given her some Zofran to take and encouraged her to drink plenty of fluids. We discussed that with the Zofran if she is still unable to tolerate liquids and has not been able to urinate, she needs to go to the emergency room later this evening. She expressed understanding.  - ondansetron (ZOFRAN ODT) 4 MG TABLET DISPERSIBLE; Take 1 Tab by mouth every 6 hours as needed for Nausea.  Dispense: 10 Tab; Refill: 0  - POCT Influenza A/B        Followup: Return if symptoms worsen or fail to improve, for PAP.

## 2018-03-27 ENCOUNTER — HOSPITAL ENCOUNTER (OUTPATIENT)
Facility: MEDICAL CENTER | Age: 23
End: 2018-03-27
Attending: INTERNAL MEDICINE
Payer: MEDICAID

## 2018-03-27 ENCOUNTER — OFFICE VISIT (OUTPATIENT)
Dept: MEDICAL GROUP | Facility: MEDICAL CENTER | Age: 23
End: 2018-03-27
Attending: INTERNAL MEDICINE
Payer: MEDICAID

## 2018-03-27 VITALS
SYSTOLIC BLOOD PRESSURE: 122 MMHG | TEMPERATURE: 97.9 F | HEART RATE: 98 BPM | RESPIRATION RATE: 16 BRPM | OXYGEN SATURATION: 98 % | HEIGHT: 59 IN | DIASTOLIC BLOOD PRESSURE: 80 MMHG | WEIGHT: 154 LBS | BODY MASS INDEX: 31.04 KG/M2

## 2018-03-27 DIAGNOSIS — N89.8 VAGINAL DISCHARGE: ICD-10-CM

## 2018-03-27 DIAGNOSIS — E66.9 OBESITY (BMI 30-39.9): ICD-10-CM

## 2018-03-27 DIAGNOSIS — N92.6 MISSED PERIOD: ICD-10-CM

## 2018-03-27 DIAGNOSIS — F33.1 MODERATE EPISODE OF RECURRENT MAJOR DEPRESSIVE DISORDER (HCC): ICD-10-CM

## 2018-03-27 PROBLEM — R68.89 FLU-LIKE SYMPTOMS: Status: RESOLVED | Noted: 2018-01-18 | Resolved: 2018-03-27

## 2018-03-27 LAB
INT CON NEG: NEGATIVE
INT CON POS: POSITIVE
POC URINE PREGNANCY TEST: NEGATIVE

## 2018-03-27 PROCEDURE — 99213 OFFICE O/P EST LOW 20 MIN: CPT | Performed by: INTERNAL MEDICINE

## 2018-03-27 PROCEDURE — 87660 TRICHOMONAS VAGIN DIR PROBE: CPT

## 2018-03-27 PROCEDURE — 87480 CANDIDA DNA DIR PROBE: CPT

## 2018-03-27 PROCEDURE — 99214 OFFICE O/P EST MOD 30 MIN: CPT | Performed by: INTERNAL MEDICINE

## 2018-03-27 PROCEDURE — 87591 N.GONORRHOEAE DNA AMP PROB: CPT

## 2018-03-27 PROCEDURE — 87491 CHLMYD TRACH DNA AMP PROBE: CPT

## 2018-03-27 PROCEDURE — 87510 GARDNER VAG DNA DIR PROBE: CPT

## 2018-03-27 RX ORDER — NORETHINDRONE ACETATE AND ETHINYL ESTRADIOL 1.5-30(21)
1 KIT ORAL DAILY
Qty: 28 TAB | Refills: 11 | Status: SHIPPED | OUTPATIENT
Start: 2018-03-27 | End: 2018-08-15

## 2018-03-27 ASSESSMENT — PAIN SCALES - GENERAL: PAINLEVEL: 3=SLIGHT PAIN

## 2018-03-27 NOTE — ASSESSMENT & PLAN NOTE
Patient reports that about a month ago she stopped taking her Lexapro because she started having thoughts about what her kids would do if she was no longer here, and she felt like it was affecting her mood too much. At this point, she would like to stay off of medication for depression. She currently denies suicidal ideation. She stopped seeing her therapist due to lack of childcare although she felt like this was very helpful and she would love to go back. She is interested in possibly trying medical marijuana for depression and anxiety.

## 2018-03-27 NOTE — ASSESSMENT & PLAN NOTE
Patient reports that her last period was very light and abnormal and she is wondering if she might be pregnant as she has been off of her birth control pills and has been sexually active. She has not been using condoms or any other type of birth control.

## 2018-03-27 NOTE — ASSESSMENT & PLAN NOTE
Patient reports a series of UTIs over the past several months for which she was treated in the emergency room. She states that following this, she was having more vaginal discharge that has been foul smelling for the past month. She has been sexually active with one male partner and does report some mild dyspareunia as well as pelvic discomfort. She denies dysuria or hematuria currently.

## 2018-03-27 NOTE — PROGRESS NOTES
Subjective:   Isa Mcclure is a 22 y.o. female here today for vaginal discharge, follow up depression, weight gain    Vaginal discharge  Patient reports a series of UTIs over the past several months for which she was treated in the emergency room. She states that following this, she was having more vaginal discharge that has been foul smelling for the past month. She has been sexually active with one male partner and does report some mild dyspareunia as well as pelvic discomfort. She denies dysuria or hematuria currently.     Moderate episode of recurrent major depressive disorder (CMS-[facility])  Patient reports that about a month ago she stopped taking her Lexapro because she started having thoughts about what her kids would do if she was no longer here, and she felt like it was affecting her mood too much. At this point, she would like to stay off of medication for depression. She currently denies suicidal ideation. She stopped seeing her therapist due to lack of childcare although she felt like this was very helpful and she would love to go back. She is interested in possibly trying medical marijuana for depression and anxiety.     Missed period  Patient reports that her last period was very light and abnormal and she is wondering if she might be pregnant as she has been off of her birth control pills and has been sexually active. She has not been using condoms or any other type of birth control.    Obesity (BMI 30-39.9)  Patient reports that she has been gaining weight despite her efforts to lose. She states that it started after the birth of her son. Since her last visit 2 months ago, she has gained 7 pounds. She is going to start weight watchers tomorrow. States that she has been going to the gym and doing aerobics, yoga, and weight lifting for about a month but hasn't noticed any change in her weight.         Current medicines (including changes today)  Current Outpatient Prescriptions   Medication Sig Dispense  "Refill   • norethindrone-ethinyl estradiol-iron (MICROGESTIN FE1.5/30) 1.5-30 MG-MCG tablet Take 1 Tab by mouth every day. 28 Tab 11   • ondansetron (ZOFRAN ODT) 4 MG TABLET DISPERSIBLE Take 1 Tab by mouth every 6 hours as needed for Nausea. 10 Tab 0   • clobetasol (TEMOVATE) 0.05 % Ointment Apply pea sized amount to rash on hand twice daily as needed. 30 g 0     No current facility-administered medications for this visit.      She  has a past medical history of Anxiety; Depression; and Drug abuse, IV.    ROS   Denies abdominal pain, dysuria  As above in HPI     Objective:     Blood pressure 122/80, pulse 98, temperature 36.6 °C (97.9 °F), resp. rate 16, height 1.511 m (4' 11.49\"), weight 69.9 kg (154 lb), SpO2 98 %, not currently breastfeeding. Body mass index is 30.6 kg/m².   Physical Exam:  Constitutional: Alert, no distress.  Skin: Warm, dry, good turgor, no rashes in visible areas.  Eye: Equal, round and reactive, conjunctiva clear, lids normal.  Psych: Alert and oriented x3, normal affect and mood.      Results and Imaging:   POC pregnancy test in clinic today was negative.    Assessment and Plan:   The following treatment plan was discussed    1. Vaginal discharge  Patient self collected swabs for vaginal pathogens as well as GC chlamydia. We'll contact her with results.  - VAGINAL PATHOGENS DNA PANEL; Future  - CHLAMYDIA/GC PCR URINE OR SWAB; Future    2. Moderate episode of recurrent major depressive disorder (CMS-HCC)  Patient would like to remain off of medication at this time. I have encouraged her to reestablish with her therapist as this was very beneficial. She would like to look into medical marijuana and I recommended CBD oil as opposed to other options. We discussed that if her symptoms are worsening or she starts to develop any type of suicidal ideation that she needs to follow up immediately.  -Patient will reestablish with therapy and remain off medications for now which is her preference    3. " Missed period  Negative pregnancy test. Reassurance provided.  - POCT Pregnancy    4. Obesity (BMI 30-39.9)  Discussed healthy diet, exercise. Patient will start weight watchers.  - Patient identified as having weight management issue.  Appropriate orders and counseling given.        Followup: Return if symptoms worsen or fail to improve.

## 2018-03-27 NOTE — ASSESSMENT & PLAN NOTE
Patient reports that she has been gaining weight despite her efforts to lose. She states that it started after the birth of her son. Since her last visit 2 months ago, she has gained 7 pounds. She is going to start weight watchers tomorrow. States that she has been going to the gym and doing aerobics, yoga, and weight lifting for about a month but hasn't noticed any change in her weight.

## 2018-03-28 ENCOUNTER — TELEPHONE (OUTPATIENT)
Dept: MEDICAL GROUP | Facility: MEDICAL CENTER | Age: 23
End: 2018-03-28

## 2018-03-28 LAB
C TRACH DNA SPEC QL NAA+PROBE: NEGATIVE
CANDIDA DNA VAG QL PROBE+SIG AMP: NEGATIVE
G VAGINALIS DNA VAG QL PROBE+SIG AMP: POSITIVE
N GONORRHOEA DNA SPEC QL NAA+PROBE: NEGATIVE
SPECIMEN SOURCE: NORMAL
T VAGINALIS DNA VAG QL PROBE+SIG AMP: NEGATIVE

## 2018-03-28 RX ORDER — METRONIDAZOLE 500 MG/1
500 TABLET ORAL 2 TIMES DAILY
Qty: 14 TAB | Refills: 0 | Status: SHIPPED | OUTPATIENT
Start: 2018-03-28 | End: 2018-04-04

## 2018-03-28 NOTE — TELEPHONE ENCOUNTER
----- Message from Theresa Goss M.D. sent at 3/28/2018  4:14 PM PDT -----  Please inform patient her test came back positive for bacterial vaginosis which is an overgrowth of the normal bacteria in the vaginal region.  It is not sexually transmitted.  She was negative for yeast.  She should start taking metronidazole 500 mg twice daily for 1 week which I will send to her pharmacy.  We do not have the results of her gonorrhea and chlamydia testing but will notify her when we do.    Theresa Goss M.D.

## 2018-03-29 ENCOUNTER — TELEPHONE (OUTPATIENT)
Dept: MEDICAL GROUP | Facility: MEDICAL CENTER | Age: 23
End: 2018-03-29

## 2018-03-29 NOTE — TELEPHONE ENCOUNTER
----- Message from Theresa Goss M.D. sent at 3/29/2018  7:31 AM PDT -----  Please inform patient her testing for gonorrhea and chlamydia came back negative.    Theresa Goss M.D.

## 2018-05-29 ENCOUNTER — OFFICE VISIT (OUTPATIENT)
Dept: MEDICAL GROUP | Facility: MEDICAL CENTER | Age: 23
End: 2018-05-29
Attending: FAMILY MEDICINE
Payer: MEDICAID

## 2018-05-29 VITALS
RESPIRATION RATE: 14 BRPM | DIASTOLIC BLOOD PRESSURE: 78 MMHG | TEMPERATURE: 98.3 F | OXYGEN SATURATION: 96 % | HEIGHT: 59 IN | SYSTOLIC BLOOD PRESSURE: 120 MMHG | HEART RATE: 88 BPM | BODY MASS INDEX: 31.25 KG/M2 | WEIGHT: 155 LBS

## 2018-05-29 DIAGNOSIS — M54.2 PAIN IN NECK: ICD-10-CM

## 2018-05-29 PROCEDURE — 99212 OFFICE O/P EST SF 10 MIN: CPT | Performed by: FAMILY MEDICINE

## 2018-05-29 PROCEDURE — 99213 OFFICE O/P EST LOW 20 MIN: CPT | Performed by: FAMILY MEDICINE

## 2018-05-29 RX ORDER — CYCLOBENZAPRINE HCL 10 MG
10 TABLET ORAL 3 TIMES DAILY PRN
Qty: 30 TAB | Refills: 0 | Status: SHIPPED | OUTPATIENT
Start: 2018-05-29 | End: 2018-06-26

## 2018-05-29 RX ORDER — METHYLPREDNISOLONE 4 MG/1
4 TABLET ORAL DAILY
Qty: 1 KIT | Refills: 0 | Status: SHIPPED | OUTPATIENT
Start: 2018-05-29 | End: 2018-06-26

## 2018-05-29 NOTE — PROGRESS NOTES
Chief Complaint:   Chief Complaint   Patient presents with   • Neck Pain     sprain       HPI:Established patient of Dr. Goss new to me  Isa Mcclure is a 22 y.o. female who presents for follow-up and evaluation of acute neck pain after ER visit to [facility]        Pain in neck     Patient said about 3 days ago she was at home trying to stretch and all of a sudden she felt like a strain or something popped in her neck and she felt like she stretched her muscle. After that she started to have this pain and stiffness in the muscle area on the left side of her neck radiating to the upper back region on the left side, she went to emergency room at [facility] and she was told that it was a muscle strain. I have no records available from [facility] ([place] At this time, she said she was treated with naproxen and some muscle relaxants, she says is not helping her pain and that's why she is here for further evaluation. Describes the pain as 7/10  Patient denies fever or nausea, vomiting. Denies back pain. No other symptoms suggestive of systemic infection    Past medical history, family history, social history and medications reviewed and updated in the record. Today  Current medications, problem list and allergies reviewed in [facility] . Today  Health maintenance topics are reviewed and updated. Today    Patient Active Problem List    Diagnosis Date Noted   • Vaginal discharge 03/27/2018   • Missed period 03/27/2018   • Obesity (BMI 30-39.9) 03/27/2018   • Tobacco use disorder, mild, in sustained remission 11/07/2017   • Rash of hands 05/16/2017   • Moderate episode of recurrent major depressive disorder (HCC) 01/13/2017   • Anxiety 01/13/2017   • PTSD (post-traumatic stress disorder) 01/13/2017   • Methamphetamine abuse in remission 01/25/2014     Family History   Problem Relation Age of Onset   • Hypertension Mother    • Diabetes Mother    • Psychiatry Mother    • Hypertension Father    •  Psychiatry Brother      Social History     Social History   • Marital status: Single     Spouse name: N/A   • Number of children: N/A   • Years of education: N/A     Occupational History   • Not on file.     Social History Main Topics   • Smoking status: Former Smoker     Packs/day: 0.50     Years: 6.00     Types: Cigarettes     Quit date: 8/13/2015   • Smokeless tobacco: Never Used   • Alcohol use No   • Drug use: No      Comment: quit meth Aug 2015, used IV   • Sexual activity: Not Currently     Other Topics Concern   • Not on file     Social History Narrative   • No narrative on file     Current Outpatient Prescriptions   Medication Sig Dispense Refill   • MethylPREDNISolone (MEDROL DOSEPAK) 4 MG Tablet Therapy Pack Take 1 Tab by mouth every day. 1 Kit 0   • cyclobenzaprine (FLEXERIL) 10 MG Tab Take 1 Tab by mouth 3 times a day as needed. 30 Tab 0   • norethindrone-ethinyl estradiol-iron (MICROGESTIN FE1.5/30) 1.5-30 MG-MCG tablet Take 1 Tab by mouth every day. 28 Tab 11   • ondansetron (ZOFRAN ODT) 4 MG TABLET DISPERSIBLE Take 1 Tab by mouth every 6 hours as needed for Nausea. 10 Tab 0   • clobetasol (TEMOVATE) 0.05 % Ointment Apply pea sized amount to rash on hand twice daily as needed. 30 g 0     No current facility-administered medications for this visit.          Current Outpatient Prescriptions   Medication Sig Dispense Refill   • MethylPREDNISolone (MEDROL DOSEPAK) 4 MG Tablet Therapy Pack Take 1 Tab by mouth every day. 1 Kit 0   • cyclobenzaprine (FLEXERIL) 10 MG Tab Take 1 Tab by mouth 3 times a day as needed. 30 Tab 0   • norethindrone-ethinyl estradiol-iron (MICROGESTIN FE1.5/30) 1.5-30 MG-MCG tablet Take 1 Tab by mouth every day. 28 Tab 11   • ondansetron (ZOFRAN ODT) 4 MG TABLET DISPERSIBLE Take 1 Tab by mouth every 6 hours as needed for Nausea. 10 Tab 0   • clobetasol (TEMOVATE) 0.05 % Ointment Apply pea sized amount to rash on hand twice daily as needed. 30 g 0     No current facility-administered  "medications for this visit.          Review Of Systems  As documented in HPI above  PHYSICAL EXAMINATION:    /78   Pulse 88   Temp 36.8 °C (98.3 °F)   Resp 14   Ht 1.499 m (4' 11\")   Wt 70.3 kg (155 lb)   SpO2 96%   BMI 31.31 kg/m²   Gen.: Well-developed, well-nourished, overweight, no apparent distress, pleasant and cooperative with the examination  HEENT: Normocephalic/atraumatic, sinuses nontender with palpation, TMs clear, nares patent with pink mucosa and clear rhinorrhea, oropharynx clear  Neck: Noted mild discomfort when I was pressing on the muscles on the back of the left side of the neck, no neck rigidity, negative Babinski. No signs of meningeal irritation  Cor: Regular rate and rhythm without murmur gallop or rub  Lungs: Clear to auscultation with equal breath sounds bilaterally. No wheezes, rhonchi.    Extremities: No cyanosis, clubbing or edema          ASSESSMENT/Plan:  1. Pain in neck  Acute pain, likely muscular in origin, associated with some inflammation. I will treat patient with Medrol Dosepak. Continue muscle relaxation. With the use of Flexeril up to 3 times a day as needed. Follow-up with PCP if not resolved, will get records from .    MethylPREDNISolone (MEDROL DOSEPAK) 4 MG Tablet Therapy Pack    cyclobenzaprine (FLEXERIL) 10 MG Tab       Please note that this dictation was created using voice recognition software. I have made every reasonable attempt to correct obvious errors but there may be errors of grammar and content that I may have overlooked prior to finalization of this note.       "

## 2018-06-26 ENCOUNTER — OFFICE VISIT (OUTPATIENT)
Dept: MEDICAL GROUP | Facility: MEDICAL CENTER | Age: 23
End: 2018-06-26
Attending: INTERNAL MEDICINE
Payer: MEDICAID

## 2018-06-26 ENCOUNTER — HOSPITAL ENCOUNTER (OUTPATIENT)
Facility: MEDICAL CENTER | Age: 23
End: 2018-06-26
Attending: INTERNAL MEDICINE
Payer: MEDICAID

## 2018-06-26 VITALS
HEIGHT: 59 IN | TEMPERATURE: 98.5 F | BODY MASS INDEX: 31.04 KG/M2 | HEART RATE: 86 BPM | RESPIRATION RATE: 16 BRPM | SYSTOLIC BLOOD PRESSURE: 102 MMHG | DIASTOLIC BLOOD PRESSURE: 70 MMHG | OXYGEN SATURATION: 98 % | WEIGHT: 154 LBS

## 2018-06-26 DIAGNOSIS — L02.91 ABSCESS: ICD-10-CM

## 2018-06-26 DIAGNOSIS — N30.00 ACUTE CYSTITIS WITHOUT HEMATURIA: ICD-10-CM

## 2018-06-26 DIAGNOSIS — N89.8 VAGINAL ITCHING: ICD-10-CM

## 2018-06-26 DIAGNOSIS — R30.0 DYSURIA: ICD-10-CM

## 2018-06-26 PROBLEM — F17.201 TOBACCO USE DISORDER, MILD, IN SUSTAINED REMISSION: Status: RESOLVED | Noted: 2017-11-07 | Resolved: 2018-06-26

## 2018-06-26 LAB
APPEARANCE UR: CLEAR
BILIRUB UR STRIP-MCNC: NORMAL MG/DL
COLOR UR AUTO: YELLOW
GLUCOSE UR STRIP.AUTO-MCNC: NEGATIVE MG/DL
KETONES UR STRIP.AUTO-MCNC: NEGATIVE MG/DL
LEUKOCYTE ESTERASE UR QL STRIP.AUTO: NORMAL
NITRITE UR QL STRIP.AUTO: NEGATIVE
PH UR STRIP.AUTO: 8 [PH] (ref 5–8)
PROT UR QL STRIP: NEGATIVE MG/DL
RBC UR QL AUTO: NORMAL
SP GR UR STRIP.AUTO: 1.01
UROBILINOGEN UR STRIP-MCNC: NEGATIVE MG/DL

## 2018-06-26 PROCEDURE — 87591 N.GONORRHOEAE DNA AMP PROB: CPT

## 2018-06-26 PROCEDURE — 99213 OFFICE O/P EST LOW 20 MIN: CPT | Performed by: INTERNAL MEDICINE

## 2018-06-26 PROCEDURE — 87186 SC STD MICRODIL/AGAR DIL: CPT

## 2018-06-26 PROCEDURE — 87480 CANDIDA DNA DIR PROBE: CPT

## 2018-06-26 PROCEDURE — 87510 GARDNER VAG DNA DIR PROBE: CPT

## 2018-06-26 PROCEDURE — 87086 URINE CULTURE/COLONY COUNT: CPT

## 2018-06-26 PROCEDURE — 87660 TRICHOMONAS VAGIN DIR PROBE: CPT

## 2018-06-26 PROCEDURE — 99214 OFFICE O/P EST MOD 30 MIN: CPT | Performed by: INTERNAL MEDICINE

## 2018-06-26 PROCEDURE — 87077 CULTURE AEROBIC IDENTIFY: CPT

## 2018-06-26 PROCEDURE — 87491 CHLMYD TRACH DNA AMP PROBE: CPT

## 2018-06-26 RX ORDER — NITROFURANTOIN 25; 75 MG/1; MG/1
100 CAPSULE ORAL 2 TIMES DAILY
Qty: 14 CAP | Refills: 0 | Status: SHIPPED | OUTPATIENT
Start: 2018-06-26 | End: 2018-06-26

## 2018-06-26 RX ORDER — SULFAMETHOXAZOLE AND TRIMETHOPRIM 800; 160 MG/1; MG/1
1 TABLET ORAL 2 TIMES DAILY
Qty: 14 TAB | Refills: 0 | Status: SHIPPED | OUTPATIENT
Start: 2018-06-26 | End: 2018-07-03

## 2018-06-26 ASSESSMENT — PAIN SCALES - GENERAL: PAINLEVEL: 5=MODERATE PAIN

## 2018-06-26 NOTE — ASSESSMENT & PLAN NOTE
Patient complains of painful urination and urinary hesitancy and urgency for the past few days. She denies fevers and chills. She's having some mild suprapubic discomfort. Denies hematuria or flank pain. She has a history of recurrent UTIs, last treated in April with Bactrim.

## 2018-06-27 DIAGNOSIS — N89.8 VAGINAL ITCHING: ICD-10-CM

## 2018-06-27 DIAGNOSIS — R30.0 DYSURIA: ICD-10-CM

## 2018-06-27 NOTE — ASSESSMENT & PLAN NOTE
Patient reports that since the start of her urinary symptoms, she has also been experiencing vaginal itching and a foul smell in the area. She denies significant discharge. She has been sexually active with one male partner and has not been using condoms.

## 2018-06-27 NOTE — ASSESSMENT & PLAN NOTE
Patient history of recurrent abscesses, sometimes in her armpits or groin but sometimes on her abdomen and face. Today, she has a painful raised indurated area over her lower jaw that has been there for several days. She reports a lesion right next to it that drained quite a bit of pus a few days ago. She denies fevers and chills. She also has a smaller indurated painful lesion on her abdomen for the past few days.

## 2018-06-27 NOTE — PROGRESS NOTES
"Subjective:   Isa Mcclure is a 23 y.o. female here today for dysuria and vaginal itching x 3 days    Dysuria  Patient complains of painful urination and urinary hesitancy and urgency for the past few days. She denies fevers and chills. She's having some mild suprapubic discomfort. Denies hematuria or flank pain. She has a history of recurrent UTIs, last treated in April with Bactrim.     Abscess  Patient history of recurrent abscesses, sometimes in her armpits or groin but sometimes on her abdomen and face. Today, she has a painful raised indurated area over her lower jaw that has been there for several days. She reports a lesion right next to it that drained quite a bit of pus a few days ago. She denies fevers and chills. She also has a smaller indurated painful lesion on her abdomen for the past few days.    Vaginal itching  Patient reports that since the start of her urinary symptoms, she has also been experiencing vaginal itching and a foul smell in the area. She denies significant discharge. She has been sexually active with one male partner and has not been using condoms.       Current medicines (including changes today)  Current Outpatient Prescriptions   Medication Sig Dispense Refill   • sulfamethoxazole-trimethoprim (BACTRIM DS) 800-160 MG tablet Take 1 Tab by mouth 2 times a day for 7 days. 14 Tab 0   • norethindrone-ethinyl estradiol-iron (MICROGESTIN FE1.5/30) 1.5-30 MG-MCG tablet Take 1 Tab by mouth every day. 28 Tab 11   • clobetasol (TEMOVATE) 0.05 % Ointment Apply pea sized amount to rash on hand twice daily as needed. 30 g 0     No current facility-administered medications for this visit.      She  has a past medical history of Anxiety; Depression; and Drug abuse, IV.    ROS   As above in HPI  Denies dental pain, abscess     Objective:     Blood pressure 102/70, pulse 86, temperature 36.9 °C (98.5 °F), resp. rate 16, height 1.499 m (4' 11.02\"), weight 69.9 kg (154 lb), SpO2 98 %, not currently " breastfeeding. Body mass index is 31.09 kg/m².   Physical Exam:  Constitutional: Alert, no distress.  Skin: Warm, dry, good turgor, approx 1 cm raised indurated lesion over right chin without fluctuance, approx 2mm raised inflammed pimple on abdomen  Abdomen: Soft, mild tenderness to palpation in suprapubic region, no rebound or guarding no masses, no hepatosplenomegaly.  Back: no CVA tenderness      Results and Imaging:   POC UA in clinic positive for moderate leuk esterase and blood, negative nitrate    Assessment and Plan:   The following treatment plan was discussed    1. Dysuria  - POCT Urinalysis  - URINE CULTURE(NEW); Future    2. Vaginal itching  - VAGINAL PATHOGENS DNA PANEL; Future  - CHLAMYDIA/GC PCR URINE OR SWAB; Future    3. Abscess  Because the facial lesion is large, I have opted to treat her with Bactrim for 7 days for dual therapy both to help with this and her UTI.  - sulfamethoxazole-trimethoprim (BACTRIM DS) 800-160 MG tablet; Take 1 Tab by mouth 2 times a day for 7 days.  Dispense: 14 Tab; Refill: 0    4. Acute cystitis without hematuria  Extended duration because we are also treating for her skin infection  - sulfamethoxazole-trimethoprim (BACTRIM DS) 800-160 MG tablet; Take 1 Tab by mouth 2 times a day for 7 days.  Dispense: 14 Tab; Refill: 0        Followup: Return if symptoms worsen or fail to improve.

## 2018-06-28 RX ORDER — METRONIDAZOLE 500 MG/1
500 TABLET ORAL 2 TIMES DAILY
Qty: 14 TAB | Refills: 0 | Status: SHIPPED | OUTPATIENT
Start: 2018-06-28 | End: 2018-07-05

## 2018-06-29 LAB
BACTERIA UR CULT: ABNORMAL
BACTERIA UR CULT: ABNORMAL
SIGNIFICANT IND 70042: ABNORMAL
SITE SITE: ABNORMAL
SOURCE SOURCE: ABNORMAL

## 2018-08-15 ENCOUNTER — OFFICE VISIT (OUTPATIENT)
Dept: MEDICAL GROUP | Facility: MEDICAL CENTER | Age: 23
End: 2018-08-15
Attending: INTERNAL MEDICINE
Payer: MEDICAID

## 2018-08-15 VITALS
WEIGHT: 161 LBS | BODY MASS INDEX: 32.46 KG/M2 | DIASTOLIC BLOOD PRESSURE: 82 MMHG | HEIGHT: 59 IN | OXYGEN SATURATION: 100 % | SYSTOLIC BLOOD PRESSURE: 108 MMHG | TEMPERATURE: 98.2 F | RESPIRATION RATE: 16 BRPM | HEART RATE: 86 BPM

## 2018-08-15 DIAGNOSIS — F33.1 MODERATE EPISODE OF RECURRENT MAJOR DEPRESSIVE DISORDER (HCC): ICD-10-CM

## 2018-08-15 DIAGNOSIS — F30.8 OTHER MANIC EPISODES (HCC): ICD-10-CM

## 2018-08-15 DIAGNOSIS — J01.90 ACUTE NON-RECURRENT SINUSITIS, UNSPECIFIED LOCATION: ICD-10-CM

## 2018-08-15 DIAGNOSIS — F41.9 ANXIETY: ICD-10-CM

## 2018-08-15 PROBLEM — L02.91 ABSCESS: Status: RESOLVED | Noted: 2018-06-26 | Resolved: 2018-08-15

## 2018-08-15 PROBLEM — R21 RASH OF HANDS: Status: RESOLVED | Noted: 2017-05-16 | Resolved: 2018-08-15

## 2018-08-15 PROBLEM — R30.0 DYSURIA: Status: RESOLVED | Noted: 2018-06-26 | Resolved: 2018-08-15

## 2018-08-15 PROBLEM — N89.8 VAGINAL ITCHING: Status: RESOLVED | Noted: 2018-06-26 | Resolved: 2018-08-15

## 2018-08-15 PROBLEM — N92.6 MISSED PERIOD: Status: RESOLVED | Noted: 2018-03-27 | Resolved: 2018-08-15

## 2018-08-15 PROBLEM — N30.00 ACUTE CYSTITIS: Status: RESOLVED | Noted: 2018-06-26 | Resolved: 2018-08-15

## 2018-08-15 PROCEDURE — 99214 OFFICE O/P EST MOD 30 MIN: CPT | Performed by: INTERNAL MEDICINE

## 2018-08-15 PROCEDURE — 99213 OFFICE O/P EST LOW 20 MIN: CPT | Performed by: INTERNAL MEDICINE

## 2018-08-15 RX ORDER — AMOXICILLIN AND CLAVULANATE POTASSIUM 875; 125 MG/1; MG/1
1 TABLET, FILM COATED ORAL 2 TIMES DAILY
Qty: 14 TAB | Refills: 0 | Status: SHIPPED | OUTPATIENT
Start: 2018-08-15 | End: 2018-08-22

## 2018-08-15 ASSESSMENT — PAIN SCALES - GENERAL: PAINLEVEL: 6=MODERATE PAIN

## 2018-08-15 NOTE — ASSESSMENT & PLAN NOTE
Patient reports worsening depression lately.  States that she has been having more suicidal thoughts.  States that she is not sure if she would act on them.  She lives with her parents and her children, and feels like if she had an emergency psychologically, she could go to her parents for help.  She was using medical marijuana to try to control her depression but states that it has not been helping.  In the past, we have tried Zoloft but this caused nausea, difficulty eating, and insomnia at the 50 mg dose.  We have tried Wellbutrin which worked initially but then stopped being effective after several months.  We have also tried Lexapro and she had suicidal thoughts while taking it.  She was previously seeing a therapist but stopped after that therapist the left the office.  She is interested in getting reestablished with someone.

## 2018-08-15 NOTE — ASSESSMENT & PLAN NOTE
Patient reports that for the past 2 weeks, she has had severe nasal congestion and frontal headaches.  Denies fevers or chills.  Denies sick contacts.  States that she feels increased pressure behind her eyes especially when leaning forward.  Has been taking over-the-counter ibuprofen to help with the headaches and Afrin to relieve the congestion.  Has tried Mucinex which has not been helpful.  Denies ear pain but reports sensation of fullness and muffled hearing.  Has started to have a mild sore throat related to postnasal drip with some nausea when the postnasal drip is copious.  Has started to have a mild cough productive of mucus with some mild shortness of breath as well.

## 2018-08-15 NOTE — ASSESSMENT & PLAN NOTE
Patient reports that over the past several months, she has been experiencing episodes where she feels like she has to much energy.  She is unable to sleep during this time.  She reports impulsive behaviors.  States that they happen rarely but when they do, they scare her because she has so much energy.  One example she gives today is that during an energy episode, she cooked way too much food even though she knew that there was no one there to eat it.  She has never been evaluated formally by psychiatry.  We have treated her in the past with numerous antidepressants which have not been effective.

## 2018-08-15 NOTE — PROGRESS NOTES
Subjective:   Isa Mcclure is a 23 y.o. female here today for concern for sinus infection ×2 weeks, worsening depression with energetic episodes    Other manic episodes (HCC)  Patient reports that over the past several months, she has been experiencing episodes where she feels like she has to much energy.  She is unable to sleep during this time.  She reports impulsive behaviors.  States that they happen rarely but when they do, they scare her because she has so much energy.  One example she gives today is that during an energy episode, she cooked way too much food even though she knew that there was no one there to eat it.  She has never been evaluated formally by psychiatry.  We have treated her in the past with numerous antidepressants which have not been effective.    Moderate episode of recurrent major depressive disorder (CMS-HCC) (HCC)  Patient reports worsening depression lately.  States that she has been having more suicidal thoughts.  States that she is not sure if she would act on them.  She lives with her parents and her children, and feels like if she had an emergency psychologically, she could go to her parents for help.  She was using medical marijuana to try to control her depression but states that it has not been helping.  In the past, we have tried Zoloft but this caused nausea, difficulty eating, and insomnia at the 50 mg dose.  We have tried Wellbutrin which worked initially but then stopped being effective after several months.  We have also tried Lexapro and she had suicidal thoughts while taking it.  She was previously seeing a therapist but stopped after that therapist the left the office.  She is interested in getting reestablished with someone.    Acute sinusitis  Patient reports that for the past 2 weeks, she has had severe nasal congestion and frontal headaches.  Denies fevers or chills.  Denies sick contacts.  States that she feels increased pressure behind her eyes especially when  "leaning forward.  Has been taking over-the-counter ibuprofen to help with the headaches and Afrin to relieve the congestion.  Has tried Mucinex which has not been helpful.  Denies ear pain but reports sensation of fullness and muffled hearing.  Has started to have a mild sore throat related to postnasal drip with some nausea when the postnasal drip is copious.  Has started to have a mild cough productive of mucus with some mild shortness of breath as well.       Current medicines (including changes today)  Current Outpatient Prescriptions   Medication Sig Dispense Refill   • amoxicillin-clavulanate (AUGMENTIN) 875-125 MG Tab Take 1 Tab by mouth 2 times a day for 7 days. 14 Tab 0     No current facility-administered medications for this visit.      She  has a past medical history of Anxiety; Depression; and Drug abuse, IV.    ROS   As above in HPI     Objective:     Blood pressure 108/82, pulse 86, temperature 36.8 °C (98.2 °F), resp. rate 16, height 1.499 m (4' 11.02\"), weight 73 kg (161 lb), last menstrual period 08/08/2018, SpO2 100 %. Body mass index is 32.5 kg/m².   Physical Exam:  Constitutional: Alert, no distress.  Skin: Warm, dry, good turgor, no rashes in visible areas.  Eye: Equal, round and reactive, conjunctiva clear, lids normal.  ENMT: Lips without lesions, good dentition, oropharynx clear, TM's clear bilaterally, turbinates injected bilaterally, tenderness to palpation over bilateral frontal sinuses  Neck: Trachea midline, no masses, no thyromegaly. No cervical or supraclavicular lymphadenopathy  Respiratory: Unlabored respiratory effort, lungs clear to auscultation, no wheezes, no ronchi.  Psych: Alert and oriented x3, depressed mood, flat affect, currently denies suicidal ideation.      Assessment and Plan:   The following treatment plan was discussed    1. Other manic episodes (HCC)  This is new for the patient for the past several months.  She has never been formally evaluated by psychiatry which " I think would be wise at this point given her failure to respond to 3 antidepressants in the past.  I would like her to be screened for bipolar or other underlying mental health disorder.  She would also like to reestablish with therapy and I have placed a referral for that today.  - REFERRAL TO PSYCHIATRY  - REFERRAL TO PSYCHOLOGY    2. Moderate episode of recurrent major depressive disorder (HCC)  As discussed above.  I am concerned because she is having more suicidal thoughts although she denies any currently and feels that she does have a support system at home.  We discussed that if things get very bad, she can always go to the emergency room for help.  - REFERRAL TO PSYCHIATRY  - REFERRAL TO PSYCHOLOGY    3. Anxiety  - REFERRAL TO PSYCHIATRY  - REFERRAL TO PSYCHOLOGY    4. Acute non-recurrent sinusitis, unspecified location  - amoxicillin-clavulanate (AUGMENTIN) 875-125 MG Tab; Take 1 Tab by mouth 2 times a day for 7 days.  Dispense: 14 Tab; Refill: 0        Followup: Return if symptoms worsen or fail to improve.

## 2018-11-06 ENCOUNTER — OFFICE VISIT (OUTPATIENT)
Dept: MEDICAL GROUP | Facility: MEDICAL CENTER | Age: 23
End: 2018-11-06
Attending: INTERNAL MEDICINE
Payer: MEDICAID

## 2018-11-06 VITALS
HEIGHT: 59 IN | RESPIRATION RATE: 16 BRPM | SYSTOLIC BLOOD PRESSURE: 120 MMHG | DIASTOLIC BLOOD PRESSURE: 78 MMHG | HEART RATE: 60 BPM | TEMPERATURE: 99.2 F | BODY MASS INDEX: 33.06 KG/M2 | WEIGHT: 164 LBS | OXYGEN SATURATION: 100 %

## 2018-11-06 DIAGNOSIS — F99 PSYCHIATRIC DISORDER: ICD-10-CM

## 2018-11-06 PROCEDURE — 99212 OFFICE O/P EST SF 10 MIN: CPT | Performed by: INTERNAL MEDICINE

## 2018-11-06 PROCEDURE — 99213 OFFICE O/P EST LOW 20 MIN: CPT | Performed by: NURSE PRACTITIONER

## 2018-11-06 RX ORDER — QUETIAPINE FUMARATE 200 MG/1
200 TABLET, FILM COATED ORAL 2 TIMES DAILY
Qty: 60 TAB | Refills: 0 | Status: SHIPPED | OUTPATIENT
Start: 2018-11-06 | End: 2019-04-03

## 2018-11-06 RX ORDER — HYDROXYZINE PAMOATE 100 MG
100 CAPSULE ORAL
COMMUNITY
Start: 2018-10-10 | End: 2018-11-06 | Stop reason: SDUPTHER

## 2018-11-06 RX ORDER — HYDROXYZINE PAMOATE 100 MG
100 CAPSULE ORAL
Qty: 30 CAP | Refills: 0 | Status: SHIPPED | OUTPATIENT
Start: 2018-11-06 | End: 2019-04-03

## 2018-11-06 RX ORDER — PRAZOSIN HYDROCHLORIDE 1 MG/1
CAPSULE ORAL
COMMUNITY
Start: 2018-10-05 | End: 2018-11-06

## 2018-11-06 RX ORDER — HYDROXYZINE PAMOATE 50 MG/1
CAPSULE ORAL
COMMUNITY
Start: 2018-09-19 | End: 2018-11-06

## 2018-11-06 RX ORDER — QUETIAPINE FUMARATE 100 MG/1
TABLET, FILM COATED ORAL
COMMUNITY
Start: 2018-09-19 | End: 2018-11-06

## 2018-11-06 RX ORDER — QUETIAPINE FUMARATE 200 MG/1
200 TABLET, FILM COATED ORAL 2 TIMES DAILY
COMMUNITY
Start: 2018-09-26 | End: 2018-11-06 | Stop reason: SDUPTHER

## 2018-11-06 RX ORDER — MIRTAZAPINE 15 MG/1
TABLET, FILM COATED ORAL
COMMUNITY
Start: 2018-10-05 | End: 2018-11-06

## 2018-11-06 RX ORDER — CLONAZEPAM 1 MG/1
1 TABLET ORAL 3 TIMES DAILY
COMMUNITY
Start: 2018-09-26 | End: 2019-07-21

## 2018-11-06 NOTE — PROGRESS NOTES
"Chief Complaint:   Chief Complaint   Patient presents with   • Medication Refill     psych meds. Unable to keep appts with [facility], will be seen at [facility] on 11/26       HPI:  Isa is here today for a follow-up on Psych Meds  Her PCP, Dr. Goss is not available to see her today.    Her PMh includes Bipolar, PTSD, Anxiety, manic Episodes  IVDU hx/substance abuse.    Review of Records:  8/15/18 Clinic appt w Dr Goss for reported manic episodes, depression, sinus infection. Had failed numerous SSRI meds and even medical marijuana. Referred to Psychiatry and Psychology.  RX Amoxicillin.    Nv  Report:  9/26/18 Clonazepam 0.5 mg # 20 by Giovana Zavala( Psychiatry-[facility]))  --------------------------------------------------------------------------------  Psychiatric disorder  Hx as above.  Reports she missed a number of appt's w Psychiatry- Dr Fisher and is now unable to get Psych Meds from that office. Is seeing Counselor at [facility] and reports they are making appt for Isa to see their Medication Management Psychiatry Provider.    States had \"terrible\" experience w [facility] Psychiatry and not willing to go back to see them for Psychiatry.    Has been out of these meds for 4 days and feels nausea.  Denies suicidal ideation. Here w young son.  She reports she needs refills of Seroquel and Hydroxyzine until she can re-establish w Psychiatry.  [facility].      Patient Active Problem List    Diagnosis Date Noted   • Psychiatric disorder 11/06/2018   • Other manic episodes (HCC) 08/15/2018   • Acute sinusitis 08/15/2018   • Obesity (BMI 30-39.9) 03/27/2018   • Moderate episode of recurrent major depressive disorder (HCC) 01/13/2017   • Anxiety 01/13/2017   • PTSD (post-traumatic stress disorder) 01/13/2017   • Methamphetamine abuse in remission ([facility]) 01/25/2014       Allergies:Hydrocodone    Medicines as of today:  Current Outpatient Prescriptions   Medication " "Sig Dispense Refill   • clonazePAM (KLONOPIN) 0.5 MG Tab Take 0.5 mg by mouth every day.     • hydrOXYzine pamoate (VISTARIL) 100 MG Cap Take 1 Cap by mouth every bedtime. 30 Cap 0   • QUEtiapine (SEROQUEL) 200 MG Tab Take 1 Tab by mouth 2 times a day. 60 Tab 0     No current facility-administered medications for this visit.        Social History   Substance Use Topics   • Smoking status: Former Smoker     Packs/day: 0.50     Years: 6.00     Types: Cigarettes     Quit date: 8/13/2015   • Smokeless tobacco: Never Used   • Alcohol use No       Past Medical History:   Diagnosis Date   • Anxiety    • Depression    • Drug abuse, IV        Family History   Problem Relation Age of Onset   • Hypertension Mother    • Diabetes Mother    • Psychiatry Mother    • Hypertension Father    • Psychiatry Brother        ROS:  Review of Systems   See HPI Above    Exam:  Blood pressure 120/78, pulse 60, temperature 37.3 °C (99.2 °F), temperature source Temporal, resp. rate 16, height 1.499 m (4' 11.02\"), weight 74.4 kg (164 lb), SpO2 100 %, not currently breastfeeding. Body mass index is 33.11 kg/m².    General:  Well nourished, over-weight, well developed female in NAD  HENT:Head is grossly normal. PERRL.  Neck: Supple. Trachea is midline.  Pulmonary: Speaks easily in full sentences. Normal effort.  Cardiovascular: Regular rate and rhythm.  Abdomen-Abdomen is soft  Upper extremities- WNL  Lower extremities- neg for edema  Neuro- A & O x 4. Speech clear and appropriate.    Current medications, allergies, and problem list reviewed with patient and updated in [facility] today.    Assessment/Plan:  1. Psychiatric disorder  hydrOXYzine pamoate (VISTARIL) 100 MG Cap refill x 30 days    QUEtiapine (SEROQUEL) 200 MG Tab refill x 30 days  To keep appt through [facility] for [facility] Psychiatry Consult end of November.  Continue Therapy w [facility].   Call/return if any concerns.    Return in about 6 months (around 5/6/2019) for w PCP for check up.  "

## 2019-04-03 ENCOUNTER — OFFICE VISIT (OUTPATIENT)
Dept: MEDICAL GROUP | Facility: MEDICAL CENTER | Age: 24
End: 2019-04-03
Attending: INTERNAL MEDICINE
Payer: MEDICAID

## 2019-04-03 VITALS
WEIGHT: 161 LBS | BODY MASS INDEX: 32.46 KG/M2 | TEMPERATURE: 97.6 F | SYSTOLIC BLOOD PRESSURE: 110 MMHG | HEIGHT: 59 IN | DIASTOLIC BLOOD PRESSURE: 72 MMHG | RESPIRATION RATE: 16 BRPM | OXYGEN SATURATION: 98 % | HEART RATE: 96 BPM

## 2019-04-03 DIAGNOSIS — R07.89 COSTOCHONDRAL CHEST PAIN: ICD-10-CM

## 2019-04-03 PROCEDURE — 99212 OFFICE O/P EST SF 10 MIN: CPT | Performed by: INTERNAL MEDICINE

## 2019-04-03 PROCEDURE — 99214 OFFICE O/P EST MOD 30 MIN: CPT | Performed by: INTERNAL MEDICINE

## 2019-04-03 RX ORDER — ALBUTEROL SULFATE 90 UG/1
2 AEROSOL, METERED RESPIRATORY (INHALATION) EVERY 6 HOURS PRN
Qty: 8.5 G | Refills: 3 | Status: SHIPPED | OUTPATIENT
Start: 2019-04-03 | End: 2019-07-30

## 2019-04-03 RX ORDER — BENZONATATE 200 MG/1
200 CAPSULE ORAL 3 TIMES DAILY PRN
Qty: 30 CAP | Refills: 0 | Status: SHIPPED | OUTPATIENT
Start: 2019-04-03 | End: 2019-04-17

## 2019-04-03 RX ORDER — OXYCODONE HYDROCHLORIDE AND ACETAMINOPHEN 5; 325 MG/1; MG/1
1 TABLET ORAL EVERY 8 HOURS PRN
Qty: 21 TAB | Refills: 0 | Status: SHIPPED | OUTPATIENT
Start: 2019-04-03 | End: 2019-04-10

## 2019-04-03 RX ORDER — FLUOXETINE 10 MG/1
10 CAPSULE ORAL 2 TIMES DAILY
COMMUNITY
End: 2019-07-21

## 2019-04-03 ASSESSMENT — PAIN SCALES - GENERAL: PAINLEVEL: 4=SLIGHT-MODERATE PAIN

## 2019-04-04 NOTE — ASSESSMENT & PLAN NOTE
Patient reports recently being diagnosed with influenza and bronchitis approximately 3 weeks ago.  She was treated with steroids and an antibiotic at the time.  Her symptoms persisted and she represented to the ER with cough and now left-sided rib pain.  She was given another round of prednisone for a week.  She continues to have the pain.  She has been taking 500 mg of Tylenol and 600 mg of ibuprofen twice a day without relief.  She continues to cough although it is improving.  She denies any overlying skin rashes or lesions.  She is trying to find some relief from the chest pain but is unsure what to do next.  She was having so much pain yesterday that she went to the [place]'s emergency room.  States she was given a Valium while she was there which did not help.  They did a chest x-ray and told her there did not appear to be any broken ribs and her underlying pulmonary process had resolved although we do not have these records for review.  She continues to cough intermittently and has pain when she coughs but also pain at rest.  She has pain when she moves her arms.  She is having difficulty sleeping at night due to the pain and it is significantly interfering with her activities of daily living as well.

## 2019-04-04 NOTE — PROGRESS NOTES
Subjective:   Isa Mcclure is a 23 y.o. female here today for chest pain    Costochondral chest pain  Patient reports recently being diagnosed with influenza and bronchitis approximately 3 weeks ago.  She was treated with steroids and an antibiotic at the time.  Her symptoms persisted and she represented to the ER with cough and now left-sided rib pain.  She was given another round of prednisone for a week.  She continues to have the pain.  She has been taking 500 mg of Tylenol and 600 mg of ibuprofen twice a day without relief.  She continues to cough although it is improving.  She denies any overlying skin rashes or lesions.  She is trying to find some relief from the chest pain but is unsure what to do next.  She was having so much pain yesterday that she went to the [facility] emergency room.  States she was given a Valium while she was there which did not help.  They did a chest x-ray and told her there did not appear to be any broken ribs and her underlying pulmonary process had resolved although we do not have these records for review.  She continues to cough intermittently and has pain when she coughs but also pain at rest.  She has pain when she moves her arms.  She is having difficulty sleeping at night due to the pain and it is significantly interfering with her activities of daily living as well.       Current medicines (including changes today)  Current Outpatient Prescriptions   Medication Sig Dispense Refill   • FLUoxetine (PROZAC) 10 MG Cap Take 10 mg by mouth 2 times a day.     • NS SOLN 60 mL with albuterol 2.5 mg/0.5 mL NEBU 5 mL 5 mg/hr by Nebulization route.     • albuterol 108 (90 Base) MCG/ACT Aero Soln inhalation aerosol Inhale 2 Puffs by mouth every 6 hours as needed for Shortness of Breath. 8.5 g 3   • benzonatate (TESSALON) 200 MG capsule Take 1 Cap by mouth 3 times a day as needed for Cough. 30 Cap 0   • oxyCODONE-acetaminophen (PERCOCET) 5-325 MG Tab Take 1 Tab by mouth every 8 hours  as needed for up to 7 days. 21 Tab 0   • clonazePAM (KLONOPIN) 0.5 MG Tab Take 0.5 mg by mouth every day.       No current facility-administered medications for this visit.      She  has a past medical history of Anxiety; Depression; and Drug abuse, IV (HCC).    ROS   Denies chest pain, shortness of breath  As above in HPI     Objective:     Vitals:    04/03/19 1652   BP: 110/72   Pulse: 96   Resp: 16   Temp: 36.4 °C (97.6 °F)   SpO2: 98%        Body mass index is 32.5 kg/m².   Physical Exam:  Constitutional: Alert, no distress.  Skin: Warm, dry, good turgor, no rashes in visible areas.  Eye: Equal, round and reactive, conjunctiva clear, lids normal.  Chest: Moderate to severeTenderness to palpation over left lateral ribs around the bra line with no overlying skin lesions or rashes  Respiratory: Unlabored respiratory effort, lungs clear to auscultation, no wheezes, no ronchi.      Assessment and Plan:   The following treatment plan was discussed    1. Costochondral chest pain  Residual after influenza and bronchitis.  We will attempt to control her cough with Tessalon Perles.  She has had 2 rounds of steroids and has been taking maximum doses of Tylenol and ibuprofen without it for we will give her a small supply of Percocet to use as needed.  We discussed that she should not take more than 3 tablets in a day, she not take alcohol or her clonazepam when she takes the Percocet, should use no other drugs while taking the Percocet.  We discussed the risks and benefits of this medication and she signed an opiate consent.  - benzonatate (TESSALON) 200 MG capsule; Take 1 Cap by mouth 3 times a day as needed for Cough.  Dispense: 30 Cap; Refill: 0  - oxyCODONE-acetaminophen (PERCOCET) 5-325 MG Tab; Take 1 Tab by mouth every 8 hours as needed for up to 7 days.  Dispense: 21 Tab; Refill: 0  - Consent for Opiate Prescription        Followup: Return if symptoms worsen or fail to improve.

## 2019-04-17 ENCOUNTER — OFFICE VISIT (OUTPATIENT)
Dept: MEDICAL GROUP | Facility: MEDICAL CENTER | Age: 24
End: 2019-04-17
Attending: INTERNAL MEDICINE
Payer: MEDICAID

## 2019-04-17 VITALS
RESPIRATION RATE: 16 BRPM | HEART RATE: 86 BPM | SYSTOLIC BLOOD PRESSURE: 118 MMHG | OXYGEN SATURATION: 95 % | WEIGHT: 155 LBS | HEIGHT: 59 IN | TEMPERATURE: 97.6 F | BODY MASS INDEX: 31.25 KG/M2 | DIASTOLIC BLOOD PRESSURE: 82 MMHG

## 2019-04-17 DIAGNOSIS — L30.9 ECZEMA, UNSPECIFIED TYPE: ICD-10-CM

## 2019-04-17 PROBLEM — J01.90 ACUTE SINUSITIS: Status: RESOLVED | Noted: 2018-08-15 | Resolved: 2019-04-17

## 2019-04-17 PROBLEM — R07.89 COSTOCHONDRAL CHEST PAIN: Status: RESOLVED | Noted: 2019-04-03 | Resolved: 2019-04-17

## 2019-04-17 PROCEDURE — 99212 OFFICE O/P EST SF 10 MIN: CPT | Performed by: INTERNAL MEDICINE

## 2019-04-17 PROCEDURE — 99214 OFFICE O/P EST MOD 30 MIN: CPT | Performed by: INTERNAL MEDICINE

## 2019-04-17 RX ORDER — METHYLPREDNISOLONE 4 MG/1
4 TABLET ORAL DAILY
Qty: 1 KIT | Refills: 0 | Status: SHIPPED | OUTPATIENT
Start: 2019-04-17 | End: 2019-05-23

## 2019-04-17 ASSESSMENT — PAIN SCALES - GENERAL: PAINLEVEL: 8=MODERATE-SEVERE PAIN

## 2019-04-17 NOTE — ASSESSMENT & PLAN NOTE
She presents today with a severe outbreak of eczema on her hands.  Again, she describes the outbreak as starting as a bunch of small blisters under the skin that are extremely itchy.  When they break through the skin, then she gets a thick callus with cracking and open wounds.  She currently is experiencing this on both hands, left worse than right with a deep crack over the palmar aspect between the third and fourth fingers on the left hand as well as raw skin in between the fingers.  She has been using gloves whenever she washes the dishes, changes her baby, or does anything where her hands will be exposed to water or chemicals.  She has been using topical corticosteroid cream for about the past year.  At times it will work and keep things at bay but she has noticed worsening lately.  She states that at this point, her hands are too painful to continue to put any topical products on.  She is willing to see a dermatologist to help with this issue given the severity of her symptoms.

## 2019-04-17 NOTE — PROGRESS NOTES
Subjective:   Isa Mcclure is a 23 y.o. female here today for worsening eczema on hands    Eczema  She presents today with a severe outbreak of eczema on her hands.  Again, she describes the outbreak as starting as a bunch of small blisters under the skin that are extremely itchy.  When they break through the skin, then she gets a thick callus with cracking and open wounds.  She currently is experiencing this on both hands, left worse than right with a deep crack over the palmar aspect between the third and fourth fingers on the left hand as well as raw skin in between the fingers.  She has been using gloves whenever she washes the dishes, changes her baby, or does anything where her hands will be exposed to water or chemicals.  She has been using topical clobetasol cream off and on for about the past year.  At times it will work and keep things at bay but she has noticed worsening lately.  She states that at this point, her hands are too painful to continue to put any topical products on.  She is willing to see a dermatologist to help with this issue given the severity of her symptoms.         Current medicines (including changes today)  Current Outpatient Prescriptions   Medication Sig Dispense Refill   • MethylPREDNISolone (MEDROL DOSEPAK) 4 MG Tablet Therapy Pack Take 1 Tab by mouth every day. 1 Kit 0   • FLUoxetine (PROZAC) 10 MG Cap Take 10 mg by mouth 2 times a day.     • clonazePAM (KLONOPIN) 0.5 MG Tab Take 0.5 mg by mouth every day.     • NS SOLN 60 mL with albuterol 2.5 mg/0.5 mL NEBU 5 mL 5 mg/hr by Nebulization route.     • albuterol 108 (90 Base) MCG/ACT Aero Soln inhalation aerosol Inhale 2 Puffs by mouth every 6 hours as needed for Shortness of Breath. 8.5 g 3     No current facility-administered medications for this visit.      She  has a past medical history of Anxiety; Depression; and Drug abuse, IV (HCC).    ROS   Denies chest pain, shortness of breath  As above in HPI     Objective:      Vitals:    04/17/19 1115   BP: 118/82   Pulse: 86   Resp: 16   Temp: 36.4 °C (97.6 °F)   SpO2: 95%     Body mass index is 31.29 kg/m².   Physical Exam:  Constitutional: Alert, no distress.  Skin: Warm, dry, good turgor, left palm with thickened skin between the third and fourth MCP region with deep crack and raw skin, skin sloughing and irritation of the fingers on both hands, numerous raw open areas between fingers on the left.  Eye: Equal, round and reactive, conjunctiva clear, lids normal.  Psych: Alert and oriented x3, normal affect and mood.      Assessment and Plan:   The following treatment plan was discussed    1. Eczema, unspecified type  I believe she is still suffering from severe dyshidrotic eczema.  At this point, the cracks in her hands are preventing her from using any topical steroid.  We discussed trying an oral steroid to see if we can get things to heal up a little quicker and then she can resume the topical steroid.  Given that she has not had good control of her symptoms, she is interested in seeing a dermatologist and I placed a referral today.  She is aware that this will be down in [place] and she was given information regarding transportation through [facility].  - MethylPREDNISolone (MEDROL DOSEPAK) 4 MG Tablet Therapy Pack; Take 1 Tab by mouth every day.  Dispense: 1 Kit; Refill: 0  - REFERRAL TO DERMATOLOGY  -Continue to protect hands with gloves when using any chemicals or washing dishes  -Avoid excessive handwashing  -Use gloves at night with topical clobetasol once the open wounds healed        Followup: Return if symptoms worsen or fail to improve.

## 2019-05-15 ENCOUNTER — HOSPITAL ENCOUNTER (EMERGENCY)
Facility: MEDICAL CENTER | Age: 24
End: 2019-05-15
Attending: EMERGENCY MEDICINE
Payer: MEDICAID

## 2019-05-15 VITALS
RESPIRATION RATE: 28 BRPM | SYSTOLIC BLOOD PRESSURE: 114 MMHG | HEART RATE: 91 BPM | HEIGHT: 59 IN | DIASTOLIC BLOOD PRESSURE: 61 MMHG | TEMPERATURE: 97.9 F | OXYGEN SATURATION: 99 % | WEIGHT: 149.47 LBS | BODY MASS INDEX: 30.13 KG/M2

## 2019-05-15 DIAGNOSIS — T78.40XA ALLERGIC REACTION, INITIAL ENCOUNTER: ICD-10-CM

## 2019-05-15 DIAGNOSIS — R22.0 FACIAL SWELLING: ICD-10-CM

## 2019-05-15 DIAGNOSIS — T78.3XXA ANGIOEDEMA OF LIPS, INITIAL ENCOUNTER: ICD-10-CM

## 2019-05-15 PROCEDURE — 94760 N-INVAS EAR/PLS OXIMETRY 1: CPT

## 2019-05-15 PROCEDURE — 700102 HCHG RX REV CODE 250 W/ 637 OVERRIDE(OP): Performed by: EMERGENCY MEDICINE

## 2019-05-15 PROCEDURE — 700105 HCHG RX REV CODE 258: Performed by: EMERGENCY MEDICINE

## 2019-05-15 PROCEDURE — 96376 TX/PRO/DX INJ SAME DRUG ADON: CPT

## 2019-05-15 PROCEDURE — 96374 THER/PROPH/DIAG INJ IV PUSH: CPT

## 2019-05-15 PROCEDURE — 99284 EMERGENCY DEPT VISIT MOD MDM: CPT

## 2019-05-15 PROCEDURE — A9270 NON-COVERED ITEM OR SERVICE: HCPCS | Performed by: EMERGENCY MEDICINE

## 2019-05-15 PROCEDURE — 700111 HCHG RX REV CODE 636 W/ 250 OVERRIDE (IP): Performed by: EMERGENCY MEDICINE

## 2019-05-15 PROCEDURE — 96375 TX/PRO/DX INJ NEW DRUG ADDON: CPT

## 2019-05-15 RX ORDER — SODIUM CHLORIDE 9 MG/ML
1000 INJECTION, SOLUTION INTRAVENOUS ONCE
Status: COMPLETED | OUTPATIENT
Start: 2019-05-15 | End: 2019-05-15

## 2019-05-15 RX ORDER — METHYLPREDNISOLONE SODIUM SUCCINATE 125 MG/2ML
125 INJECTION, POWDER, LYOPHILIZED, FOR SOLUTION INTRAMUSCULAR; INTRAVENOUS ONCE
Status: COMPLETED | OUTPATIENT
Start: 2019-05-15 | End: 2019-05-15

## 2019-05-15 RX ORDER — DIPHENHYDRAMINE HYDROCHLORIDE 50 MG/ML
25 INJECTION INTRAMUSCULAR; INTRAVENOUS ONCE
Status: COMPLETED | OUTPATIENT
Start: 2019-05-15 | End: 2019-05-15

## 2019-05-15 RX ORDER — ACETAMINOPHEN 325 MG/1
650 TABLET ORAL ONCE
Status: COMPLETED | OUTPATIENT
Start: 2019-05-15 | End: 2019-05-15

## 2019-05-15 RX ORDER — DIPHENHYDRAMINE HCL 25 MG
25 CAPSULE ORAL EVERY 6 HOURS PRN
Qty: 30 CAP | Refills: 0 | Status: SHIPPED | OUTPATIENT
Start: 2019-05-15 | End: 2019-05-29

## 2019-05-15 RX ORDER — CLINDAMYCIN HYDROCHLORIDE 300 MG/1
300 CAPSULE ORAL 3 TIMES DAILY
Qty: 28 CAP | Refills: 0 | Status: SHIPPED | OUTPATIENT
Start: 2019-05-15 | End: 2019-05-29

## 2019-05-15 RX ORDER — DIPHENHYDRAMINE HYDROCHLORIDE 50 MG/ML
50 INJECTION INTRAMUSCULAR; INTRAVENOUS ONCE
Status: COMPLETED | OUTPATIENT
Start: 2019-05-15 | End: 2019-05-15

## 2019-05-15 RX ORDER — METHYLPREDNISOLONE 4 MG/1
TABLET ORAL
Qty: 1 KIT | Refills: 0 | Status: SHIPPED | OUTPATIENT
Start: 2019-05-15 | End: 2019-05-29

## 2019-05-15 RX ORDER — FAMOTIDINE 20 MG/1
20 TABLET, FILM COATED ORAL ONCE
Status: COMPLETED | OUTPATIENT
Start: 2019-05-15 | End: 2019-05-15

## 2019-05-15 RX ORDER — SULFAMETHOXAZOLE AND TRIMETHOPRIM 800; 160 MG/1; MG/1
1 TABLET ORAL 2 TIMES DAILY
Status: SHIPPED | COMMUNITY
Start: 2019-05-14 | End: 2019-05-29

## 2019-05-15 RX ADMIN — ACETAMINOPHEN 650 MG: 325 TABLET, FILM COATED ORAL at 15:56

## 2019-05-15 RX ADMIN — SODIUM CHLORIDE 1000 ML: 9 INJECTION, SOLUTION INTRAVENOUS at 13:59

## 2019-05-15 RX ADMIN — DIPHENHYDRAMINE HYDROCHLORIDE 25 MG: 50 INJECTION INTRAMUSCULAR; INTRAVENOUS at 15:18

## 2019-05-15 RX ADMIN — METHYLPREDNISOLONE SODIUM SUCCINATE 125 MG: 125 INJECTION, POWDER, FOR SOLUTION INTRAMUSCULAR; INTRAVENOUS at 13:59

## 2019-05-15 RX ADMIN — DIPHENHYDRAMINE HYDROCHLORIDE 50 MG: 50 INJECTION INTRAMUSCULAR; INTRAVENOUS at 13:59

## 2019-05-15 RX ADMIN — FAMOTIDINE 20 MG: 20 TABLET, FILM COATED ORAL at 13:59

## 2019-05-15 NOTE — ED NOTES
Med Rec updated and complete per pt at bedside & pt home pharmacy  Allergies have been verified and updated  Pt Home Pharmacy:[facility]    Pt reports that she started a 7 day course of BACTRIM DS yesterday 05-        Pt reports that she finished a MEDROL DOSEPAK on 04-

## 2019-05-15 NOTE — ED NOTES
Discharged to home with instructions.  Prescriptions sent to pharmacy  Patient to follow up with her doctor and/or return here for worsening symptoms

## 2019-05-15 NOTE — ED PROVIDER NOTES
"ED Provider Note    CHIEF COMPLAINT  Chief Complaint   Patient presents with   • Allergic Reaction     Pt started taking Bactrim yesterday, developed itching t/o body last night and this am, as well as reported facial swelling. Pt reports throat \"itching and scratchiness\". Pt denies SOB.        HPI  Isa Mcclure is a 23 y.o. female who presents generalized itching, itching of the throat, swelling of the left face.  This started this morning.  Yesterday she was started on Bactrim to treat a cellulitis in the left forehead.  She has had no fevers chills or sweats.  She has no rash.  There is no shortness of breath at this time.    She has taken Bactrim in the past without problems.  No nausea no vomiting no diarrhea    REVIEW OF SYSTEMS  See HPI for further details. All other systems are negative.     PAST MEDICAL HISTORY   has a past medical history of Anxiety; Depression; and Drug abuse, IV ([facility]).    SOCIAL HISTORY  Social History     Social History Main Topics   • Smoking status: Former Smoker     Packs/day: 0.50     Years: 6.00     Types: Cigarettes     Quit date: 8/13/2015   • Smokeless tobacco: Never Used   • Alcohol use No   • Drug use: No      Comment: quit meth Aug 2015, used IV   • Sexual activity: Not Currently       SURGICAL HISTORY   has a past surgical history that includes cholecystectomy.    CURRENT MEDICATIONS  Home Medications     Reviewed by Bandar Olsen (Pharmacy Tech) on 05/15/19 at 1432  Med List Status: Complete   Medication Last Dose Status   albuterol 108 (90 Base) MCG/ACT Aero Soln inhalation aerosol <1week Active   clonazePAM (KLONOPIN) 0.5 MG Tab 5/14/2019 Active   FLUoxetine (PROZAC) 10 MG Cap 5/14/2019 Active   MethylPREDNISolone (MEDROL DOSEPAK) 4 MG Tablet Therapy Pack 4/22/2019 Active   sulfamethoxazole-trimethoprim (BACTRIM DS) 800-160 MG tablet 5/15/2019 Active                ALLERGIES  Allergies   Allergen Reactions   • Hydrocodone Itching and Nausea   • Bactrim Ds      " "hives       PHYSICAL EXAM  VITAL SIGNS: /61   Pulse 82   Temp 36.6 °C (97.9 °F) (Temporal)   Resp (!) 27   Ht 1.499 m (4' 11\")   Wt 67.8 kg (149 lb 7.6 oz)   LMP 05/15/2019 (Exact Date)   SpO2 97%   BMI 30.19 kg/m²   Pulse ox interpretation: pulse ox is normal.  Constitutional: Alert in no apparent distress.  HENT: No signs of trauma, Bilateral external ears normal, Nose normal.  There is some mild swelling of the left lower lip, no pharyngeal erythema or swelling.  Left forehead has a crusted lesion with mild surrounding erythema.  No drainage.  No abscess  Eyes: Pupils are equal and reactive, Conjunctiva normal, Non-icteric.   Neck: Normal range of motion, No tenderness, Supple, No stridor.   Lymphatic: No lymphadenopathy noted.   Cardiovascular: Regular rate and rhythm, no murmurs.   Thorax & Lungs: Normal breath sounds, No respiratory distress, No wheezing, No chest tenderness.   Abdomen: Bowel sounds normal, Soft, No tenderness, No masses, No pulsatile masses. No peritoneal signs.  Skin: Warm, Dry, No erythema, No rash.   Back: No bony tenderness, No CVA tenderness.   Extremities: Intact distal pulses, No edema, No tenderness, No cyanosis,  Negative Deandra's sign.   Musculoskeletal: Good range of motion in all major joints. No tenderness to palpation or major deformities noted.   Neurologic: Alert , Normal motor function, Normal sensory function, No focal deficits noted.   Psychiatric: Affect normal, Judgment normal, Mood normal.       MEDICAL DECISION MAKING  Patient presents with diffuse itching and angioedema of the lip after taking Bactrim that started yesterday.  She has no respiratory distress no signs of respiratory compromise.  She was given Solu-Medrol, Benadryl, and Pepcid and has been observed.  The itching did not improve with a 50 of Benadryl, additional 25 was given she states she still feels itchy.  On evaluation there is no urticarial rash.  There is no other treatment that is " effective for itching.  Her angioedema is improving, with that I believe the patient is stable for discharge home.  Clindamycin will be given for the small area of cellulitis on the left forehead.      DIAGNOSTIC STUDIES / PROCEDURES    EKG      LABS      RADIOLOGY        COURSE  Pertinent Labs & Imaging studies reviewed. (See chart for details)    The patient will not drink alcohol nor drive with prescribed medications. The patient will return for worsening symptoms and is stable at the time of discharge. The patient verbalizes understanding and will comply.    FINAL IMPRESSION  1. Allergic reaction, initial encounter    2. Facial swelling    3. Angioedema of lips, initial encounter               Electronically signed by: Frederick Cid, 5/15/2019 4:28 PM

## 2019-05-15 NOTE — ED TRIAGE NOTES
"Chief Complaint   Patient presents with   • Allergic Reaction     Pt started taking Bactrim yesterday, developed itching t/o body last night and this am, as well as reported facial swelling. Pt reports throat \"itching and scratchiness\". Pt denies SOB.      /61   Pulse 90   Temp 36.6 °C (97.9 °F) (Temporal)   Resp 18   Ht 1.499 m (4' 11\")   Wt 67.8 kg (149 lb 7.6 oz)   LMP 05/15/2019 (Exact Date)   SpO2 99%   BMI 30.19 kg/m²     Pt has not taken Benadryl   "

## 2019-05-19 ENCOUNTER — HOSPITAL ENCOUNTER (EMERGENCY)
Facility: MEDICAL CENTER | Age: 24
End: 2019-05-19
Payer: MEDICAID

## 2019-05-19 VITALS
RESPIRATION RATE: 20 BRPM | TEMPERATURE: 98.4 F | HEIGHT: 59 IN | DIASTOLIC BLOOD PRESSURE: 86 MMHG | HEART RATE: 77 BPM | BODY MASS INDEX: 29.33 KG/M2 | WEIGHT: 145.5 LBS | SYSTOLIC BLOOD PRESSURE: 127 MMHG

## 2019-05-19 PROCEDURE — 700111 HCHG RX REV CODE 636 W/ 250 OVERRIDE (IP)

## 2019-05-19 PROCEDURE — 302449 STATCHG TRIAGE ONLY (STATISTIC)

## 2019-05-19 RX ORDER — ONDANSETRON 4 MG/1
TABLET, ORALLY DISINTEGRATING ORAL
Status: DISCONTINUED
Start: 2019-05-19 | End: 2019-05-20 | Stop reason: HOSPADM

## 2019-05-19 RX ORDER — ONDANSETRON 4 MG/1
4 TABLET, ORALLY DISINTEGRATING ORAL ONCE
Status: COMPLETED | OUTPATIENT
Start: 2019-05-19 | End: 2019-05-19

## 2019-05-19 RX ADMIN — ONDANSETRON 4 MG: 4 TABLET, ORALLY DISINTEGRATING ORAL at 22:00

## 2019-05-23 ENCOUNTER — HOSPITAL ENCOUNTER (EMERGENCY)
Facility: MEDICAL CENTER | Age: 24
End: 2019-05-23
Attending: EMERGENCY MEDICINE
Payer: MEDICAID

## 2019-05-23 ENCOUNTER — APPOINTMENT (OUTPATIENT)
Dept: RADIOLOGY | Facility: MEDICAL CENTER | Age: 24
End: 2019-05-23
Attending: EMERGENCY MEDICINE
Payer: MEDICAID

## 2019-05-23 VITALS
OXYGEN SATURATION: 98 % | TEMPERATURE: 97.7 F | WEIGHT: 145.72 LBS | HEART RATE: 84 BPM | RESPIRATION RATE: 16 BRPM | BODY MASS INDEX: 29.38 KG/M2 | HEIGHT: 59 IN | SYSTOLIC BLOOD PRESSURE: 106 MMHG | DIASTOLIC BLOOD PRESSURE: 67 MMHG

## 2019-05-23 DIAGNOSIS — G44.209 TENSION-TYPE HEADACHE, NOT INTRACTABLE, UNSPECIFIED CHRONICITY PATTERN: ICD-10-CM

## 2019-05-23 DIAGNOSIS — R11.2 NAUSEA VOMITING AND DIARRHEA: ICD-10-CM

## 2019-05-23 DIAGNOSIS — R19.7 NAUSEA VOMITING AND DIARRHEA: ICD-10-CM

## 2019-05-23 LAB
ALBUMIN SERPL BCP-MCNC: 4.6 G/DL (ref 3.2–4.9)
ALBUMIN/GLOB SERPL: 1.3 G/DL
ALP SERPL-CCNC: 70 U/L (ref 30–99)
ALT SERPL-CCNC: 12 U/L (ref 2–50)
ANION GAP SERPL CALC-SCNC: 7 MMOL/L (ref 0–11.9)
APPEARANCE UR: CLEAR
AST SERPL-CCNC: 17 U/L (ref 12–45)
BASOPHILS # BLD AUTO: 0.1 % (ref 0–1.8)
BASOPHILS # BLD: 0.01 K/UL (ref 0–0.12)
BILIRUB SERPL-MCNC: 0.7 MG/DL (ref 0.1–1.5)
BUN SERPL-MCNC: 13 MG/DL (ref 8–22)
CALCIUM SERPL-MCNC: 9.4 MG/DL (ref 8.4–10.2)
CHLORIDE SERPL-SCNC: 106 MMOL/L (ref 96–112)
CO2 SERPL-SCNC: 23 MMOL/L (ref 20–33)
COLOR UR: YELLOW
COMMENT 1642: NORMAL
CREAT SERPL-MCNC: 0.76 MG/DL (ref 0.5–1.4)
EOSINOPHIL # BLD AUTO: 0.06 K/UL (ref 0–0.51)
EOSINOPHIL NFR BLD: 0.7 % (ref 0–6.9)
ERYTHROCYTE [DISTWIDTH] IN BLOOD BY AUTOMATED COUNT: 42.3 FL (ref 35.9–50)
GLOBULIN SER CALC-MCNC: 3.5 G/DL (ref 1.9–3.5)
GLUCOSE SERPL-MCNC: 91 MG/DL (ref 65–99)
GLUCOSE UR STRIP.AUTO-MCNC: NEGATIVE MG/DL
HCG SERPL QL: NEGATIVE
HCT VFR BLD AUTO: 39.3 % (ref 37–47)
HGB BLD-MCNC: 13.3 G/DL (ref 12–16)
IMM GRANULOCYTES # BLD AUTO: 0.03 K/UL (ref 0–0.11)
IMM GRANULOCYTES NFR BLD AUTO: 0.3 % (ref 0–0.9)
KETONES UR STRIP.AUTO-MCNC: 15 MG/DL
LEUKOCYTE ESTERASE UR QL STRIP.AUTO: NEGATIVE
LIPASE SERPL-CCNC: 28 U/L (ref 7–58)
LYMPHOCYTES # BLD AUTO: 2.61 K/UL (ref 1–4.8)
LYMPHOCYTES NFR BLD: 29.7 % (ref 22–41)
MCH RBC QN AUTO: 29.7 PG (ref 27–33)
MCHC RBC AUTO-ENTMCNC: 33.8 G/DL (ref 33.6–35)
MCV RBC AUTO: 87.7 FL (ref 81.4–97.8)
MONOCYTES # BLD AUTO: 0.49 K/UL (ref 0–0.85)
MONOCYTES NFR BLD AUTO: 5.6 % (ref 0–13.4)
NEUTROPHILS # BLD AUTO: 5.58 K/UL (ref 2–7.15)
NEUTROPHILS NFR BLD: 63.6 % (ref 44–72)
NITRITE UR QL STRIP.AUTO: NEGATIVE
NRBC # BLD AUTO: 0 K/UL
NRBC BLD-RTO: 0 /100 WBC
PH UR STRIP.AUTO: 8 [PH]
PLATELET # BLD AUTO: 377 K/UL (ref 164–446)
PMV BLD AUTO: 10.8 FL (ref 9–12.9)
POTASSIUM SERPL-SCNC: 3.8 MMOL/L (ref 3.6–5.5)
PROT SERPL-MCNC: 8.1 G/DL (ref 6–8.2)
PROT UR QL STRIP: NEGATIVE MG/DL
RBC # BLD AUTO: 4.48 M/UL (ref 4.2–5.4)
RBC UR QL AUTO: ABNORMAL
SODIUM SERPL-SCNC: 136 MMOL/L (ref 135–145)
SP GR UR STRIP.AUTO: 1.02
WBC # BLD AUTO: 8.8 K/UL (ref 4.8–10.8)

## 2019-05-23 PROCEDURE — 96374 THER/PROPH/DIAG INJ IV PUSH: CPT

## 2019-05-23 PROCEDURE — 83690 ASSAY OF LIPASE: CPT

## 2019-05-23 PROCEDURE — 96375 TX/PRO/DX INJ NEW DRUG ADDON: CPT

## 2019-05-23 PROCEDURE — 36415 COLL VENOUS BLD VENIPUNCTURE: CPT

## 2019-05-23 PROCEDURE — 700111 HCHG RX REV CODE 636 W/ 250 OVERRIDE (IP): Performed by: EMERGENCY MEDICINE

## 2019-05-23 PROCEDURE — 81002 URINALYSIS NONAUTO W/O SCOPE: CPT

## 2019-05-23 PROCEDURE — 700105 HCHG RX REV CODE 258: Performed by: EMERGENCY MEDICINE

## 2019-05-23 PROCEDURE — 70450 CT HEAD/BRAIN W/O DYE: CPT

## 2019-05-23 PROCEDURE — 99285 EMERGENCY DEPT VISIT HI MDM: CPT

## 2019-05-23 PROCEDURE — 85025 COMPLETE CBC W/AUTO DIFF WBC: CPT

## 2019-05-23 PROCEDURE — 80053 COMPREHEN METABOLIC PANEL: CPT

## 2019-05-23 PROCEDURE — 84703 CHORIONIC GONADOTROPIN ASSAY: CPT

## 2019-05-23 RX ORDER — KETOROLAC TROMETHAMINE 30 MG/ML
15 INJECTION, SOLUTION INTRAMUSCULAR; INTRAVENOUS ONCE
Status: COMPLETED | OUTPATIENT
Start: 2019-05-23 | End: 2019-05-23

## 2019-05-23 RX ORDER — ONDANSETRON 2 MG/ML
4 INJECTION INTRAMUSCULAR; INTRAVENOUS ONCE
Status: COMPLETED | OUTPATIENT
Start: 2019-05-23 | End: 2019-05-23

## 2019-05-23 RX ORDER — PROMETHAZINE HYDROCHLORIDE 25 MG/1
25 SUPPOSITORY RECTAL EVERY 6 HOURS PRN
Qty: 5 SUPPOSITORY | Refills: 0 | Status: SHIPPED | OUTPATIENT
Start: 2019-05-23 | End: 2019-05-29

## 2019-05-23 RX ORDER — PROMETHAZINE HYDROCHLORIDE 25 MG/1
25 TABLET ORAL EVERY 6 HOURS PRN
Qty: 15 TAB | Refills: 0 | Status: SHIPPED | OUTPATIENT
Start: 2019-05-23 | End: 2019-05-29

## 2019-05-23 RX ORDER — SODIUM CHLORIDE 9 MG/ML
1000 INJECTION, SOLUTION INTRAVENOUS ONCE
Status: COMPLETED | OUTPATIENT
Start: 2019-05-23 | End: 2019-05-23

## 2019-05-23 RX ORDER — DIPHENHYDRAMINE HYDROCHLORIDE 50 MG/ML
50 INJECTION INTRAMUSCULAR; INTRAVENOUS ONCE
Status: COMPLETED | OUTPATIENT
Start: 2019-05-23 | End: 2019-05-23

## 2019-05-23 RX ADMIN — DIPHENHYDRAMINE HYDROCHLORIDE 50 MG: 50 INJECTION INTRAMUSCULAR; INTRAVENOUS at 17:55

## 2019-05-23 RX ADMIN — SODIUM CHLORIDE 1000 ML: 9 INJECTION, SOLUTION INTRAVENOUS at 17:55

## 2019-05-23 RX ADMIN — ONDANSETRON 4 MG: 2 INJECTION INTRAMUSCULAR; INTRAVENOUS at 17:47

## 2019-05-23 RX ADMIN — KETOROLAC TROMETHAMINE 15 MG: 30 INJECTION, SOLUTION INTRAMUSCULAR; INTRAVENOUS at 17:56

## 2019-05-23 NOTE — ED NOTES
Med Rec complete per pt at bedside  Ok per Pt to discuss medications with visitor/s present  Allergies have been verified and updated  Pt Home Pharmacy:[facility]      Pt reports that she received BACTRIM on 05- and stopped taking it on 05- due to a reaction    Pt reports that she on took 2 doses of CLEOCIN that was prescribed on 05-    Pt reports that she only took 4-5 doses of a MEDROL DOSEPAK that she received on 05-

## 2019-05-24 NOTE — ED PROVIDER NOTES
ED Provider Note    CHIEF COMPLAINT  Chief Complaint   Patient presents with   • Abdominal Pain     x 2 wks Spasms of pain Persistent   • N/V     3-4/24 hrs   • Diarrhea     Liquid stool No blood 3-4 episodes/24 hrs   • Headache        HPI  Isa Mcclure is a 23 y.o. female who presents to the ED with complaints of abdominal cramps nausea vomiting diarrhea and headache.  Patient states that she has been having nausea vomiting diarrhea for about a week.  Describes abdominal cramps about 2 weeks.  The patient was seen in the emergency department about 2 weeks ago apparently had prescriptions for a facial abscess for antibiotics she apparently had an allergic reaction stop those antibiotics and was apparently started on another antibiotic.  Patient only took 2 days of the other antibiotic and apparently the infection in her face went away.  She did not take any of the steroids.  Since then she is been having intermittent and worsening headaches scribed and severe.  Over the past week however she is also been having vomiting and diarrhea.  Patient describes 4 bouts of diarrhea a day and about 16 bouts of vomiting.  Patient states she is unable to tolerate p.o. fluids is presents the ED for evaluation.  Upon arrival she describes a severe 8-10 over 10 type headache located throughout her whole head with nausea vomiting photophobia denies any chest pain shortness of breath describes abdominal cramps with vomiting and diarrhea as above.  Denies any overt fevers does describe chills denies any other symptoms.    REVIEW OF SYSTEMS  See HPI for further details. All other systems are negative.     PAST MEDICAL HISTORY  Past Medical History:   Diagnosis Date   • Anxiety    • Depression    • Drug abuse, IV (HCC)        FAMILY HISTORY  Family History   Problem Relation Age of Onset   • Hypertension Mother    • Diabetes Mother    • Psychiatry Mother    • Hypertension Father    • Psychiatry Brother      Patient's family history  "has been discussed and is been found to be noncontributory to his present illness  SOCIAL HISTORY  Social History     Social History   • Marital status: Single     Spouse name: N/A   • Number of children: N/A   • Years of education: N/A     Social History Main Topics   • Smoking status: Former Smoker     Packs/day: 0.50     Years: 6.00     Types: Cigarettes     Quit date: 8/13/2015   • Smokeless tobacco: Never Used   • Alcohol use No   • Drug use: No      Comment: quit meth Aug 2015, used IV   • Sexual activity: Not Currently     Other Topics Concern   • Not on file     Social History Narrative   • No narrative on file      Theresa Goss M.D.    SURGICAL HISTORY  Past Surgical History:   Procedure Laterality Date   • CHOLECYSTECTOMY         CURRENT MEDICATIONS  Home Medications     Reviewed by Bandar Olsen (Pharmacy Tech) on 05/23/19 at 1606  Med List Status: Complete   Medication Last Dose Status   albuterol 108 (90 Base) MCG/ACT Aero Soln inhalation aerosol <2weeks Active   clindamycin (CLEOCIN) 300 MG Cap 5/16/2019 Active   clonazePAM (KLONOPIN) 1 MG Tab 5/21/2019 Active   diphenhydrAMINE (BENADRYL) 25 MG capsule 5/18/2019 Active   FLUoxetine (PROZAC) 10 MG Cap 5/21/2019 Active   MethylPREDNISolone (MEDROL DOSEPAK) 4 MG Tablet Therapy Pack 5/19/2019 Active   sulfamethoxazole-trimethoprim (BACTRIM DS) 800-160 MG tablet 5/15/2019 Active                ALLERGIES  Allergies   Allergen Reactions   • Bactrim Ds Hives     hives   • Hydrocodone Itching and Nausea       PHYSICAL EXAM  VITAL SIGNS: /87   Pulse 98   Temp 36.5 °C (97.7 °F) (Temporal)   Resp 16   Ht 1.499 m (4' 11\")   Wt 66.1 kg (145 lb 11.6 oz)   LMP 05/15/2019 (Exact Date)   SpO2 95%   BMI 29.43 kg/m²    Pulse Oximetry was obtained. It showed a reading of Pulse Oximetry: 95 %.  I interpreted this as nonhypoxic.     Constitutional: Well developed, Well nourished, No acute distress, Non-toxic appearance.   HENT: Normocephalic, " Atraumatic, Bilateral external ears normal, bilateral tympanic membranes normal, Oropharynx dry mucous membranes, No oral exudates, Nose normal.   Eyes:  conjunctiva is normal, there are no signs of exudate.   Neck: Supple, no cervical lymphadenopathy, no meningeal signs..   Lymphatic: No lymphadenopathy noted.   Cardiovascular: Regular rate and rhythm without murmurs gallops or rubs.   Thorax & Lungs: Lungs are clear to auscultation bilaterally, there are no wheezes no rales. Chest wall is nontender.  Abdomen: Soft, nontender nondistended. Bowel sounds are present.   Skin: Warm, Dry, No erythema,   Back: No tenderness, No CVA tenderness.  Musculoskeletal: Good range of motion in all major joints. No tenderness to palpation or major deformities noted. Intact distal pulses, no clubbing, no cyanosis, no edema     Neurologic: Alert & oriented x 3, Normal motor function, Normal sensory function, No focal deficits noted.   Psychiatric: Affect normal, Judgment normal, Mood normal.     EKG  None    RADIOLOGY/PROCEDURES  CT-HEAD W/O   Final Result      No acute intracranial abnormality is identified.              Results for orders placed or performed during the hospital encounter of 05/23/19   CBC WITH DIFFERENTIAL   Result Value Ref Range    WBC 8.8 4.8 - 10.8 K/uL    RBC 4.48 4.20 - 5.40 M/uL    Hemoglobin 13.3 12.0 - 16.0 g/dL    Hematocrit 39.3 37.0 - 47.0 %    MCV 87.7 81.4 - 97.8 fL    MCH 29.7 27.0 - 33.0 pg    MCHC 33.8 33.6 - 35.0 g/dL    RDW 42.3 35.9 - 50.0 fL    Platelet Count 377 164 - 446 K/uL    MPV 10.8 9.0 - 12.9 fL    Neutrophils-Polys 63.60 44.00 - 72.00 %    Lymphocytes 29.70 22.00 - 41.00 %    Monocytes 5.60 0.00 - 13.40 %    Eosinophils 0.70 0.00 - 6.90 %    Basophils 0.10 0.00 - 1.80 %    Immature Granulocytes 0.30 0.00 - 0.90 %    Nucleated RBC 0.00 /100 WBC    Neutrophils (Absolute) 5.58 2.00 - 7.15 K/uL    Lymphs (Absolute) 2.61 1.00 - 4.80 K/uL    Monos (Absolute) 0.49 0.00 - 0.85 K/uL    Eos  (Absolute) 0.06 0.00 - 0.51 K/uL    Baso (Absolute) 0.01 0.00 - 0.12 K/uL    Immature Granulocytes (abs) 0.03 0.00 - 0.11 K/uL    NRBC (Absolute) 0.00 K/uL   COMP METABOLIC PANEL   Result Value Ref Range    Sodium 136 135 - 145 mmol/L    Potassium 3.8 3.6 - 5.5 mmol/L    Chloride 106 96 - 112 mmol/L    Co2 23 20 - 33 mmol/L    Anion Gap 7.0 0.0 - 11.9    Glucose 91 65 - 99 mg/dL    Bun 13 8 - 22 mg/dL    Creatinine 0.76 0.50 - 1.40 mg/dL    Calcium 9.4 8.4 - 10.2 mg/dL    AST(SGOT) 17 12 - 45 U/L    ALT(SGPT) 12 2 - 50 U/L    Alkaline Phosphatase 70 30 - 99 U/L    Total Bilirubin 0.7 0.1 - 1.5 mg/dL    Albumin 4.6 3.2 - 4.9 g/dL    Total Protein 8.1 6.0 - 8.2 g/dL    Globulin 3.5 1.9 - 3.5 g/dL    A-G Ratio 1.3 g/dL   LIPASE   Result Value Ref Range    Lipase 28 7 - 58 U/L   HCG QUAL SERUM   Result Value Ref Range    Beta-Hcg Qualitative Serum Negative Negative   ESTIMATED GFR   Result Value Ref Range    GFR If African American >60 >60 mL/min/1.73 m 2    GFR If Non African American >60 >60 mL/min/1.73 m 2   DIFFERENTIAL COMMENT   Result Value Ref Range    Comments-Diff see below    POC UA   Result Value Ref Range    POC Color Yellow     POC Appearance Clear     POC Glucose Negative Negative mg/dL    POC Ketones 15 (A) Negative mg/dL    POC Specific Gravity 1.020 1.005 - 1.030    POC Blood Trace-intact (A) Negative    POC Urine PH 8.0 5.0 - 8.0    POC Protein Negative Negative mg/dL    POC Nitrites Negative Negative    POC Leukocyte Esterase Negative Negative         COURSE & MEDICAL DECISION MAKING  Pertinent Labs & Imaging studies reviewed. (See chart for details)  Patient presents ED for evaluation.  The patient was given a liter bolus of normal saline, Toradol, Benadryl and Zofran for headache.  Laboratory studies were drawn there is no signs of elevated white blood cell count LFTs are normal.  CT scan of the head was obtained because of the severe headache.  All these appear normal at this time.  Upon  reevaluation the patient was feeling slightly improved with the fluids.  She was given fluids because she has been unable to tolerate p.o. fluids and because of this history of vomiting diarrhea and signs of dehydration.  She is able to tolerate p.o. fluids here I do feel is most likely a viral gastroenteritis.  From the standpoint of what she was on antibiotics before 2 weeks ago she has no signs of cellulitis to her face and she only got 2 doses of the antibiotics.  At this point I do not feel that the medication reaction because the vomit diarrhea is most likely unrelated.  At this point I recommended to continue pushing fluids I will give her a prescription for Phenergan for nausea control.  The patient is to follow-up with her primary care physician in 1 week if she has any continued symptoms return as needed.    FINAL IMPRESSION  1. Nausea vomiting and diarrhea    2. Tension-type headache, not intractable, unspecified chronicity pattern           The patient will return for new or worsening symptoms and is stable at the time of discharge.    The patient is referred to a primary physician for blood pressure management, diabetic screening, and for all other preventative health concerns.        DISPOSITION:  Patient will be discharged home in stable condition.    FOLLOW UP:  Theresa Goss M.D.  [address] [place] [id number]  [phone]    Schedule an appointment as soon as possible for a visit in 1 week  For re-check, Return if any symptoms worsen      OUTPATIENT MEDICATIONS:  New Prescriptions    PROMETHAZINE (PHENERGAN) 25 MG SUPPOS    Insert 1 Suppository in rectum every 6 hours as needed for Nausea/Vomiting.    PROMETHAZINE (PHENERGAN) 25 MG TAB    Take 1 Tab by mouth every 6 hours as needed for Nausea/Vomiting.               Electronically signed by: Tomer Live, 5/23/2019 5:51 PM

## 2019-05-29 ENCOUNTER — OFFICE VISIT (OUTPATIENT)
Dept: MEDICAL GROUP | Facility: MEDICAL CENTER | Age: 24
End: 2019-05-29
Attending: INTERNAL MEDICINE
Payer: COMMERCIAL

## 2019-05-29 VITALS
WEIGHT: 148 LBS | BODY MASS INDEX: 29.84 KG/M2 | DIASTOLIC BLOOD PRESSURE: 88 MMHG | HEART RATE: 82 BPM | HEIGHT: 59 IN | OXYGEN SATURATION: 100 % | TEMPERATURE: 97.9 F | SYSTOLIC BLOOD PRESSURE: 120 MMHG | RESPIRATION RATE: 16 BRPM

## 2019-05-29 DIAGNOSIS — G43.009 MIGRAINE WITHOUT AURA AND WITHOUT STATUS MIGRAINOSUS, NOT INTRACTABLE: ICD-10-CM

## 2019-05-29 PROBLEM — F99 PSYCHIATRIC DISORDER: Status: RESOLVED | Noted: 2018-11-06 | Resolved: 2019-05-29

## 2019-05-29 PROCEDURE — 99214 OFFICE O/P EST MOD 30 MIN: CPT | Performed by: INTERNAL MEDICINE

## 2019-05-29 PROCEDURE — 99212 OFFICE O/P EST SF 10 MIN: CPT | Performed by: INTERNAL MEDICINE

## 2019-05-29 RX ORDER — SUMATRIPTAN 100 MG/1
TABLET, FILM COATED ORAL
Qty: 9 TAB | Refills: 2 | Status: SHIPPED | OUTPATIENT
Start: 2019-05-29 | End: 2019-07-30

## 2019-05-29 RX ORDER — ONDANSETRON 8 MG/1
8 TABLET, ORALLY DISINTEGRATING ORAL EVERY 8 HOURS PRN
Qty: 15 TAB | Refills: 3 | Status: SHIPPED | OUTPATIENT
Start: 2019-05-29 | End: 2019-10-29

## 2019-05-29 ASSESSMENT — PAIN SCALES - GENERAL: PAINLEVEL: 4=SLIGHT-MODERATE PAIN

## 2019-05-29 NOTE — ASSESSMENT & PLAN NOTE
Patient reports that approximately 3 weeks ago, she had acute onset of a very severe headache.  She has never had a migraine headache before.  She describes it is bitemporal, pounding, associated with photophobia, blurred vision, nausea, and vomiting.  She did not have any aura-like symptoms.  Her symptoms persisted for several days off and on varying in severity.  She ended up going to the emergency room for evaluation.  She did have a CT head while there which came back unremarkable.  She was discharged with Phenergan but states that this medication makes her very sleepy and she prefers the 8 mg Zofran which works well for her.  In the past, she has treated her normal headaches with ibuprofen and Tylenol however she reports this was ineffective for these types of headaches.  She has had varying severity of this similar headache type over the past 3 weeks off and on.  She does report not sleeping well but she feels this is related to the headaches.  She reports increased stress that she recently got .  She denies any new foods.  She is not experiencing her menstrual cycle.  Both her mother and her sister have migraine headaches and started getting them when they were in their 20s.  She has never been on any migraine specific medications in the past.

## 2019-05-29 NOTE — PROGRESS NOTES
Subjective:   Isa Mcclure is a 23 y.o. female here today for headache with nausea/vomiting    Migraine without aura and without status migrainosus, not intractable  Patient reports that approximately 3 weeks ago, she had acute onset of a very severe headache.  She has never had a migraine headache before.  She describes it is bitemporal, pounding, associated with photophobia, blurred vision, nausea, and vomiting.  She did not have any aura-like symptoms.  Her symptoms persisted for several days off and on varying in severity.  She ended up going to the emergency room for evaluation.  She did have a CT head while there which came back unremarkable.  She was discharged with Phenergan but states that this medication makes her very sleepy and she prefers the 8 mg Zofran which works well for her.  In the past, she has treated her normal headaches with ibuprofen and Tylenol however she reports this was ineffective for these types of headaches.  She has had varying severity of this similar headache type over the past 3 weeks off and on.  She does report not sleeping well but she feels this is related to the headaches.  She reports increased stress that she recently got .  She denies any new foods.  She is not experiencing her menstrual cycle.  Both her mother and her sister have migraine headaches and started getting them when they were in their 20s.  She has never been on any migraine specific medications in the past.         Current medicines (including changes today)  Current Outpatient Prescriptions   Medication Sig Dispense Refill   • sumatriptan (IMITREX) 100 MG tablet Take 1/2 to 1 tab daily as needed for migraine headache 9 Tab 2   • ondansetron (ZOFRAN ODT) 8 MG TABLET DISPERSIBLE Take 1 Tab by mouth every 8 hours as needed for Nausea. 15 Tab 3   • FLUoxetine (PROZAC) 10 MG Cap Take 10 mg by mouth 2 times a day.     • albuterol 108 (90 Base) MCG/ACT Aero Soln inhalation aerosol Inhale 2 Puffs by mouth  every 6 hours as needed for Shortness of Breath. 8.5 g 3   • clonazePAM (KLONOPIN) 1 MG Tab Take 1 mg by mouth 3 times a day.       No current facility-administered medications for this visit.      She  has a past medical history of Anxiety; Depression; Drug abuse, IV (HCC); and Migraine.    ROS   Denies chest pain, shortness of breath  As above in HPI     Objective:     Vitals:    05/29/19 0737   BP: 120/88   Pulse: 82   Resp: 16   Temp: 36.6 °C (97.9 °F)   SpO2: 100%     Body mass index is 29.88 kg/m².   Physical Exam:  Constitutional: Alert, no distress.  Skin: Warm, dry, good turgor, no rashes in visible areas.  Eye: Equal, round and reactive, conjunctiva clear, lids normal.  Psych: Alert and oriented x3, normal affect and mood.  Neuro: no focal sensory or motor deficits    Results and Imaging:   CT head (5/23/19):  FINDINGS:  No acute intracranial hemorrhage is seen.    There is no midline shift.    Ventricles are normal in size and configuration.    Gray white junction differentiation is distinct.    Visualized paranasal sinuses and mastoid air cells are clear.    No acute calvarium abnormality is noted.   Impression       No acute intracranial abnormality is identified.         Assessment and Plan:   The following treatment plan was discussed    1. Migraine without aura and without status migrainosus, not intractable  New onset but normal CT head done recently.  Her symptoms are classic for migraine headaches which also run in the females in her family.  We discussed starting off with a migraine specific abortive therapy.  Prescription for Imitrex was sent.  We discussed that if this does not control her symptoms, she should contact me by my chart and we could try a different abortive therapy such as Fioricet.  We also discussed that if her symptoms start to become more common occurring every week or several times per week, we could talk about getting her on a daily prophylactic medication.  I have given her  some Zofran to take instead of the Phenergan which makes her sleepy.  We also discussed starting a migraine journal to try to identify any triggers.  - sumatriptan (IMITREX) 100 MG tablet; Take 1/2 to 1 tab daily as needed for migraine headache  Dispense: 9 Tab; Refill: 2  - ondansetron (ZOFRAN ODT) 8 MG TABLET DISPERSIBLE; Take 1 Tab by mouth every 8 hours as needed for Nausea.  Dispense: 15 Tab; Refill: 3        Followup: Return if symptoms worsen or fail to improve.

## 2019-07-20 ENCOUNTER — HOSPITAL ENCOUNTER (OUTPATIENT)
Facility: MEDICAL CENTER | Age: 24
End: 2019-07-20
Attending: FAMILY MEDICINE
Payer: COMMERCIAL

## 2019-07-20 ENCOUNTER — OFFICE VISIT (OUTPATIENT)
Dept: URGENT CARE | Facility: PHYSICIAN GROUP | Age: 24
End: 2019-07-20
Payer: COMMERCIAL

## 2019-07-20 VITALS
HEART RATE: 78 BPM | WEIGHT: 148 LBS | OXYGEN SATURATION: 97 % | RESPIRATION RATE: 18 BRPM | BODY MASS INDEX: 29.84 KG/M2 | SYSTOLIC BLOOD PRESSURE: 120 MMHG | TEMPERATURE: 98.7 F | DIASTOLIC BLOOD PRESSURE: 72 MMHG | HEIGHT: 59 IN

## 2019-07-20 DIAGNOSIS — R82.90 ABNORMAL URINALYSIS: ICD-10-CM

## 2019-07-20 DIAGNOSIS — N76.0 ACUTE VAGINITIS: ICD-10-CM

## 2019-07-20 DIAGNOSIS — L29.9 ITCHING: ICD-10-CM

## 2019-07-20 LAB
APPEARANCE UR: CLEAR
BILIRUB UR STRIP-MCNC: NEGATIVE MG/DL
COLOR UR AUTO: YELLOW
GLUCOSE UR STRIP.AUTO-MCNC: NEGATIVE MG/DL
KETONES UR STRIP.AUTO-MCNC: NEGATIVE MG/DL
LEUKOCYTE ESTERASE UR QL STRIP.AUTO: NORMAL
NITRITE UR QL STRIP.AUTO: NEGATIVE
PH UR STRIP.AUTO: 5 [PH] (ref 5–8)
PROT UR QL STRIP: NEGATIVE MG/DL
RBC UR QL AUTO: NORMAL
SP GR UR STRIP.AUTO: 1.03
UROBILINOGEN UR STRIP-MCNC: 0.2 MG/DL

## 2019-07-20 PROCEDURE — 87660 TRICHOMONAS VAGIN DIR PROBE: CPT

## 2019-07-20 PROCEDURE — 81002 URINALYSIS NONAUTO W/O SCOPE: CPT | Performed by: FAMILY MEDICINE

## 2019-07-20 PROCEDURE — 87480 CANDIDA DNA DIR PROBE: CPT

## 2019-07-20 PROCEDURE — 87510 GARDNER VAG DNA DIR PROBE: CPT

## 2019-07-20 PROCEDURE — 99203 OFFICE O/P NEW LOW 30 MIN: CPT | Performed by: FAMILY MEDICINE

## 2019-07-20 PROCEDURE — 87086 URINE CULTURE/COLONY COUNT: CPT

## 2019-07-20 ASSESSMENT — ENCOUNTER SYMPTOMS
FLANK PAIN: 0
SORE THROAT: 0
VOMITING: 0
EYE REDNESS: 0
EYE DISCHARGE: 0
FEVER: 0
NAUSEA: 0
WEIGHT LOSS: 0

## 2019-07-20 NOTE — PROGRESS NOTES
"Subjective:      Isa Mcclure is a 24 y.o. female who presents with Itching (started a few days ago. patient is having itching and yellow discharge. )            2 days vaginal discharge and itching. +swelling. No rash or vesicles. No change in odor. PMH BV. No concern for STI. Urinary urgency and frequency due to vaginal sx's/does not feel like bladder infection to patient. No hematuria. No OTC medications. No other aggravating or alleviating factors.          Review of Systems   Constitutional: Negative for fever, malaise/fatigue and weight loss.   HENT: Negative for sore throat.    Eyes: Negative for discharge and redness.   Gastrointestinal: Negative for nausea and vomiting.   Genitourinary: Negative for flank pain, frequency, hematuria and urgency.     .  Medications, Allergies, and current problem list reviewed today in Epic       Objective:     /72 (BP Location: Left arm, Patient Position: Sitting, BP Cuff Size: Adult)   Pulse 78   Temp 37.1 °C (98.7 °F) (Temporal)   Resp 18   Ht 1.499 m (4' 11\")   Wt 67.1 kg (148 lb)   SpO2 97%   BMI 29.89 kg/m²      Physical Exam   Constitutional: She is oriented to person, place, and time. She appears well-developed and well-nourished. No distress.   HENT:   Head: Normocephalic and atraumatic.   Mouth/Throat: Oropharynx is clear and moist.   Eyes: Conjunctivae are normal.   Cardiovascular: Normal rate, regular rhythm and normal heart sounds.    Pulmonary/Chest: Effort normal and breath sounds normal.   Genitourinary:   Genitourinary Comments: Pelvic exam deferred per patient request    Suprapubic tenderness, no cvat     Neurological: She is alert and oriented to person, place, and time.   Skin: Skin is warm and dry.               Assessment/Plan:   UA reviewed    1. Acute vaginitis  VAGINAL PATHOGENS DNA PANEL   2. Abnormal urinalysis  URINE CULTURE(NEW)       Differential diagnosis, natural history, supportive care, and indications for immediate follow-up " discussed at length.     Will f/u lab studies

## 2019-07-21 ENCOUNTER — HOSPITAL ENCOUNTER (EMERGENCY)
Facility: MEDICAL CENTER | Age: 24
End: 2019-07-21
Attending: EMERGENCY MEDICINE
Payer: COMMERCIAL

## 2019-07-21 VITALS
TEMPERATURE: 98.6 F | RESPIRATION RATE: 16 BRPM | WEIGHT: 152.78 LBS | SYSTOLIC BLOOD PRESSURE: 109 MMHG | BODY MASS INDEX: 30.8 KG/M2 | DIASTOLIC BLOOD PRESSURE: 75 MMHG | OXYGEN SATURATION: 100 % | HEIGHT: 59 IN | HEART RATE: 70 BPM

## 2019-07-21 DIAGNOSIS — B37.31 CANDIDAL VULVOVAGINITIS: ICD-10-CM

## 2019-07-21 DIAGNOSIS — N39.0 ACUTE UTI: ICD-10-CM

## 2019-07-21 LAB
ALBUMIN SERPL BCP-MCNC: 4 G/DL (ref 3.2–4.9)
ALBUMIN/GLOB SERPL: 1.1 G/DL
ALP SERPL-CCNC: 61 U/L (ref 30–99)
ALT SERPL-CCNC: 12 U/L (ref 2–50)
ANION GAP SERPL CALC-SCNC: 8 MMOL/L (ref 0–11.9)
APPEARANCE UR: CLEAR
AST SERPL-CCNC: 18 U/L (ref 12–45)
BACTERIA #/AREA URNS HPF: ABNORMAL /HPF
BASOPHILS # BLD AUTO: 0.2 % (ref 0–1.8)
BASOPHILS # BLD: 0.02 K/UL (ref 0–0.12)
BILIRUB SERPL-MCNC: 0.5 MG/DL (ref 0.1–1.5)
BILIRUB UR QL STRIP.AUTO: NEGATIVE
BUN SERPL-MCNC: 9 MG/DL (ref 8–22)
CALCIUM SERPL-MCNC: 9.1 MG/DL (ref 8.4–10.2)
CANDIDA DNA VAG QL PROBE+SIG AMP: POSITIVE
CHLORIDE SERPL-SCNC: 106 MMOL/L (ref 96–112)
CO2 SERPL-SCNC: 24 MMOL/L (ref 20–33)
COLOR UR: YELLOW
CREAT SERPL-MCNC: 0.66 MG/DL (ref 0.5–1.4)
EOSINOPHIL # BLD AUTO: 0.09 K/UL (ref 0–0.51)
EOSINOPHIL NFR BLD: 0.9 % (ref 0–6.9)
EPI CELLS #/AREA URNS HPF: ABNORMAL /HPF
ERYTHROCYTE [DISTWIDTH] IN BLOOD BY AUTOMATED COUNT: 42.3 FL (ref 35.9–50)
G VAGINALIS DNA VAG QL PROBE+SIG AMP: NEGATIVE
GLOBULIN SER CALC-MCNC: 3.6 G/DL (ref 1.9–3.5)
GLUCOSE SERPL-MCNC: 79 MG/DL (ref 65–99)
GLUCOSE UR STRIP.AUTO-MCNC: NEGATIVE MG/DL
HCG SERPL QL: NEGATIVE
HCT VFR BLD AUTO: 40.3 % (ref 37–47)
HGB BLD-MCNC: 13.4 G/DL (ref 12–16)
IMM GRANULOCYTES # BLD AUTO: 0.03 K/UL (ref 0–0.11)
IMM GRANULOCYTES NFR BLD AUTO: 0.3 % (ref 0–0.9)
KETONES UR STRIP.AUTO-MCNC: NEGATIVE MG/DL
LACTATE BLD-SCNC: 0.9 MMOL/L (ref 0.5–2)
LEUKOCYTE ESTERASE UR QL STRIP.AUTO: ABNORMAL
LIPASE SERPL-CCNC: 34 U/L (ref 7–58)
LYMPHOCYTES # BLD AUTO: 3.01 K/UL (ref 1–4.8)
LYMPHOCYTES NFR BLD: 29.5 % (ref 22–41)
MCH RBC QN AUTO: 29.6 PG (ref 27–33)
MCHC RBC AUTO-ENTMCNC: 33.3 G/DL (ref 33.6–35)
MCV RBC AUTO: 89 FL (ref 81.4–97.8)
MICRO URNS: ABNORMAL
MONOCYTES # BLD AUTO: 0.66 K/UL (ref 0–0.85)
MONOCYTES NFR BLD AUTO: 6.5 % (ref 0–13.4)
MUCOUS THREADS #/AREA URNS HPF: ABNORMAL /HPF
NEUTROPHILS # BLD AUTO: 6.4 K/UL (ref 2–7.15)
NEUTROPHILS NFR BLD: 62.6 % (ref 44–72)
NITRITE UR QL STRIP.AUTO: NEGATIVE
NRBC # BLD AUTO: 0 K/UL
NRBC BLD-RTO: 0 /100 WBC
PH UR STRIP.AUTO: 6.5 [PH]
PLATELET # BLD AUTO: 318 K/UL (ref 164–446)
PMV BLD AUTO: 11 FL (ref 9–12.9)
POTASSIUM SERPL-SCNC: 3.3 MMOL/L (ref 3.6–5.5)
PROT SERPL-MCNC: 7.6 G/DL (ref 6–8.2)
PROT UR QL STRIP: NEGATIVE MG/DL
RBC # BLD AUTO: 4.53 M/UL (ref 4.2–5.4)
RBC # URNS HPF: ABNORMAL /HPF
RBC UR QL AUTO: NEGATIVE
SODIUM SERPL-SCNC: 138 MMOL/L (ref 135–145)
SP GR UR STRIP.AUTO: 1.01
T VAGINALIS DNA VAG QL PROBE+SIG AMP: NEGATIVE
WBC # BLD AUTO: 10.2 K/UL (ref 4.8–10.8)
WBC #/AREA URNS HPF: ABNORMAL /HPF

## 2019-07-21 PROCEDURE — 85025 COMPLETE CBC W/AUTO DIFF WBC: CPT

## 2019-07-21 PROCEDURE — 700111 HCHG RX REV CODE 636 W/ 250 OVERRIDE (IP): Performed by: EMERGENCY MEDICINE

## 2019-07-21 PROCEDURE — 80053 COMPREHEN METABOLIC PANEL: CPT

## 2019-07-21 PROCEDURE — 96374 THER/PROPH/DIAG INJ IV PUSH: CPT

## 2019-07-21 PROCEDURE — 83690 ASSAY OF LIPASE: CPT

## 2019-07-21 PROCEDURE — 700105 HCHG RX REV CODE 258: Performed by: EMERGENCY MEDICINE

## 2019-07-21 PROCEDURE — 83605 ASSAY OF LACTIC ACID: CPT

## 2019-07-21 PROCEDURE — 84703 CHORIONIC GONADOTROPIN ASSAY: CPT

## 2019-07-21 PROCEDURE — 81001 URINALYSIS AUTO W/SCOPE: CPT

## 2019-07-21 PROCEDURE — 99285 EMERGENCY DEPT VISIT HI MDM: CPT

## 2019-07-21 RX ORDER — SODIUM CHLORIDE 9 MG/ML
1000 INJECTION, SOLUTION INTRAVENOUS ONCE
Status: COMPLETED | OUTPATIENT
Start: 2019-07-21 | End: 2019-07-21

## 2019-07-21 RX ORDER — KETOROLAC TROMETHAMINE 30 MG/ML
15 INJECTION, SOLUTION INTRAMUSCULAR; INTRAVENOUS ONCE
Status: COMPLETED | OUTPATIENT
Start: 2019-07-21 | End: 2019-07-21

## 2019-07-21 RX ORDER — FLUCONAZOLE 150 MG/1
150 TABLET ORAL DAILY
Qty: 2 TAB | Refills: 0 | Status: SHIPPED | OUTPATIENT
Start: 2019-07-21 | End: 2019-07-30

## 2019-07-21 RX ORDER — FLUCONAZOLE 150 MG/1
150 TABLET ORAL DAILY
Qty: 2 TAB | Refills: 0 | Status: SHIPPED | OUTPATIENT
Start: 2019-07-21 | End: 2019-07-21

## 2019-07-21 RX ORDER — NITROFURANTOIN 25; 75 MG/1; MG/1
100 CAPSULE ORAL 2 TIMES DAILY
Qty: 10 CAP | Refills: 0 | Status: SHIPPED | OUTPATIENT
Start: 2019-07-21 | End: 2019-07-26

## 2019-07-21 RX ADMIN — SODIUM CHLORIDE 1000 ML: 9 INJECTION, SOLUTION INTRAVENOUS at 17:13

## 2019-07-21 RX ADMIN — KETOROLAC TROMETHAMINE 15 MG: 30 INJECTION, SOLUTION INTRAMUSCULAR at 17:13

## 2019-07-21 ASSESSMENT — LIFESTYLE VARIABLES: DO YOU DRINK ALCOHOL: NO

## 2019-07-21 NOTE — ED PROVIDER NOTES
"ED Provider Note    CHIEF COMPLAINT  Chief Complaint   Patient presents with   • Flank Pain     both sides  feel like a \"Kidney infection\"   has Hx of same this pass year    started yeaterday    • Difficulty Urinating     having hard time voiding  started yesterday    • Chills     started yesterday    • Nausea   • Headache       HPI  Isa Mcclure is a 24 y.o. female who presents to the Emergency Department with complaints of vaginal itching and swelling in vaginal area.   Followed by pain with urinating, she started having back pain last night, no vomiting, no fevers, no concern for STI.         REVIEW OF SYSTEMS  Positive for knee pain, Negative for vomiting, fever.  As above all other systems are negative.    PAST MEDICAL HISTORY   has a past medical history of Anxiety; Depression; Drug abuse, IV (HCC); and Migraine.    FAMILY HISTORY  Family History   Problem Relation Age of Onset   • Hypertension Mother    • Diabetes Mother    • Psychiatry Mother    • Hypertension Father    • Psychiatry Brother         SOCIAL HISTORY  Social History     Social History Main Topics   • Smoking status: Former Smoker     Packs/day: 0.50     Years: 6.00     Types: Cigarettes     Quit date: 8/13/2015   • Smokeless tobacco: Never Used   • Alcohol use No   • Drug use: No      Comment: quit meth Aug 2015, used IV   • Sexual activity: Not Currently       SURGICAL HISTORY   has a past surgical history that includes cholecystectomy.    CURRENT MEDICATIONS  Reviewed.  See Encounter Summary.      ALLERGIES  Allergies   Allergen Reactions   • Bactrim Ds Hives     hives   • Hydrocodone Itching and Nausea       PHYSICAL EXAM  VITAL SIGNS: /75   Pulse 70   Temp 37 °C (98.6 °F) (Temporal)   Resp 16   Ht 1.499 m (4' 11\")   Wt 69.3 kg (152 lb 12.5 oz)   LMP 06/17/2019   SpO2 100%   BMI 30.86 kg/m²   Constitutional:  Alert, able to answer questions  HENT: Nose is normal in appearance, external ears are normal,  moist mucous " membranes  Eyes: Anicteric,  pupils are equal round and reactive, there is no conjunctival drainage or pallor   Neck: The trachea is midline, there is no obvious mass or meningeal signs  Cardiovascular: Good perfusion,  regular rate and rhythm without murmurs gallops or rubs  Thorax & Lungs: Respiratory rate and effort are normal. There is normal chest excursion with respiration.  No wheezes rhonchi or rales noted.  Abdomen: Abdomen is normal in appearance, no gross peritoneal signs  normal bowel sounds, no pain with cough  :   No CVA tenderness to palpation  Musculoskeletal: No deformities noted in all 4 extremities.   Skin: Visualized skin is warm without rash.  Neurologic:  Cranial nerves II through XII are intact there is no focal abnormality noted.  Psychiatric: Normal mood and mentation    RADIOLOGY/PROCEDURES  Imaging Studies:    No orders to display         Pertinent Labs   Results for orders placed or performed during the hospital encounter of 07/21/19   CBC WITH DIFFERENTIAL   Result Value Ref Range    WBC 10.2 4.8 - 10.8 K/uL    RBC 4.53 4.20 - 5.40 M/uL    Hemoglobin 13.4 12.0 - 16.0 g/dL    Hematocrit 40.3 37.0 - 47.0 %    MCV 89.0 81.4 - 97.8 fL    MCH 29.6 27.0 - 33.0 pg    MCHC 33.3 (L) 33.6 - 35.0 g/dL    RDW 42.3 35.9 - 50.0 fL    Platelet Count 318 164 - 446 K/uL    MPV 11.0 9.0 - 12.9 fL    Neutrophils-Polys 62.60 44.00 - 72.00 %    Lymphocytes 29.50 22.00 - 41.00 %    Monocytes 6.50 0.00 - 13.40 %    Eosinophils 0.90 0.00 - 6.90 %    Basophils 0.20 0.00 - 1.80 %    Immature Granulocytes 0.30 0.00 - 0.90 %    Nucleated RBC 0.00 /100 WBC    Neutrophils (Absolute) 6.40 2.00 - 7.15 K/uL    Lymphs (Absolute) 3.01 1.00 - 4.80 K/uL    Monos (Absolute) 0.66 0.00 - 0.85 K/uL    Eos (Absolute) 0.09 0.00 - 0.51 K/uL    Baso (Absolute) 0.02 0.00 - 0.12 K/uL    Immature Granulocytes (abs) 0.03 0.00 - 0.11 K/uL    NRBC (Absolute) 0.00 K/uL   COMP METABOLIC PANEL   Result Value Ref Range    Sodium 138 135 -  145 mmol/L    Potassium 3.3 (L) 3.6 - 5.5 mmol/L    Chloride 106 96 - 112 mmol/L    Co2 24 20 - 33 mmol/L    Anion Gap 8.0 0.0 - 11.9    Glucose 79 65 - 99 mg/dL    Bun 9 8 - 22 mg/dL    Creatinine 0.66 0.50 - 1.40 mg/dL    Calcium 9.1 8.4 - 10.2 mg/dL    AST(SGOT) 18 12 - 45 U/L    ALT(SGPT) 12 2 - 50 U/L    Alkaline Phosphatase 61 30 - 99 U/L    Total Bilirubin 0.5 0.1 - 1.5 mg/dL    Albumin 4.0 3.2 - 4.9 g/dL    Total Protein 7.6 6.0 - 8.2 g/dL    Globulin 3.6 (H) 1.9 - 3.5 g/dL    A-G Ratio 1.1 g/dL   LIPASE   Result Value Ref Range    Lipase 34 7 - 58 U/L   HCG QUAL SERUM   Result Value Ref Range    Beta-Hcg Qualitative Serum Negative Negative   URINALYSIS,CULTURE IF INDICATED   Result Value Ref Range    Color Yellow     Character Clear     Specific Gravity 1.015 <1.035    Ph 6.5 5.0 - 8.0    Glucose Negative Negative mg/dL    Ketones Negative Negative mg/dL    Protein Negative Negative mg/dL    Bilirubin Negative Negative    Nitrite Negative Negative    Leukocyte Esterase Moderate (A) Negative    Occult Blood Negative Negative    Micro Urine Req Microscopic    LACTIC ACID   Result Value Ref Range    Lactic Acid 0.9 0.5 - 2.0 mmol/L   ESTIMATED GFR   Result Value Ref Range    GFR If African American >60 >60 mL/min/1.73 m 2    GFR If Non African American >60 >60 mL/min/1.73 m 2   URINE MICROSCOPIC (W/UA)   Result Value Ref Range    WBC 5-10 (A) /hpf    RBC 0-2 /hpf    Bacteria Few (A) None /hpf    Epithelial Cells Few Few /hpf    Mucous Threads Few /hpf       Results for orders placed or performed during the hospital encounter of 07/21/19   CBC WITH DIFFERENTIAL   Result Value Ref Range    WBC 10.2 4.8 - 10.8 K/uL    RBC 4.53 4.20 - 5.40 M/uL    Hemoglobin 13.4 12.0 - 16.0 g/dL    Hematocrit 40.3 37.0 - 47.0 %    MCV 89.0 81.4 - 97.8 fL    MCH 29.6 27.0 - 33.0 pg    MCHC 33.3 (L) 33.6 - 35.0 g/dL    RDW 42.3 35.9 - 50.0 fL    Platelet Count 318 164 - 446 K/uL    MPV 11.0 9.0 - 12.9 fL    Neutrophils-Polys 62.60  44.00 - 72.00 %    Lymphocytes 29.50 22.00 - 41.00 %    Monocytes 6.50 0.00 - 13.40 %    Eosinophils 0.90 0.00 - 6.90 %    Basophils 0.20 0.00 - 1.80 %    Immature Granulocytes 0.30 0.00 - 0.90 %    Nucleated RBC 0.00 /100 WBC    Neutrophils (Absolute) 6.40 2.00 - 7.15 K/uL    Lymphs (Absolute) 3.01 1.00 - 4.80 K/uL    Monos (Absolute) 0.66 0.00 - 0.85 K/uL    Eos (Absolute) 0.09 0.00 - 0.51 K/uL    Baso (Absolute) 0.02 0.00 - 0.12 K/uL    Immature Granulocytes (abs) 0.03 0.00 - 0.11 K/uL    NRBC (Absolute) 0.00 K/uL   COMP METABOLIC PANEL   Result Value Ref Range    Sodium 138 135 - 145 mmol/L    Potassium 3.3 (L) 3.6 - 5.5 mmol/L    Chloride 106 96 - 112 mmol/L    Co2 24 20 - 33 mmol/L    Anion Gap 8.0 0.0 - 11.9    Glucose 79 65 - 99 mg/dL    Bun 9 8 - 22 mg/dL    Creatinine 0.66 0.50 - 1.40 mg/dL    Calcium 9.1 8.4 - 10.2 mg/dL    AST(SGOT) 18 12 - 45 U/L    ALT(SGPT) 12 2 - 50 U/L    Alkaline Phosphatase 61 30 - 99 U/L    Total Bilirubin 0.5 0.1 - 1.5 mg/dL    Albumin 4.0 3.2 - 4.9 g/dL    Total Protein 7.6 6.0 - 8.2 g/dL    Globulin 3.6 (H) 1.9 - 3.5 g/dL    A-G Ratio 1.1 g/dL   LIPASE   Result Value Ref Range    Lipase 34 7 - 58 U/L   HCG QUAL SERUM   Result Value Ref Range    Beta-Hcg Qualitative Serum Negative Negative   URINALYSIS,CULTURE IF INDICATED   Result Value Ref Range    Color Yellow     Character Clear     Specific Gravity 1.015 <1.035    Ph 6.5 5.0 - 8.0    Glucose Negative Negative mg/dL    Ketones Negative Negative mg/dL    Protein Negative Negative mg/dL    Bilirubin Negative Negative    Nitrite Negative Negative    Leukocyte Esterase Moderate (A) Negative    Occult Blood Negative Negative    Micro Urine Req Microscopic    LACTIC ACID   Result Value Ref Range    Lactic Acid 0.9 0.5 - 2.0 mmol/L   ESTIMATED GFR   Result Value Ref Range    GFR If African American >60 >60 mL/min/1.73 m 2    GFR If Non African American >60 >60 mL/min/1.73 m 2   URINE MICROSCOPIC (W/UA)   Result Value Ref Range     "WBC 5-10 (A) /hpf    RBC 0-2 /hpf    Bacteria Few (A) None /hpf    Epithelial Cells Few Few /hpf    Mucous Threads Few /hpf         COURSE & MEDICAL DECISION MAKING  Nursing notes and vital signs were reviewed. (See chart for details)  The patients  records were reviewed, history was obtained from the patient and her spouse;     The patient presents with vaginal itching, positive for candida, dysuria and back pain, and the differential diagnosis includes but is not limited to candidal vulvovaginitis, acute cystitis, pyelonephritis.       Initial orders in the Emergency Department included CBC CMP urinalysis lactic acid pregnancy test and initial treatment in the Emergency Department included normal saline and Toradol IV and the patient received IV 1 L for concern of potential sepsis    ED testing reveals no elevated lactic acid no high white count I do not think she is septic at this time.  Her swab from yesterday came back showing Candida and I will treat her with fluconazole and she also has moderate leukocyte esterase with 5-10 white blood cells and symptomatology to suggest a urinary tract infection so she will be placed on nitrofurantoin.      On repeat evaluation of the patient, there was a  good  response to medications, /75   Pulse 70   Temp 37 °C (98.6 °F) (Temporal)   Resp 16   Ht 1.499 m (4' 11\")   Wt 69.3 kg (152 lb 12.5 oz)   LMP 06/17/2019   SpO2 100%   BMI 30.86 kg/m²    vital signs, the patient's repeat exam was improved.    Additional work-up planned includes follow-up with primary care return to the ER if any fever vomiting or worsening symptoms.  This was discussed with patient she is comfortable with the plan    FINAL IMPRESSION  1.  Acute cystitis  2.  Candidal vulvovaginitis       DISPOSITION  Home in stable condition        FOLLOW UP:  No follow-up provider specified.    OUTPATIENT MEDICATIONS:  Discharge Medication List as of 7/21/2019  6:10 PM      START taking these " medications    Details   nitrofurantoin monohyd macro (MACROBID) 100 MG Cap Take 1 Cap by mouth 2 times a day for 5 days., Disp-10 Cap, R-0, Print Rx Paper         Fluconazole  150 mg #2     The patient verbally agreed to the discharge precautions and follow-up plan which is documented in EPIC.    Electronically signed by: Gaye Emanuel, 7/21/2019 4:50 PM

## 2019-07-21 NOTE — ED TRIAGE NOTES
"Pt comes in w/   D/o bilateral back/flank pain  \"feels like a kidney infection Pt had one this past year in January she thinks  Started yesterday  Pain chills and having difficulty urinating  Feeling worse today     "

## 2019-07-21 NOTE — ED NOTES
We are unable to collect urine sample presently.  Pt is aware of importance to obtain specimen when possible and to call for assistance prior to voiding.  Clean catch procedure has been reviewed; pt verbalizes understanding.

## 2019-07-30 ENCOUNTER — HOSPITAL ENCOUNTER (EMERGENCY)
Facility: MEDICAL CENTER | Age: 24
End: 2019-07-30
Attending: EMERGENCY MEDICINE
Payer: COMMERCIAL

## 2019-07-30 VITALS
RESPIRATION RATE: 18 BRPM | HEIGHT: 59 IN | OXYGEN SATURATION: 99 % | SYSTOLIC BLOOD PRESSURE: 114 MMHG | HEART RATE: 99 BPM | DIASTOLIC BLOOD PRESSURE: 71 MMHG | WEIGHT: 154.32 LBS | TEMPERATURE: 99.4 F | BODY MASS INDEX: 31.11 KG/M2

## 2019-07-30 DIAGNOSIS — F41.9 ANXIETY: ICD-10-CM

## 2019-07-30 LAB
ALBUMIN SERPL BCP-MCNC: 4.6 G/DL (ref 3.2–4.9)
ALBUMIN/GLOB SERPL: 1.3 G/DL
ALP SERPL-CCNC: 70 U/L (ref 30–99)
ALT SERPL-CCNC: 19 U/L (ref 2–50)
ANION GAP SERPL CALC-SCNC: 12 MMOL/L (ref 0–11.9)
AST SERPL-CCNC: 23 U/L (ref 12–45)
BASOPHILS # BLD AUTO: 0.1 % (ref 0–1.8)
BASOPHILS # BLD: 0.02 K/UL (ref 0–0.12)
BILIRUB SERPL-MCNC: 1.6 MG/DL (ref 0.1–1.5)
BUN SERPL-MCNC: 21 MG/DL (ref 8–22)
CALCIUM SERPL-MCNC: 9.4 MG/DL (ref 8.4–10.2)
CHLORIDE SERPL-SCNC: 106 MMOL/L (ref 96–112)
CO2 SERPL-SCNC: 18 MMOL/L (ref 20–33)
CREAT SERPL-MCNC: 0.83 MG/DL (ref 0.5–1.4)
EKG IMPRESSION: NORMAL
EOSINOPHIL # BLD AUTO: 0 K/UL (ref 0–0.51)
EOSINOPHIL NFR BLD: 0 % (ref 0–6.9)
ERYTHROCYTE [DISTWIDTH] IN BLOOD BY AUTOMATED COUNT: 40.4 FL (ref 35.9–50)
GLOBULIN SER CALC-MCNC: 3.5 G/DL (ref 1.9–3.5)
GLUCOSE SERPL-MCNC: 93 MG/DL (ref 65–99)
HCT VFR BLD AUTO: 45 % (ref 37–47)
HGB BLD-MCNC: 15.2 G/DL (ref 12–16)
IMM GRANULOCYTES # BLD AUTO: 0.05 K/UL (ref 0–0.11)
IMM GRANULOCYTES NFR BLD AUTO: 0.3 % (ref 0–0.9)
LIPASE SERPL-CCNC: 28 U/L (ref 7–58)
LYMPHOCYTES # BLD AUTO: 0.6 K/UL (ref 1–4.8)
LYMPHOCYTES NFR BLD: 4 % (ref 22–41)
MCH RBC QN AUTO: 29.4 PG (ref 27–33)
MCHC RBC AUTO-ENTMCNC: 33.8 G/DL (ref 33.6–35)
MCV RBC AUTO: 87 FL (ref 81.4–97.8)
MONOCYTES # BLD AUTO: 0.36 K/UL (ref 0–0.85)
MONOCYTES NFR BLD AUTO: 2.4 % (ref 0–13.4)
NEUTROPHILS # BLD AUTO: 13.81 K/UL (ref 2–7.15)
NEUTROPHILS NFR BLD: 93.2 % (ref 44–72)
NRBC # BLD AUTO: 0 K/UL
NRBC BLD-RTO: 0 /100 WBC
PLATELET # BLD AUTO: 324 K/UL (ref 164–446)
PMV BLD AUTO: 10.6 FL (ref 9–12.9)
POTASSIUM SERPL-SCNC: 3.6 MMOL/L (ref 3.6–5.5)
PROT SERPL-MCNC: 8.1 G/DL (ref 6–8.2)
RBC # BLD AUTO: 5.17 M/UL (ref 4.2–5.4)
SODIUM SERPL-SCNC: 136 MMOL/L (ref 135–145)
WBC # BLD AUTO: 14.8 K/UL (ref 4.8–10.8)

## 2019-07-30 PROCEDURE — 96374 THER/PROPH/DIAG INJ IV PUSH: CPT

## 2019-07-30 PROCEDURE — 85025 COMPLETE CBC W/AUTO DIFF WBC: CPT

## 2019-07-30 PROCEDURE — 93005 ELECTROCARDIOGRAM TRACING: CPT | Performed by: EMERGENCY MEDICINE

## 2019-07-30 PROCEDURE — 96375 TX/PRO/DX INJ NEW DRUG ADDON: CPT

## 2019-07-30 PROCEDURE — 700111 HCHG RX REV CODE 636 W/ 250 OVERRIDE (IP): Performed by: EMERGENCY MEDICINE

## 2019-07-30 PROCEDURE — 36415 COLL VENOUS BLD VENIPUNCTURE: CPT

## 2019-07-30 PROCEDURE — 83690 ASSAY OF LIPASE: CPT

## 2019-07-30 PROCEDURE — 99285 EMERGENCY DEPT VISIT HI MDM: CPT

## 2019-07-30 PROCEDURE — 80053 COMPREHEN METABOLIC PANEL: CPT

## 2019-07-30 RX ORDER — PROMETHAZINE HYDROCHLORIDE 25 MG/1
25 TABLET ORAL EVERY 6 HOURS PRN
Status: SHIPPED | COMMUNITY
End: 2019-09-13

## 2019-07-30 RX ORDER — DIPHENHYDRAMINE HYDROCHLORIDE 50 MG/ML
50 INJECTION INTRAMUSCULAR; INTRAVENOUS ONCE
Status: COMPLETED | OUTPATIENT
Start: 2019-07-30 | End: 2019-07-30

## 2019-07-30 RX ORDER — IBUPROFEN 200 MG
400 TABLET ORAL EVERY 6 HOURS PRN
Status: SHIPPED | COMMUNITY
End: 2020-06-19

## 2019-07-30 RX ORDER — CLONAZEPAM 0.5 MG/1
0.25 TABLET ORAL PRN
Status: SHIPPED | COMMUNITY
End: 2019-09-13

## 2019-07-30 RX ORDER — HALOPERIDOL 5 MG/ML
5 INJECTION INTRAMUSCULAR ONCE
Status: COMPLETED | OUTPATIENT
Start: 2019-07-30 | End: 2019-07-30

## 2019-07-30 RX ORDER — NITROFURANTOIN 25; 75 MG/1; MG/1
100 CAPSULE ORAL 2 TIMES DAILY
Status: SHIPPED | COMMUNITY
Start: 2019-07-21 | End: 2019-09-13

## 2019-07-30 RX ADMIN — HALOPERIDOL LACTATE 5 MG: 5 INJECTION, SOLUTION INTRAMUSCULAR at 16:50

## 2019-07-30 RX ADMIN — DIPHENHYDRAMINE HYDROCHLORIDE 50 MG: 50 INJECTION INTRAMUSCULAR; INTRAVENOUS at 16:50

## 2019-07-30 NOTE — ED TRIAGE NOTES
Pt comes in not feeling well  C/o symptoms started when she woke up this am  Chest pain, N/V/A and abdomen pain and severe anxiety    Did not have these symptoms yesterday except anxiety   Having trouble keeping fluids down

## 2019-07-30 NOTE — ED PROVIDER NOTES
ED Provider Note    CHIEF COMPLAINT  Chief Complaint   Patient presents with   • Chest Pain     as she woke up this am    • Nausea/Vomiting/Diarrhea     soon after waking up    • Abdominal Pain     woke up this am having pain    • Anxiety       HPI  Isa Mcclure is a 24 y.o. female who presents with abdominal pain.  The patient states she has not felt well throughout the day.  She states she feels like she is having a panic attack.  The patient has diffuse cramping abdominal pain with associated nausea, vomiting, and diarrhea.  She states she also has chest pain and some troubles breathing.  The patient denies difficulties with urination.  She states she has had anxiety in the past with similar panic attacks.  She does not have any cardiac risk factors.  She does not have any pain or swelling to her lower extremities nor does she have any risk factors from a DVT standpoint.    REVIEW OF SYSTEMS  See HPI for further details. All other systems are negative.     PAST MEDICAL HISTORY  Past Medical History:   Diagnosis Date   • Anxiety    • Depression    • Drug abuse, IV (HCC)    • Migraine        FAMILY HISTORY  [unfilled]    SOCIAL HISTORY  Social History     Social History   • Marital status: Single     Spouse name: N/A   • Number of children: N/A   • Years of education: N/A     Social History Main Topics   • Smoking status: Former Smoker     Packs/day: 0.50     Years: 6.00     Types: Cigarettes     Quit date: 8/13/2015   • Smokeless tobacco: Never Used   • Alcohol use No   • Drug use: No      Comment: quit meth Aug 2015, used IV   • Sexual activity: Not Currently     Other Topics Concern   • Not on file     Social History Narrative   • No narrative on file       SURGICAL HISTORY  Past Surgical History:   Procedure Laterality Date   • CHOLECYSTECTOMY         CURRENT MEDICATIONS  Home Medications     Reviewed by Heidy Rivera R.N. (Registered Nurse) on 07/30/19 at 6774  Med List Status: Not Addressed  "  Medication Last Dose Status   ondansetron (ZOFRAN ODT) 8 MG TABLET DISPERSIBLE 7/30/2019 Active   sumatriptan (IMITREX) 100 MG tablet  Active                ALLERGIES  Allergies   Allergen Reactions   • Bactrim Ds Hives     hives   • Hydrocodone Itching and Nausea       PHYSICAL EXAM  VITAL SIGNS: /71   Pulse (!) 120   Temp 37.4 °C (99.4 °F) (Temporal)   Resp (!) 22   Ht 1.499 m (4' 11\")   Wt 70 kg (154 lb 5.2 oz)   LMP 07/23/2019   SpO2 100%   BMI 31.17 kg/m²  Room air O2: 100    Constitutional: Anxious but nontoxic  HENT: Normocephalic, Atraumatic, Bilateral external ears normal, Oropharynx moist, No oral exudates, Nose normal.   Eyes: PERRLA, EOMI, Conjunctiva normal, No discharge.   Neck: Normal range of motion, No tenderness, Supple, No stridor.   Lymphatic: No lymphadenopathy noted.   Cardiovascular: Tachycardic heart rate, Normal rhythm, No murmurs, No rubs, No gallops.   Thorax & Lungs: Normal breath sounds, No respiratory distress, No wheezing, No chest tenderness.   Abdomen: Diffuse tenderness, no rebound, no guarding, normal bowel sounds  Skin: Warm, Dry, No erythema, No rash.   Back: No tenderness, No CVA tenderness.   Extremities: Intact distal pulses, No edema, No tenderness, No cyanosis, No clubbing.   Musculoskeletal: Good range of motion in all major joints. No tenderness to palpation or major deformities noted.   Neurologic: Alert & oriented x 3, Normal motor function, Normal sensory function, No focal deficits noted.   Psychiatric: Anxious.     Results for orders placed or performed during the hospital encounter of 07/30/19   CBC WITH DIFFERENTIAL   Result Value Ref Range    WBC 14.8 (H) 4.8 - 10.8 K/uL    RBC 5.17 4.20 - 5.40 M/uL    Hemoglobin 15.2 12.0 - 16.0 g/dL    Hematocrit 45.0 37.0 - 47.0 %    MCV 87.0 81.4 - 97.8 fL    MCH 29.4 27.0 - 33.0 pg    MCHC 33.8 33.6 - 35.0 g/dL    RDW 40.4 35.9 - 50.0 fL    Platelet Count 324 164 - 446 K/uL    MPV 10.6 9.0 - 12.9 fL    " Neutrophils-Polys 93.20 (H) 44.00 - 72.00 %    Lymphocytes 4.00 (L) 22.00 - 41.00 %    Monocytes 2.40 0.00 - 13.40 %    Eosinophils 0.00 0.00 - 6.90 %    Basophils 0.10 0.00 - 1.80 %    Immature Granulocytes 0.30 0.00 - 0.90 %    Nucleated RBC 0.00 /100 WBC    Neutrophils (Absolute) 13.81 (H) 2.00 - 7.15 K/uL    Lymphs (Absolute) 0.60 (L) 1.00 - 4.80 K/uL    Monos (Absolute) 0.36 0.00 - 0.85 K/uL    Eos (Absolute) 0.00 0.00 - 0.51 K/uL    Baso (Absolute) 0.02 0.00 - 0.12 K/uL    Immature Granulocytes (abs) 0.05 0.00 - 0.11 K/uL    NRBC (Absolute) 0.00 K/uL   COMP METABOLIC PANEL   Result Value Ref Range    Sodium 136 135 - 145 mmol/L    Potassium 3.6 3.6 - 5.5 mmol/L    Chloride 106 96 - 112 mmol/L    Co2 18 (L) 20 - 33 mmol/L    Anion Gap 12.0 (H) 0.0 - 11.9    Glucose 93 65 - 99 mg/dL    Bun 21 8 - 22 mg/dL    Creatinine 0.83 0.50 - 1.40 mg/dL    Calcium 9.4 8.4 - 10.2 mg/dL    AST(SGOT) 23 12 - 45 U/L    ALT(SGPT) 19 2 - 50 U/L    Alkaline Phosphatase 70 30 - 99 U/L    Total Bilirubin 1.6 (H) 0.1 - 1.5 mg/dL    Albumin 4.6 3.2 - 4.9 g/dL    Total Protein 8.1 6.0 - 8.2 g/dL    Globulin 3.5 1.9 - 3.5 g/dL    A-G Ratio 1.3 g/dL   LIPASE   Result Value Ref Range    Lipase 28 7 - 58 U/L   ESTIMATED GFR   Result Value Ref Range    GFR If African American >60 >60 mL/min/1.73 m 2    GFR If Non African American >60 >60 mL/min/1.73 m 2   EKG   Result Value Ref Range    Report       [facility] Emergency Dept.    Test Date:  2019  Pt Name:    KENNEY CISSE                Department: [facility]  MRN:        [id number]                      [facility]  Gender:     Female                       Technician: BRIANNA  :        [id number]                   Requested By:HERMES CHAND  Order #:    [id number]                    Reading MD: HERMES CHAND MD    Measurements  Intervals                                Axis  Rate:       113                          P:          53  OR:         142                           QRS:        19  QRSD:       80                           T:          37  QT:         320  QTc:        439    Interpretive Statements  Twelve-lead EKG shows a sinus tachycardia with a ventricular rate of 113,  normal  QRS, normal intervals, no ST segment elevation or depression  Electronically Signed On 7- 17:40:40 PDT by FRANKY KRAMER MD             COURSE & MEDICAL DECISION MAKING  Pertinent Labs & Imaging studies reviewed. (See chart for details)  This a 24-year-old female who presents the emergency department with multiple complaints.  I suspect this is all secondary to anxiety.  The patient received intravenous Haldol and Benadryl.  On repeat exam her abdomen is completely benign.  She does have a slight leukocytosis but I suspect this is demargination.  A surgical source would be unlikely as the patient is asymptomatic on repeat exam.  As for the chest pain and difficulty breathing she does not have any cardiac risk factors and this would be very low risk.  The patient does feel significantly better at the time of discharge and she is instructed take Benadryl as needed for further anxiety.  She is instructed to follow-up with her primary care provider in 48 to 72 hours.    FINAL IMPRESSION  1.  Chest pain  2.  Abdominal pain  3.  Anxiety         Electronically signed by: Franky Kramer, 7/30/2019 4:36 PM

## 2019-07-31 NOTE — ED NOTES
Iv placed , labs drawn and taken to lab. Pt medicated as directed by TANNER mckeon, poc update given to pt. Further orders and dispo pending at this time. No further questions from pt at this time

## 2019-07-31 NOTE — DISCHARGE INSTRUCTIONS
Take Benadryl as needed for further anxiety    Follow-up with your primary care provider in 48 to 72 hours    Return if you are acutely worse

## 2019-07-31 NOTE — ED NOTES
All  Labs resulted , pt re evaluated by ERP.  Pt to be d/c home, family to taker her home.  Pt denies any SI thoughts

## 2019-08-27 ENCOUNTER — OFFICE VISIT (OUTPATIENT)
Dept: MEDICAL GROUP | Facility: LAB | Age: 24
End: 2019-08-27
Payer: COMMERCIAL

## 2019-08-27 VITALS
HEART RATE: 89 BPM | WEIGHT: 153 LBS | BODY MASS INDEX: 30.84 KG/M2 | HEIGHT: 59 IN | OXYGEN SATURATION: 94 % | SYSTOLIC BLOOD PRESSURE: 120 MMHG | DIASTOLIC BLOOD PRESSURE: 84 MMHG | TEMPERATURE: 98.2 F

## 2019-08-27 DIAGNOSIS — F33.1 MODERATE EPISODE OF RECURRENT MAJOR DEPRESSIVE DISORDER (HCC): ICD-10-CM

## 2019-08-27 DIAGNOSIS — G43.009 MIGRAINE WITHOUT AURA AND WITHOUT STATUS MIGRAINOSUS, NOT INTRACTABLE: ICD-10-CM

## 2019-08-27 DIAGNOSIS — F43.10 PTSD (POST-TRAUMATIC STRESS DISORDER): ICD-10-CM

## 2019-08-27 DIAGNOSIS — Z13.29 SCREENING FOR THYROID DISORDER: ICD-10-CM

## 2019-08-27 DIAGNOSIS — N91.2 AMENORRHEA: ICD-10-CM

## 2019-08-27 DIAGNOSIS — F41.0 PANIC ATTACKS: ICD-10-CM

## 2019-08-27 LAB
INT CON NEG: NORMAL
INT CON POS: NORMAL
POC URINE PREGNANCY TEST: NEGATIVE

## 2019-08-27 PROCEDURE — 99214 OFFICE O/P EST MOD 30 MIN: CPT | Performed by: FAMILY MEDICINE

## 2019-08-27 PROCEDURE — 81025 URINE PREGNANCY TEST: CPT | Performed by: FAMILY MEDICINE

## 2019-08-27 SDOH — HEALTH STABILITY: MENTAL HEALTH: HOW OFTEN DO YOU HAVE A DRINK CONTAINING ALCOHOL?: 2-3 TIMES A WEEK

## 2019-08-27 SDOH — HEALTH STABILITY: MENTAL HEALTH: HOW MANY STANDARD DRINKS CONTAINING ALCOHOL DO YOU HAVE ON A TYPICAL DAY?: 1 OR 2

## 2019-08-27 ASSESSMENT — PATIENT HEALTH QUESTIONNAIRE - PHQ9
SUM OF ALL RESPONSES TO PHQ QUESTIONS 1-9: 25
5. POOR APPETITE OR OVEREATING: 3 - NEARLY EVERY DAY
CLINICAL INTERPRETATION OF PHQ2 SCORE: 6

## 2019-08-27 NOTE — ASSESSMENT & PLAN NOTE
Patient gets headaches 1-2 times a month that are behind both eyes and bitemporally.  They seem to be improving.  And she denies any neurologic symptoms.  No auras, nausea or vomiting

## 2019-08-27 NOTE — ASSESSMENT & PLAN NOTE
Worsening. Feeling hopeless.  She has was tried on Zoloft, Effexor and Seraquil.  Had nausea, difficulty eating, insomnia when on 50 mg of Zoloft  Wellbutrin stopped working after several months  Suicidal thoughts with lexapro    Patient denies SI/HI.  Depression Screen (PHQ-2/PHQ-9) 10/4/2017 11/7/2017 8/27/2019   PHQ-2 Total Score 6 6 6   PHQ-9 Total Score 24 22 25
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
## 2019-08-28 NOTE — ASSESSMENT & PLAN NOTE
Patient reports that she her period is 3-4 days late.  She is not using any birth control currently is sexually active.

## 2019-09-03 ENCOUNTER — HOSPITAL ENCOUNTER (OUTPATIENT)
Dept: BEHAVIORAL HEALTH | Facility: MEDICAL CENTER | Age: 24
End: 2019-09-03
Attending: PSYCHIATRY & NEUROLOGY
Payer: COMMERCIAL

## 2019-09-03 ENCOUNTER — HOSPITAL ENCOUNTER (OUTPATIENT)
Dept: LAB | Facility: MEDICAL CENTER | Age: 24
End: 2019-09-03
Attending: FAMILY MEDICINE
Payer: COMMERCIAL

## 2019-09-03 DIAGNOSIS — F33.1 MODERATE EPISODE OF RECURRENT MAJOR DEPRESSIVE DISORDER (HCC): ICD-10-CM

## 2019-09-03 DIAGNOSIS — Z13.29 SCREENING FOR THYROID DISORDER: ICD-10-CM

## 2019-09-03 LAB
T4 FREE SERPL-MCNC: 1.02 NG/DL (ref 0.53–1.43)
TSH SERPL DL<=0.005 MIU/L-ACNC: 1.68 UIU/ML (ref 0.38–5.33)

## 2019-09-03 PROCEDURE — 84443 ASSAY THYROID STIM HORMONE: CPT

## 2019-09-03 PROCEDURE — 36415 COLL VENOUS BLD VENIPUNCTURE: CPT

## 2019-09-03 PROCEDURE — 84439 ASSAY OF FREE THYROXINE: CPT

## 2019-09-03 PROCEDURE — 90791 PSYCH DIAGNOSTIC EVALUATION: CPT
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
## 2019-09-04 NOTE — PROGRESS NOTES
I have reviewed the note by Scarlett Baker RN and agree with the assessment and treatment plan.    1. Admit to Intensive Outpatient Program on 9/9/19    - Group therapy per schedule,    - Psychoeducational groups per schedule,   - Individual/family counseling sessions with  per treatment plan.  2. Symptoms necessitating Intensive Outpatient Treatment: depression  3. Medical screening/Physical exam per primary care provider or referring facility.    Eden Aguirre MD

## 2019-09-09 ENCOUNTER — HOSPITAL ENCOUNTER (OUTPATIENT)
Dept: BEHAVIORAL HEALTH | Facility: MEDICAL CENTER | Age: 24
End: 2019-09-09
Attending: PSYCHIATRY & NEUROLOGY
Payer: COMMERCIAL

## 2019-09-09 DIAGNOSIS — F33.1 MAJOR DEPRESSIVE DISORDER, RECURRENT EPISODE, MODERATE (HCC): ICD-10-CM

## 2019-09-09 DIAGNOSIS — F33.1 MODERATE EPISODE OF RECURRENT MAJOR DEPRESSIVE DISORDER (HCC): ICD-10-CM

## 2019-09-09 PROCEDURE — 90832 PSYTX W PT 30 MINUTES: CPT

## 2019-09-09 PROCEDURE — 90853 GROUP PSYCHOTHERAPY: CPT | Performed by: MARRIAGE & FAMILY THERAPIST

## 2019-09-09 NOTE — BH THERAPY
Group Therapy Checklist  Attendance: Attended  Attendance Duration (min): (60)  Number of Participants: 12  Program/Group: Intensive Outpatient Program  Topics Covered: Addiction behaviors  Participation: Limited verbal participation, Attentive  Affect/Mood Range: Constricted  Affect/Mood Display: Congruent w/content  Cognition: Oriented, Alert  Evidence of Imminent Suicide Risk: No  Evidence of imminent homicide risk: No  Therapeutic Interventions: Psychoeducation re: (Comment), Cognitive clarification  Progress Toward Treatment Goal: Moderate improvement

## 2019-09-09 NOTE — BH THERAPY
Pt left at break to take her mother to a doctor appt. She was unable to return for the second   hour of Program this morning.

## 2019-09-10 NOTE — CARE PLAN
"  Problem: Mood Instability Interfering with Adl’S  Goal: Stable mood and functioning  Outcome: PROGRESSING AS EXPECTED  Note:    [facility]  Therapy Progress Note    Patient Name: Isa Mcclure  Patient MRN: [id number]  Today's Date: 9/9/2019     Type of session:Individual psychotherapy  Length of session: 30 minutes  Persons in attendance:Patient and Children    Subjective/New Info: Subjective/New Info: Isa brought her 3 year old son and 4 year old daughter to her individual session because she could not get  and she forgot her assessments.  She said she liked the forum about addiction today because she learned some things about herself and she liked the process group because she heard other people who were going through some of the same things she is going through.  She said her divorce is proceeding and she was mistaken in her reasons for getting  and now realizes she rushed into it too fast.  She has not been using any substances but admitted to thinking about marijuana daily and knows that she feels better and functions better without it.  She says it makes her more depressed when she uses it and she is a better mother without it but it is still \"always in the back of my mind\".  She was encouraged to go to a Marijuana's Anonymous meeting and given directions.  She will try to get her mother to watch the kids and will go over her assessments next Monday.  She denies SI.    Objective/Observations:   Participation: Active verbal participation, Attentive, Engaged and Open to feedback   Grooming: Casual   Cognition: Alert and Fully Oriented   Eye contact: Good   Mood: Euthymic   Affect: Flexible and Full range   Thought process: Logical and Goal-directed   Speech: Rate within normal limits and Volume within normal limits   Other:     Diagnoses: No diagnosis found.F33.1, F12.11     Current risk:   SUICIDE: Not applicable   Homicide: Not applicable   Self-harm: Not " "applicable   Relapse: Moderate   Other:    Safety Plan reviewed? Not Indicated   If evidence of imminent risk is present, intervention/plan:     Therapeutic Intervention(s): Establish rapport, Goal-setting, Maladaptive behavior addressed, Parenting skills, Positive behavior reinforced, Stressors assessed and Treatment compliance addressed    Treatment Goal(s)/Objective(s) addressed: met to establish rapport and treatment plan    Progress toward Treatment Goals: Mild improvement    Plan:  - \"Homework\" recommendation: complete assessments and attend marijuana anonymous     Scarlett Baker R.N.  9/9/2019                                       "

## 2019-09-11 ENCOUNTER — HOSPITAL ENCOUNTER (OUTPATIENT)
Dept: BEHAVIORAL HEALTH | Facility: MEDICAL CENTER | Age: 24
End: 2019-09-11
Attending: PSYCHIATRY & NEUROLOGY
Payer: COMMERCIAL

## 2019-09-11 DIAGNOSIS — F33.1 MODERATE EPISODE OF RECURRENT MAJOR DEPRESSIVE DISORDER (HCC): ICD-10-CM

## 2019-09-11 PROCEDURE — 90853 GROUP PSYCHOTHERAPY: CPT

## 2019-09-11 NOTE — BH THERAPY
Group Therapy Checklist  Attendance: Attended  Attendance Duration (min): (60)  Number of Participants: 9  Program/Group: Intensive Outpatient Program  Topics Covered: 12 Step orientation  Participation: Active verbal participation, Attentive  Affect/Mood Range: Normal range, Flexible  Affect/Mood Display: Congruent w/content  Cognition: Alert, Oriented  Evidence of Imminent Suicide Risk: No  Evidence of imminent homicide risk: No  Therapeutic Interventions: Cognitive clarification, Relapse prevention  Progress Toward Treatment Goal: Mild improvement

## 2019-09-11 NOTE — BH THERAPY
Group Therapy Checklist  Attendance Duration (min): (90)  Number of Participants: 8  Program/Group: Intensive Outpatient Program  Topics Covered: (Group Therapy)  Participation: Active verbal participation, Attentive.  Pt talked about her depression and that her  is getting ready to leave her over it.  She is not on any medication but willing to take something and see the psychiatrist.   Affect/Mood Range: Constricted  Affect/Mood Display: Congruent w/content, Sad  Cognition: Oriented, Alert  Evidence of Imminent Suicide Risk: No  Evidence of imminent homicide risk: No  Therapeutic Interventions: Emotion clarification, Cognitive clarification  Progress Toward Treatment Goal: Moderate improvement

## 2019-09-12 NOTE — MR AVS SNAPSHOT
"        Isa Mcclure   2017 12:50 PM   Office Visit   MRN: [id number]    Department[facility]   Dept Phone:  [phone]    Description:  Female : 1995   Provider:  Theresa Goss M.D.           Reason for Visit     Establish Care           Allergies as of 2017     No Known Allergies      You were diagnosed with     Moderate episode of recurrent major depressive disorder (HCC)   [6434600]       Anxiety   [140632]       Need for vaccination   [816395]       PTSD (post-traumatic stress disorder)   [006199]       Methamphetamine abuse in remission   [529937]       Screening for viral disease   [477417]         Vital Signs     Blood Pressure Pulse Temperature Respirations Height Weight    112/72 mmHg 88 37 °C (98.6 °F) 20 1.511 m (4' 11.49\") 60.782 kg (134 lb)    Body Mass Index Oxygen Saturation Last Menstrual Period Breastfeeding? Smoking Status       26.62 kg/m2 98% 2017 No Former Smoker       Basic Information     Date Of Birth Sex Race Ethnicity Preferred Language    1995 Female  or   Origin (Azeri,Bahamian,Bangladeshi,Ecuadorean, etc) English      Your appointments     Feb 15, 2017  1:10 PM   Established Patient with Theresa Goss M.D.   [facility] ([facility])    [address] [place] [id number] [phone]           You will be receiving a confirmation call a few days before your appointment from our automated call confirmation system.              Problem List              ICD-10-CM Priority Class Noted - Resolved    Methamphetamine abuse in remission F15.10   2014 - Present    Moderate episode of recurrent major depressive disorder (HCC) F33.1   2017 - Present    Anxiety F41.9   2017 - Present    PTSD (post-traumatic stress disorder) F43.10   2017 - Present      Health Maintenance     Patient has no pending health maintenance at this time      Current Immunizations     Influenza Vaccine Quad Inj (Pf)  " Awake Incomplete      Below and/or attached are the medications your provider expects you to take. Review all of your home medications and newly ordered medications with your provider and/or pharmacist. Follow medication instructions as directed by your provider and/or pharmacist. Please keep your medication list with you and share with your provider. Update the information when medications are discontinued, doses are changed, or new medications (including over-the-counter products) are added; and carry medication information at all times in the event of emergency situations     Allergies:  No Known Allergies          Medications  Valid as of: January 13, 2017 -  1:37 PM    Generic Name Brand Name Tablet Size Instructions for use    ClonazePAM (Tab) KLONOPIN 0.5 MG Take 1 Tab by mouth 2 times a day.        Sertraline HCl (Tab) ZOLOFT 50 MG Take 1 Tab by mouth every day.        .                 Medicines prescribed today were sent to:     [facility] [address] [place] [address] (S), [place] [address]    [facility] [place] ([place]) [place] [address]    Phone: [phone] Fax: [phone]    Open 24 Hours?: No      Medication refill instructions:       If your prescription bottle indicates you have medication refills left, it is not necessary to call your provider’s office. Please contact your pharmacy and they will refill your medication.    If your prescription bottle indicates you do not have any refills left, you may request refills at any time through one of the following ways: The online [employer] system (except Urgent Care), by calling your provider’s office, or by asking your pharmacy to contact your provider’s office with a refill request. Medication refills are processed only during regular business hours and may not be available until the next business day. Your provider may request additional information or to have a follow-up visit with you prior to refilling your medication.   *Please Note: Medication refills are assigned a  new Rx number when refilled electronically. Your pharmacy may indicate that no refills were authorized even though a new prescription for the same medication is available at the pharmacy. Please request the medicine by name with the pharmacy before contacting your provider for a refill.        Your To Do List     Future Labs/Procedures Complete By Expires    HIV ANTIBODIES  As directed 1/13/2018      Referral     A referral request has been sent to our patient care coordination department. Please allow 3-5 business days for us to process this request and contact you either by phone or mail. If you do not hear from us by the 5th business day, please call us at [phone].           "[employer] Access Code: [email or web address]  Expires: 2/12/2017  1:37 PM    "[employer]  A secure, online tool to manage your health information     [facility]’s "[employer]® is a secure, online tool that connects you to your personalized health information from the privacy of your home -- day or night - making it very easy for you to manage your healthcare. Once the activation process is completed, you can even access your medical information using the "[employer] adela, which is available for free in the Apple Adela store or Google Play store.     "[employer] provides the following levels of access (as shown below):   My Chart Features   [facility] Doctor [facility]  Specialists [facility]  Urgent  Care [facility]  Primary Care  Doctor   Email your healthcare team securely and privately 24/7 X X X    Manage appointments: schedule your next appointment; view details of past/upcoming appointments X      Request prescription refills. X      View recent personal medical records, including lab and immunizations X X X X   View health record, including health history, allergies, medications X X X X   Read reports about your outpatient visits, procedures, consult and ER notes X X X X   See your discharge summary, which is a recap of your hospital and/or ER visit  that includes your diagnosis, lab results, and care plan. X X       How to register for efw-suhl:  1. Go to  [email or web address].  2. Click on the Sign Up Now box, which takes you to the New Member Sign Up page. You will need to provide the following information:  a. Enter your efw-suhl Access Code exactly as it appears at the top of this page. (You will not need to use this code after you’ve completed the sign-up process. If you do not sign up before the expiration date, you must request a new code.)   b. Enter your date of birth.   c. Enter your home email address.   d. Click Submit, and follow the next screen’s instructions.  3. Create a Flexenclosuret ID. This will be your Flexenclosuret login ID and cannot be changed, so think of one that is secure and easy to remember.  4. Create a Flexenclosuret password. You can change your password at any time.  5. Enter your Password Reset Question and Answer. This can be used at a later time if you forget your password.   6. Enter your e-mail address. This allows you to receive e-mail notifications when new information is available in efw-suhl.  7. Click Sign Up. You can now view your health information.    For assistance activating your efw-suhl account, call [phone]

## 2019-09-13 ENCOUNTER — HOSPITAL ENCOUNTER (OUTPATIENT)
Dept: BEHAVIORAL HEALTH | Facility: MEDICAL CENTER | Age: 24
End: 2019-09-13
Attending: PSYCHIATRY & NEUROLOGY
Payer: COMMERCIAL

## 2019-09-13 VITALS
HEART RATE: 99 BPM | DIASTOLIC BLOOD PRESSURE: 89 MMHG | WEIGHT: 152 LBS | SYSTOLIC BLOOD PRESSURE: 144 MMHG | BODY MASS INDEX: 30.64 KG/M2 | HEIGHT: 59 IN

## 2019-09-13 DIAGNOSIS — F43.10 PTSD (POST-TRAUMATIC STRESS DISORDER): ICD-10-CM

## 2019-09-13 DIAGNOSIS — F41.9 ANXIETY: ICD-10-CM

## 2019-09-13 DIAGNOSIS — F33.2 MDD (MAJOR DEPRESSIVE DISORDER), RECURRENT SEVERE, WITHOUT PSYCHOSIS (HCC): ICD-10-CM

## 2019-09-13 DIAGNOSIS — F15.11 METHAMPHETAMINE USE DISORDER, MILD, IN SUSTAINED REMISSION (HCC): ICD-10-CM

## 2019-09-13 PROBLEM — F30.8: Status: RESOLVED | Noted: 2018-08-15 | Resolved: 2019-09-13

## 2019-09-13 PROBLEM — F41.0 PANIC ATTACKS: Status: RESOLVED | Noted: 2019-08-27 | Resolved: 2019-09-13

## 2019-09-13 PROBLEM — F33.1 MODERATE EPISODE OF RECURRENT MAJOR DEPRESSIVE DISORDER (HCC): Status: RESOLVED | Noted: 2017-01-13 | Resolved: 2019-09-13

## 2019-09-13 PROCEDURE — 99204 OFFICE O/P NEW MOD 45 MIN: CPT | Performed by: PSYCHIATRY & NEUROLOGY

## 2019-09-13 PROCEDURE — 90853 GROUP PSYCHOTHERAPY: CPT

## 2019-09-13 RX ORDER — MIRTAZAPINE 7.5 MG/1
7.5 TABLET, FILM COATED ORAL
Qty: 30 TAB | Refills: 0 | Status: SHIPPED | OUTPATIENT
Start: 2019-09-13 | End: 2019-12-12

## 2019-09-13 RX ORDER — ESCITALOPRAM OXALATE 10 MG/1
TABLET ORAL
Qty: 30 TAB | Refills: 0 | Status: SHIPPED | OUTPATIENT
Start: 2019-09-13 | End: 2019-10-14 | Stop reason: SDUPTHER

## 2019-09-13 SDOH — HEALTH STABILITY: MENTAL HEALTH: HOW OFTEN DO YOU HAVE A DRINK CONTAINING ALCOHOL?: 2-4 TIMES A MONTH

## 2019-09-13 SDOH — HEALTH STABILITY: MENTAL HEALTH: HOW MANY STANDARD DRINKS CONTAINING ALCOHOL DO YOU HAVE ON A TYPICAL DAY?: NOT ASKED
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
## 2019-09-13 NOTE — BH THERAPY
Group Therapy Checklist  Attendance: Attended  Attendance Duration (min): (60)  Number of Participants: 8  Program/Group: Intensive Outpatient Program  Topics Covered: Codependency  Participation: Active verbal participation, Attentive  Affect/Mood Range: Normal range, Flexible  Affect/Mood Display: Congruent w/content  Cognition: Alert, Oriented  Evidence of Imminent Suicide Risk: No  Therapeutic Interventions: Emotion clarification, Cognitive clarification, Self-care skills  Progress Toward Treatment Goal: Mild improvement

## 2019-09-13 NOTE — BH THERAPY
Group Therapy Checklist  Attendance: Attended  Attendance Duration (min): (90)  Number of Participants: 8  Program/Group: Intensive Outpatient Program  Topics Covered: (Group Therapy)  Participation: Active verbal participation, Attentive.  Pt processed  asking for a divorce after 4 months of marriage and not being interested in marriage counseling.  She was feeling overwhelmed with emotion.   Affect/Mood Range: Constricted  Affect/Mood Display: Congruent w/content, Sad  Cognition: Oriented, Alert  Evidence of Imminent Suicide Risk: No  Evidence of imminent homicide risk: No  Therapeutic Interventions: Emotion clarification, Cognitive clarification  Progress Toward Treatment Goal: Moderate improvement

## 2019-09-16 ENCOUNTER — HOSPITAL ENCOUNTER (OUTPATIENT)
Dept: BEHAVIORAL HEALTH | Facility: MEDICAL CENTER | Age: 24
End: 2019-09-16
Attending: PSYCHIATRY & NEUROLOGY
Payer: COMMERCIAL

## 2019-09-16 DIAGNOSIS — F33.1 MAJOR DEPRESSIVE DISORDER, RECURRENT EPISODE, MODERATE (HCC): ICD-10-CM

## 2019-09-16 DIAGNOSIS — F33.2 MDD (MAJOR DEPRESSIVE DISORDER), RECURRENT SEVERE, WITHOUT PSYCHOSIS (HCC): ICD-10-CM

## 2019-09-16 DIAGNOSIS — F15.11 METHAMPHETAMINE USE DISORDER, MILD, IN SUSTAINED REMISSION (HCC): ICD-10-CM

## 2019-09-16 PROCEDURE — 90853 GROUP PSYCHOTHERAPY: CPT | Performed by: MARRIAGE & FAMILY THERAPIST

## 2019-09-16 PROCEDURE — 90834 PSYTX W PT 45 MINUTES: CPT

## 2019-09-16 NOTE — CARE PLAN
Problem: Mood Instability Interfering with Adl’S  Goal: Stable mood and functioning  Note:    [facility]  Therapy Progress Note    Patient Name: Isa Mcclure  Patient MRN: [id number]  Today's Date: 9/16/2019     Type of session:Individual psychotherapy  Length of session: 45 minutes  Persons in attendance:Patient    Subjective/New Info: She presented her assessments and said her mother has been depressed forever and is self absorbed and has always been on antidepressants and her father is judgmental and critical and they have never got along.  She  a friend of her brother's who is  her and moving out tomorrow and she thought marrying him would help her to individuate from her parents but he was not in a place financially to do that and she decided she was not in love with him and it was a mistake to  him.  She admits she still feels suicidal at times and last felt that way 2 weeks ago and she called her best friend in [place] and her friend's kind words healed her and denies current SI.  She also still has thoughts of the eating disorder she used to have but has not acted on it.  She started seeing a therapist last week and will continue to see her after she has completed IOP.  She has a spiritual belief and no practice except she meditates daily.  We discussed her checking out a [facility] that has a [facility] option and she said she had looked into it already but when it is time to go she stays home and isolates.  She has also thought of getting a job or going to school to be a dental hygienist but she always gets overwhelmed by the thought of childcare for her 2 small children and never pursues it.  She has low self esteem issue and doesn't think she is a lovable person and thinks she has been cheated by life but she has hope for getting better.  She has strong co dependent traits and had 13/17 yes answers.  She denies any desire to drink or use  "drugs.    Objective/Observations:   Participation: Active verbal participation, Attentive, Engaged and Open to feedback   Grooming: Casual   Cognition: Alert and Fully Oriented   Eye contact: Good   Mood: Euthymic and Depressed   Affect: Flexible and Congruent with content   Thought process: Logical and Goal-directed   Speech: Rate within normal limits and Volume within normal limits   Other:     Diagnoses: No diagnosis found. MDD F33.1, Metamphetamine use disorder in sustained remission F15.11    Current risk:   SUICIDE: Low   Homicide: Not applicable   Self-harm: Low   Relapse: Low   Other:    Safety Plan reviewed? No   If evidence of imminent risk is present, intervention/plan:     Therapeutic Intervention(s): Establish rapport, Goal-setting, Leisure and recreation skills, Maladaptive behavior addressed, Parenting/familial roles addressed, Positive behavior reinforced, Self-care skills, Stressors assessed and Treatment compliance addressed    Treatment Goal(s)/Objective(s) addressed: She presented her assessments and talked about her life feeling difficult and overwhelming now.     Progress toward Treatment Goals: Mild improvement    Plan:  - \"Homework\" recommendation: Self Compassion workbook, CODA schedule given.     Scarlett Baker R.N.  9/16/2019                                       "
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
## 2019-09-16 NOTE — BH THERAPY
Group Therapy Checklist  Attendance: Attended  Attendance Duration (min): (60)  Number of Participants: 12  Program/Group: Intensive Outpatient Program  Topics Covered: Steps 1/2/3  Participation: No verbal participation  Affect/Mood Range: Constricted  Affect/Mood Display: Congruent w/content  Cognition: Oriented, Alert  Evidence of Imminent Suicide Risk: No  Evidence of imminent homicide risk: No  Therapeutic Interventions: Psychoeducation re: (Comment), Cognitive clarification  Progress Toward Treatment Goal: Moderate improvement

## 2019-09-18 ENCOUNTER — HOSPITAL ENCOUNTER (OUTPATIENT)
Dept: BEHAVIORAL HEALTH | Facility: MEDICAL CENTER | Age: 24
End: 2019-09-18
Attending: PSYCHIATRY & NEUROLOGY
Payer: COMMERCIAL

## 2019-09-18 DIAGNOSIS — F33.2 MDD (MAJOR DEPRESSIVE DISORDER), RECURRENT SEVERE, WITHOUT PSYCHOSIS (HCC): ICD-10-CM

## 2019-09-18 PROCEDURE — 90853 GROUP PSYCHOTHERAPY: CPT

## 2019-09-18 NOTE — BH THERAPY
Group Therapy Checklist  Attendance: Attended  Attendance Duration (min): (90)  Number of Participants: 10  Program/Group: Intensive Outpatient Program  Topics Covered: (Group Therapy)  Participation: Active verbal participation, Attentive.  Pt processed the emotion of 'fear' as her most challenging.   Affect/Mood Range: Flexible  Affect/Mood Display: Congruent w/content  Cognition: Oriented, Alert  Evidence of Imminent Suicide Risk: No  Evidence of imminent homicide risk: No  Therapeutic Interventions: Emotion clarification, Cognitive clarification  Progress Toward Treatment Goal: Moderate improvement

## 2019-09-18 NOTE — BH THERAPY
Group Therapy Checklist  Attendance: Attended  Attendance Duration (min): (60)  Number of Participants: 10  Program/Group: Intensive Outpatient Program  Topics Covered: Caretaking, Caring in Relationships  Participation: Active verbal participation, Attentive  Affect/Mood Range: Normal range, Flexible  Affect/Mood Display: Congruent w/content  Cognition: Alert, Oriented  Evidence of Imminent Suicide Risk: No  Evidence of imminent homicide risk: No  Therapeutic Interventions: Cognitive clarification, Emotion clarification  Progress Toward Treatment Goal: Mild improvement

## 2019-09-20 ENCOUNTER — HOSPITAL ENCOUNTER (OUTPATIENT)
Dept: BEHAVIORAL HEALTH | Facility: MEDICAL CENTER | Age: 24
End: 2019-09-20
Attending: PSYCHIATRY & NEUROLOGY
Payer: COMMERCIAL

## 2019-09-20 VITALS
DIASTOLIC BLOOD PRESSURE: 93 MMHG | WEIGHT: 151 LBS | BODY MASS INDEX: 30.5 KG/M2 | HEART RATE: 98 BPM | SYSTOLIC BLOOD PRESSURE: 134 MMHG

## 2019-09-20 DIAGNOSIS — F33.2 MDD (MAJOR DEPRESSIVE DISORDER), RECURRENT SEVERE, WITHOUT PSYCHOSIS (HCC): ICD-10-CM

## 2019-09-20 DIAGNOSIS — F15.11 METHAMPHETAMINE USE DISORDER, MILD, IN SUSTAINED REMISSION (HCC): ICD-10-CM

## 2019-09-20 DIAGNOSIS — F43.10 PTSD (POST-TRAUMATIC STRESS DISORDER): ICD-10-CM

## 2019-09-20 PROCEDURE — 99214 OFFICE O/P EST MOD 30 MIN: CPT | Performed by: PSYCHIATRY & NEUROLOGY

## 2019-09-20 PROCEDURE — 90853 GROUP PSYCHOTHERAPY: CPT

## 2019-09-20 RX ORDER — UREA 10 %
LOTION (ML) TOPICAL
COMMUNITY
End: 2020-06-19

## 2019-09-20 NOTE — BH THERAPY
Group Therapy Checklist  Attendance: Attended  Attendance Duration (min): (60)  Number of Participants: 12  Program/Group: Intensive Outpatient Program  Topics Covered: Med aspects [place]  Participation: Attentive  Affect/Mood Range: Normal range, Flexible  Affect/Mood Display: Congruent w/content  Cognition: Alert, Oriented  Evidence of Imminent Suicide Risk: No  Evidence of imminent homicide risk: No  Therapeutic Interventions: Cognitive clarification, Relapse prevention  Progress Toward Treatment Goal: Moderate improvement
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
## 2019-09-20 NOTE — BH THERAPY
Group Therapy Checklist  Attendance: Attended  Attendance Duration (min): (90)  Number of Participants: 11  Program/Group: Intensive Outpatient Program  Topics Covered: (Group Therapy)  Participation: Active verbal participation, Attentive. Pt stated that since starting Program and learning some tools that she is more patient and tolerant in her relationship with her father.   Affect/Mood Range: Flexible  Affect/Mood Display: Congruent w/content  Cognition: Oriented, Alert  Evidence of Imminent Suicide Risk: No  Evidence of imminent homicide risk: No  Therapeutic Interventions: Emotion clarification, Cognitive clarification  Progress Toward Treatment Goal: Moderate improvement

## 2019-09-23 ENCOUNTER — HOSPITAL ENCOUNTER (OUTPATIENT)
Dept: BEHAVIORAL HEALTH | Facility: MEDICAL CENTER | Age: 24
End: 2019-09-23
Attending: PSYCHIATRY & NEUROLOGY
Payer: COMMERCIAL

## 2019-09-23 ENCOUNTER — TELEPHONE (OUTPATIENT)
Dept: BEHAVIORAL HEALTH | Facility: MEDICAL CENTER | Age: 24
End: 2019-09-23

## 2019-09-23 DIAGNOSIS — F33.2 MDD (MAJOR DEPRESSIVE DISORDER), RECURRENT SEVERE, WITHOUT PSYCHOSIS (HCC): ICD-10-CM

## 2019-09-23 DIAGNOSIS — F33.1 MAJOR DEPRESSIVE DISORDER, RECURRENT EPISODE, MODERATE (HCC): ICD-10-CM

## 2019-09-23 PROCEDURE — 90834 PSYTX W PT 45 MINUTES: CPT

## 2019-09-23 PROCEDURE — 90853 GROUP PSYCHOTHERAPY: CPT | Performed by: MARRIAGE & FAMILY THERAPIST

## 2019-09-23 NOTE — BH THERAPY
Group Therapy Checklist  Attendance: Attended  Attendance Duration (min): (90)  Number of Participants: 11  Program/Group: Intensive Outpatient Program  Topics Covered: (Process Group)  Participation: Active verbal participation, Attentive, Open to feedback. Isa shared that she felt anger because her estranged  told her that he wanted to try and work it out now even though she had asked him many times to in the past and he would not.   Affect/Mood Range: Normal range  Affect/Mood Display: Congruent w/content  Cognition: Oriented, Alert  Evidence of Imminent Suicide Risk: No  Evidence of imminent homicide risk: No  Therapeutic Interventions: Emotion clarification, Cognitive clarification, Supportive psychotherapy  Progress Toward Treatment Goal: Mild improvement

## 2019-09-23 NOTE — CARE PLAN
"  Problem: Mood Instability Interfering with Adl’S  Goal: Stable mood and functioning  Outcome: PROGRESSING AS EXPECTED  Note:    [facility]  Therapy Progress Note    Patient Name: Isa Mcclure  Patient MRN: [id number]  Today's Date: 9/23/2019     Type of session:Individual psychotherapy  Length of session: 45 minutes  Persons in attendance:Patient    Subjective/New Info: She has had many stressors lately and her  has been asking her to reconsider and work on their relationship by going to couples counseling.  She feels sad and confused but feels that she is making the right decision to divorce him because she doesn't want to be hi \"mother\" or his slave and thinks he is not together enough to be a stable partner for her.  She \"loves\" him and wants to still be friends but she doesn't want to be  to him.  She said this has brought her closer to her father because he thinks she is doing the right thing too and is willing to help her pay for the divorce.  Her mother had surgery last week to get a pacemaker so Isa is having to help her with all her ADL's.  We went over her Self Compassion worksheet and she was able to write about how she sabotages herself and how she can write and say positive things about herself to build up her self esteem.  She said she realizes she has to work on loving herself before she can love anyone else.  She has been working on becoming healthier and has been eating better and sleeping better and walking when the kids are at school.  She has not been using substances and has not had any food restricting behavior.  She talked about thinking about getting a job and going back to school at some point and was encouraged to journal daily so she can write about her present and goals and plans for the future.    Objective/Observations:   Participation: Active verbal participation, Attentive, Engaged and Open to feedback   Grooming: Casual and Neat   Cognition: Alert " "and Fully Oriented   Eye contact: Good   Mood: Euthymic   Affect: Flexible and Full range   Thought process: Logical and Goal-directed   Speech: Rate within normal limits and Volume within normal limits   Other:     Diagnoses: No diagnosis found. MDD F33.1    Current risk:   SUICIDE: Not applicable   Homicide: Not applicable   Self-harm: Not applicable   Relapse: Low   Other:    Safety Plan reviewed? Not Indicated   If evidence of imminent risk is present, intervention/plan:     Therapeutic Intervention(s): Goal-setting, Interpersonal effectiveness skills, Leisure and recreation skills, Maladaptive behavior addressed, Positive behavior reinforced, Self-care skills, Stressors assessed and Treatment compliance addressed    Treatment Goal(s)/Objective(s) addressed: completed Self Compassion and is working on affirmations and gratitude list.     Progress toward Treatment Goals: Moderate improvement    Plan:  - \"Homework\" recommendation: She will journal daily and include 3 affirmations and 3 things she is grateful for and pick one self defeating thought to focus on changing to a compassionate thought.     Scarlett Baker R.N.  9/23/2019                                       "

## 2019-09-23 NOTE — BH THERAPY
Group Therapy Checklist  Attendance: Attended  Attendance Duration (min): (60)  Number of Participants: 10  Program/Group: Intensive Outpatient Program  Topics Covered: Relapse Prevention  Participation: Active verbal participation, Attentive  Affect/Mood Range: Flexible  Affect/Mood Display: Congruent w/content  Cognition: Oriented, Alert  Evidence of Imminent Suicide Risk: No  Evidence of imminent homicide risk: No  Therapeutic Interventions: Psychoeducation re: (Comment), Cognitive clarification  Progress Toward Treatment Goal: Moderate improvement

## 2019-09-24 NOTE — TELEPHONE ENCOUNTER
[facility]    Treatment Team Staffing    Patient Name: Isa Mcclure Program: IOP Date: 9/23/2019     Attendees: Scarlett Baker RN, MA, [facility]; Gaye Iniguez RNC; Rabia Reeder EdS, MFT, [facility]; Ana Michael CPC Intern; Dr. Eden Aguirre , Psychiatrist; Brendon Kearns PhD, [facility] Practice Supervisor    Patient's Progress toward Goals Listed on the Treatment Plan: progressing as expected     1. Client's Participation When in Attendance Was: Active in a Positive Way    2. Counselor's Evaluation of Client's Progress: Positive Movement    3. Patient is attending group and individual sessions and is progressing well toward the treatment goals: yes      YES NO   A. Relapse During Program []  [x]    B. Requires physician review [x]  []    C. Referral to program inappropriate []  [x]    D. Non compliance with Treatment Plan []  [x]    E. Early treatment termination (lack of attendance) []  [x]     []  []      Comments: Isa is progressing well and says she is learning a lot about coping and communicating.    Treatment Plan Review: - Continue Intensive Outpatient Program

## 2019-09-25 ENCOUNTER — HOSPITAL ENCOUNTER (OUTPATIENT)
Dept: BEHAVIORAL HEALTH | Facility: MEDICAL CENTER | Age: 24
End: 2019-09-25
Attending: PSYCHIATRY & NEUROLOGY
Payer: COMMERCIAL

## 2019-09-25 DIAGNOSIS — F33.2 MDD (MAJOR DEPRESSIVE DISORDER), RECURRENT SEVERE, WITHOUT PSYCHOSIS (HCC): ICD-10-CM

## 2019-09-25 PROCEDURE — 90853 GROUP PSYCHOTHERAPY: CPT

## 2019-09-25 NOTE — BH THERAPY
"Group Therapy Checklist  Attendance: Attended  Attendance Duration (min): (60)  Number of Participants: 9  Program/Group: Intensive Outpatient Program  Topics Covered: \"Pieces of Silence\"  Participation: Attentive  Affect/Mood Range: Normal range, Flexible  Affect/Mood Display: Congruent w/content  Cognition: Alert, Oriented  Evidence of Imminent Suicide Risk: No  Evidence of imminent homicide risk: No  Therapeutic Interventions: Cognitive clarification, Relapse prevention  Progress Toward Treatment Goal: Moderate improvement    "

## 2019-09-25 NOTE — BH THERAPY
Group Therapy Checklist  Attendance: Attended  Attendance Duration (min): (90)  Number of Participants: 9  Program/Group: Intensive Outpatient Program  Topics Covered: (Group Therapy)  Participation: Active verbal participation, Attentive.  Pt expressed some hope as her  said he will go to counseling and work on their marriage. They are  4 months.   Affect/Mood Range: Constricted  Affect/Mood Display: Congruent w/content  Cognition: Oriented, Alert  Evidence of Imminent Suicide Risk: No  Evidence of imminent homicide risk: No  Therapeutic Interventions: Emotion clarification, Cognitive clarification  Progress Toward Treatment Goal: Moderate improvement

## 2019-09-26 ENCOUNTER — HOSPITAL ENCOUNTER (OUTPATIENT)
Dept: BEHAVIORAL HEALTH | Facility: MEDICAL CENTER | Age: 24
End: 2019-09-26
Attending: PSYCHIATRY & NEUROLOGY
Payer: COMMERCIAL

## 2019-09-26 DIAGNOSIS — F33.1 MAJOR DEPRESSIVE DISORDER, RECURRENT EPISODE, MODERATE (HCC): ICD-10-CM

## 2019-09-26 PROCEDURE — 90853 GROUP PSYCHOTHERAPY: CPT | Performed by: MARRIAGE & FAMILY THERAPIST

## 2019-09-26 NOTE — BH THERAPY
Group Therapy Checklist  Attendance: Attended  Attendance Duration (min): (90)  Number of Participants: 8  Program/Group: Intensive Outpatient Program  Topics Covered: (Process Group)  Participation: Active verbal participation, Attentive, Supportive to other group members, Open to feedback. Isa shared that stoic is the only display of emotion that is not criticized by her father. Even happy is questioned and judged.   Affect/Mood Range: Normal range, Flexible  Affect/Mood Display: Congruent w/content  Cognition: Alert, Oriented  Evidence of Imminent Suicide Risk: No  Evidence of imminent homicide risk: No  Therapeutic Interventions: Supportive psychotherapy, Therapeutic metaphor, Emotion clarification  Progress Toward Treatment Goal: Mild improvement

## 2019-09-26 NOTE — BH THERAPY
Group Therapy Checklist  Attendance: Attended  Attendance Duration (min): (60)  Number of Participants: 8  Program/Group: Intensive Outpatient Program  Topics Covered: Dual Diagnosis  Participation: Active verbal participation, Attentive  Affect/Mood Range: Flexible  Affect/Mood Display: Congruent w/content  Cognition: Oriented, Alert  Evidence of Imminent Suicide Risk: No  Evidence of imminent homicide risk: No  Therapeutic Interventions: Emotion clarification, Cognitive clarification  Progress Toward Treatment Goal: Moderate improvement

## 2019-09-30 ENCOUNTER — TELEPHONE (OUTPATIENT)
Dept: BEHAVIORAL HEALTH | Facility: MEDICAL CENTER | Age: 24
End: 2019-09-30

## 2019-09-30 ENCOUNTER — HOSPITAL ENCOUNTER (OUTPATIENT)
Dept: BEHAVIORAL HEALTH | Facility: MEDICAL CENTER | Age: 24
End: 2019-09-30
Attending: PSYCHIATRY & NEUROLOGY
Payer: COMMERCIAL

## 2019-09-30 DIAGNOSIS — F33.2 MDD (MAJOR DEPRESSIVE DISORDER), RECURRENT SEVERE, WITHOUT PSYCHOSIS (HCC): ICD-10-CM

## 2019-09-30 DIAGNOSIS — F33.1 MAJOR DEPRESSIVE DISORDER, RECURRENT EPISODE, MODERATE (HCC): ICD-10-CM

## 2019-09-30 DIAGNOSIS — F15.11 METHAMPHETAMINE USE DISORDER, MILD, IN SUSTAINED REMISSION (HCC): ICD-10-CM

## 2019-09-30 PROCEDURE — 90853 GROUP PSYCHOTHERAPY: CPT | Performed by: MARRIAGE & FAMILY THERAPIST

## 2019-09-30 PROCEDURE — 90834 PSYTX W PT 45 MINUTES: CPT

## 2019-09-30 NOTE — BH THERAPY
Group Therapy Checklist  Attendance: Attended  Attendance Duration (min): (90)  Number of Participants: 8  Program/Group: Intensive Outpatient Program  Topics Covered: (process group)  Participation: Active verbal participation, Attentive, Open to feedback, Supportive to other group members  Affect/Mood Range: Normal range, Flexible  Affect/Mood Display: Congruent w/content  Cognition: Alert, Oriented  Evidence of Imminent Suicide Risk: No  Evidence of imminent homicide risk: No  Therapeutic Interventions: Emotion clarification, Supportive psychotherapy  Progress Toward Treatment Goal: Moderate improvement  Isa processed feeling scared. She feels she is still depressed and reports a weekend where she stayed in bed, isolating. She is taking action by making plans with peers to take their children to the park next weekend. Receptive to peer support
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
## 2019-09-30 NOTE — BH THERAPY
Group Therapy Checklist  Attendance Duration (min): (60)  Number of Participants: 8  Program/Group: Intensive Outpatient Program  Topics Covered: Recovery Planning  Participation: Active verbal participation, Attentive  Affect/Mood Range: Flexible  Affect/Mood Display: Congruent w/content  Cognition: Oriented, Alert  Evidence of Imminent Suicide Risk: No  Evidence of imminent homicide risk: No  Therapeutic Interventions: Psychoeducation re: (Comment), Cognitive clarification  Progress Toward Treatment Goal: Moderate improvement

## 2019-10-01 NOTE — TELEPHONE ENCOUNTER
[facility]    Treatment Team Staffing    Patient Name: Isa Mcclure Program: IOP Date: 9/30/2019     Attendees: Scarlett Baker RN, [facility]; Gaye Iniguez RNC; Rabia Clemens, MFT, [facility]; Dr. Brendon Kearns PhD; Dr. Eden Aguirre, Psychiatrist    Patient's Progress toward Goals Listed on the Treatment Plan: progressing as expected  1. Client's Participation When in Attendance Was: Active in a Positive Way    2. Counselor's Evaluation of Client's Progress: Positive Movement    3. Patient is attending group and individual sessions and is progressing well toward the treatment goals: yes      YES NO   A. Relapse During Program []  [x]    B. Requires physician review [x]  []    C. Referral to program inappropriate []  [x]    D. Non compliance with Treatment Plan []  [x]    E. Early treatment termination (lack of attendance) []   no   F. Other:  []  []      Comments: progressing as expected, feeling less depressed    Treatment Plan Review: - Continue Intensive Outpatient Program

## 2019-10-02 ENCOUNTER — HOSPITAL ENCOUNTER (OUTPATIENT)
Dept: BEHAVIORAL HEALTH | Facility: MEDICAL CENTER | Age: 24
End: 2019-10-02
Attending: PSYCHIATRY & NEUROLOGY
Payer: COMMERCIAL

## 2019-10-02 DIAGNOSIS — F33.2 MDD (MAJOR DEPRESSIVE DISORDER), RECURRENT SEVERE, WITHOUT PSYCHOSIS (HCC): ICD-10-CM

## 2019-10-02 PROCEDURE — 90853 GROUP PSYCHOTHERAPY: CPT

## 2019-10-02 NOTE — BH THERAPY
Group Therapy Checklist  Attendance: Attended  Attendance Duration (min): (60)  Number of Participants: 9  Program/Group: Intensive Outpatient Program  Topics Covered: Cognitive distortions  Participation: Active verbal participation, Attentive  Affect/Mood Range: Normal range, Flexible  Affect/Mood Display: Congruent w/content  Cognition: Alert, Oriented  Evidence of Imminent Suicide Risk: No  Evidence of imminent homicide risk: No  Therapeutic Interventions: Cognitive clarification, Emotion clarification, Behavioral activation  Progress Toward Treatment Goal: Mild improvement

## 2019-10-02 NOTE — BH THERAPY
Group Therapy Checklist  Attendance: Attended  Attendance Duration (min): (90)  Number of Participants: 8  Program/Group: Intensive Outpatient Program  Topics Covered: (Group Therapy)  Participation: Active verbal participation, Attentive. Pt seemed more hopeful and positive about her recovery from depression as her  has joined in her support and is addressing his own mental wellness. The divorce is off.   Affect/Mood Range: Flexible  Affect/Mood Display: Congruent w/content  Cognition: Oriented, Alert  Evidence of Imminent Suicide Risk: No  Evidence of imminent homicide risk: No  Therapeutic Interventions: Emotion clarification, Cognitive clarification  Progress Toward Treatment Goal: Moderate improvement

## 2019-10-04 ENCOUNTER — HOSPITAL ENCOUNTER (OUTPATIENT)
Dept: BEHAVIORAL HEALTH | Facility: MEDICAL CENTER | Age: 24
End: 2019-10-04
Attending: PSYCHIATRY & NEUROLOGY
Payer: COMMERCIAL

## 2019-10-04 VITALS
BODY MASS INDEX: 29.49 KG/M2 | DIASTOLIC BLOOD PRESSURE: 92 MMHG | HEART RATE: 96 BPM | SYSTOLIC BLOOD PRESSURE: 148 MMHG | WEIGHT: 146 LBS

## 2019-10-04 DIAGNOSIS — F33.2 MDD (MAJOR DEPRESSIVE DISORDER), RECURRENT SEVERE, WITHOUT PSYCHOSIS (HCC): ICD-10-CM

## 2019-10-04 DIAGNOSIS — F43.10 PTSD (POST-TRAUMATIC STRESS DISORDER): ICD-10-CM

## 2019-10-04 DIAGNOSIS — F15.11 METHAMPHETAMINE USE DISORDER, MILD, IN SUSTAINED REMISSION (HCC): ICD-10-CM

## 2019-10-04 PROCEDURE — 90853 GROUP PSYCHOTHERAPY: CPT

## 2019-10-04 PROCEDURE — 99214 OFFICE O/P EST MOD 30 MIN: CPT | Performed by: PSYCHIATRY & NEUROLOGY

## 2019-10-04 NOTE — BH THERAPY
Group Therapy Checklist  Attendance: Attended  Attendance Duration (min): (60)  Number of Participants: 12  Program/Group: Intensive Outpatient Program  Topics Covered: ACT conept intro  Participation: Active verbal participation, Attentive  Affect/Mood Range: Normal range, Flexible  Affect/Mood Display: Congruent w/content  Cognition: Alert, Oriented  Evidence of Imminent Suicide Risk: No  Evidence of imminent homicide risk: No  Therapeutic Interventions: Values clarification, Cognitive clarification, Mindfulness exercise  Progress Toward Treatment Goal: Moderate improvement
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
## 2019-10-04 NOTE — BH THERAPY
Group Therapy Checklist  Attendance: Attended  Attendance Duration (min): (90)  Program/Group: Intensive Outpatient Program  Topics Covered: (Group Therapy)  Participation: Active verbal participation, Attentive. Pt sad and felt betrayed as she processed that  She found out that her  has been cheating on her and lying.  She is sorting out what to do next in their relationship.  Affect/Mood Range: Constricted  Affect/Mood Display: Congruent w/content, Sad  Cognition: Oriented, Alert  Evidence of Imminent Suicide Risk: No  Evidence of imminent homicide risk: No  Therapeutic Interventions: Emotion clarification, Cognitive clarification  Progress Toward Treatment Goal: Moderate improvement

## 2019-10-07 ENCOUNTER — HOSPITAL ENCOUNTER (OUTPATIENT)
Dept: BEHAVIORAL HEALTH | Facility: MEDICAL CENTER | Age: 24
End: 2019-10-07
Attending: PSYCHIATRY & NEUROLOGY
Payer: COMMERCIAL

## 2019-10-07 DIAGNOSIS — F33.2 MDD (MAJOR DEPRESSIVE DISORDER), RECURRENT SEVERE, WITHOUT PSYCHOSIS (HCC): ICD-10-CM

## 2019-10-07 DIAGNOSIS — F33.1 MAJOR DEPRESSIVE DISORDER, RECURRENT EPISODE, MODERATE (HCC): ICD-10-CM

## 2019-10-07 PROCEDURE — 90832 PSYTX W PT 30 MINUTES: CPT

## 2019-10-07 PROCEDURE — 90853 GROUP PSYCHOTHERAPY: CPT | Performed by: MARRIAGE & FAMILY THERAPIST

## 2019-10-07 NOTE — CARE PLAN
[facility]  Therapy Progress Note    Patient Name: Isa Mcclure  Patient MRN: [id number]  Today's Date: 10/7/2019     Type of session:Individual psychotherapy  Length of session: 30 minutes  Persons in attendance:Patient    Subjective/New Info: individual session. Isa verbalized her relapse prevention plan. She feels she has gained insight through the process groups and will continue group with the Wednesday evening group starting 10/16. Isa has a therapist at [facility] whom she sees twice a month and she will return to them. She wants to attend [facility] on Friday night at 6:00, she is interested in recovery yoga as well. Isa reports her relationship with her parents has improved, largely due to her improved attitude and assertive communication; she felt she wasn't always respectful and direct in her expression with them. She is going to Voodoo with them on Sunday and her children are enjoying the weekly activity as well. In her relationships: she and  tried to reconcile but it's not going to work out now. Although the father of her children is in  retirement, he has turned things around, and she considers him a big part of her support network. Her brother is her biggest support. 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 She got together with her peers over the weekend and everyone felt supported and their children met new friends. She feels optimistic and has been excited to use the tools of the program. She feels her mood is stabilizing and her communication is more effective.     Objective/Observations:   Participation: Active verbal participation, Attentive, Engaged and Open to feedback   Grooming: Good, Casual and Neat   Cognition: Alert and Fully Oriented   Eye contact: Good   Mood: Euthymic   Affect: Flexible, Full range and Congruent with content   Thought process: Logical and Goal-directed   Speech: Rate within normal limits and Volume  within normal limits   Other:     Diagnoses:   1. Major depressive disorder, recurrent episode, moderate (HCC)         Current risk:   SUICIDE: Low   Homicide: Low   Self-harm: Low   Relapse: Low   Other:    Safety Plan reviewed? Not Indicated   If evidence of imminent risk is present, intervention/plan:     Therapeutic Intervention(s): Behavior:  Behavioral plan developed/modified, Clarify:  Clarify feelings, Communication skills, Distress tolerance skills, Leisure and recreation skills, Parenting/familial roles addressed, Review treatment plan, Self-care skills, Socialization and Supportive psychotherapy    Treatment Goal(s)/Objective(s) addressed: verbalize relapse prevention plan and goals.      Progress toward Treatment Goals: Significant improvement    Plan:  - Continue Intensive Outpatient Program  - Next appointment scheduled:  10/9/2019  - Transition toward termination  - Patient is in agreement with the above plan:  YES    Gaye Iniguez R.N.  10/7/2019

## 2019-10-07 NOTE — BH THERAPY
Group Therapy Checklist  Attendance: Attended  Attendance Duration (min): (60)  Number of Participants: 10  Program/Group: Intensive Outpatient Program  Topics Covered: Assertiveness  Participation: Limited verbal participation, Attentive  Affect/Mood Range: Flexible  Affect/Mood Display: Congruent w/content  Cognition: Oriented, Alert  Evidence of Imminent Suicide Risk: No  Evidence of imminent homicide risk: No  Therapeutic Interventions: Psychoeducation re: (Comment), Cognitive clarification  Progress Toward Treatment Goal: Moderate improvement

## 2019-10-09 ENCOUNTER — HOSPITAL ENCOUNTER (OUTPATIENT)
Dept: BEHAVIORAL HEALTH | Facility: MEDICAL CENTER | Age: 24
End: 2019-10-09
Attending: PSYCHIATRY & NEUROLOGY
Payer: COMMERCIAL

## 2019-10-09 DIAGNOSIS — F33.2 MDD (MAJOR DEPRESSIVE DISORDER), RECURRENT SEVERE, WITHOUT PSYCHOSIS (HCC): ICD-10-CM

## 2019-10-09 PROCEDURE — 90853 GROUP PSYCHOTHERAPY: CPT

## 2019-10-09 NOTE — BH THERAPY
Group Therapy Checklist  Attendance: Attended  Attendance Duration (min): (90)  Number of Participants: 12  Program/Group: Intensive Outpatient Program  Topics Covered: (Group Therapy)  Participation: Limited verbal participation, Attentive. Pt expressed conor over meeting a female peer and from group with her kids over the weekend for a play date.   Affect/Mood Range: Flexible  Affect/Mood Display: Congruent w/content  Cognition: Oriented, Alert  Evidence of Imminent Suicide Risk: No  Evidence of imminent homicide risk: No  Therapeutic Interventions: Emotion clarification, Cognitive clarification  Progress Toward Treatment Goal: Moderate improvement

## 2019-10-09 NOTE — BH THERAPY
Group Therapy Checklist  Attendance: Attended  Attendance Duration (min): (60)  Number of Participants: 11  Program/Group: Intensive Outpatient Program  Topics Covered: Chalk talk  Participation: Attentive  Affect/Mood Range: Normal range, Flexible  Affect/Mood Display: Congruent w/content  Cognition: Alert, Oriented  Evidence of Imminent Suicide Risk: No  Evidence of imminent homicide risk: No  Therapeutic Interventions: Cognitive clarification, Relapse prevention  Progress Toward Treatment Goal: Moderate improvement

## 2019-10-11 ENCOUNTER — TELEPHONE (OUTPATIENT)
Dept: BEHAVIORAL HEALTH | Facility: MEDICAL CENTER | Age: 24
End: 2019-10-11

## 2019-10-11 ENCOUNTER — HOSPITAL ENCOUNTER (OUTPATIENT)
Dept: BEHAVIORAL HEALTH | Facility: MEDICAL CENTER | Age: 24
End: 2019-10-11
Attending: PSYCHIATRY & NEUROLOGY
Payer: COMMERCIAL

## 2019-10-11 DIAGNOSIS — F33.2 MDD (MAJOR DEPRESSIVE DISORDER), RECURRENT SEVERE, WITHOUT PSYCHOSIS (HCC): ICD-10-CM

## 2019-10-11 PROCEDURE — 90853 GROUP PSYCHOTHERAPY: CPT

## 2019-10-11 NOTE — ADDENDUM NOTE
Encounter addended by: Gaye Iniguez R.N. on: 10/11/2019 4:00 PM   Actions taken: Care Plan problems brought forward, Care Plan progress modified, Care Plan modified

## 2019-10-11 NOTE — TELEPHONE ENCOUNTER
[facility]  TRANSFER/DISCHARGE SUMMARY FORM    HHPI / SCP: no  Other Ins.: [employer]     Patient Name: Isa Mcclure  Admission Date: 19  Level of Care Attended:  Intens.OP : 1994  Transfer/Discharge Date: MRN: [id number]  10/11/19       SIGNIFICANT FINDINGS/CLINICAL IMPRESSION:   DSM Codes:   IOP    ICD10 Codes:  F33.2    Additional problems identified via assessment: marital difficulties, living with parents    Treatment Components in Which Patient Participated (check all that apply):  Education group(s), 1:1 teaching/therapy, Group Therapy and 12-Step Group(s)    Summary of Course of Treatment: Active participation all education forums, process groups and individual counseling sessions.    Condition at Time of Transfer/Discharge: Met treatment goals.    [x] Medications Reviewed with Copy to Patient    Referred to: Refer to [facility]: Outpatient Therapy and Outpatient Medication Management     Patient is in agreement with discharge plan: yes    Gaye Iniguez R.N.

## 2019-10-11 NOTE — BH THERAPY
Group Therapy Checklist  Attendance: Attended  Attendance Duration (min): (90)  Number of Participants: 12  Program/Group: Intensive Outpatient Program  Topics Covered: (Group Therapy)  Participation: Active verbal participation, Attentive. Pt resilient through her Program and she got a job.  She has good self awareness and will attend [facility] on Fridays to stay connected.  Affect/Mood Range: Flexible  Affect/Mood Display: Congruent w/content  Cognition: Oriented, Alert  Evidence of Imminent Suicide Risk: No  Evidence of imminent homicide risk: No  Therapeutic Interventions: Emotion clarification, Cognitive clarification  Progress Toward Treatment Goal: Moderate improvement

## 2019-10-14 RX ORDER — ESCITALOPRAM OXALATE 10 MG/1
10 TABLET ORAL EVERY MORNING
Qty: 30 TAB | Refills: 0 | Status: SHIPPED | OUTPATIENT
Start: 2019-10-14 | End: 2019-10-22

## 2019-10-21 ENCOUNTER — OFFICE VISIT (OUTPATIENT)
Dept: URGENT CARE | Facility: PHYSICIAN GROUP | Age: 24
End: 2019-10-21
Payer: COMMERCIAL

## 2019-10-21 VITALS
RESPIRATION RATE: 16 BRPM | HEART RATE: 90 BPM | OXYGEN SATURATION: 98 % | TEMPERATURE: 98.1 F | SYSTOLIC BLOOD PRESSURE: 118 MMHG | WEIGHT: 152 LBS | DIASTOLIC BLOOD PRESSURE: 70 MMHG | HEIGHT: 59 IN | BODY MASS INDEX: 30.64 KG/M2

## 2019-10-21 DIAGNOSIS — L02.91 ABSCESS OF MULTIPLE SITES: ICD-10-CM

## 2019-10-21 DIAGNOSIS — Z86.14 HISTORY OF MRSA INFECTION: ICD-10-CM

## 2019-10-21 PROCEDURE — 99214 OFFICE O/P EST MOD 30 MIN: CPT | Performed by: FAMILY MEDICINE

## 2019-10-21 RX ORDER — CLINDAMYCIN HYDROCHLORIDE 300 MG/1
300 CAPSULE ORAL 4 TIMES DAILY
Qty: 28 CAP | Refills: 0 | Status: SHIPPED | OUTPATIENT
Start: 2019-10-21 | End: 2019-10-28

## 2019-10-21 ASSESSMENT — PAIN SCALES - GENERAL: PAINLEVEL: 6=MODERATE PAIN

## 2019-10-21 NOTE — PROGRESS NOTES
"Subjective:      Isa Mcclure is a 24 y.o. female who presents with Abscess (Pt has multiple abscesses x 2 days, left lower leg, left armpit area, right armpit area, & face)            2 days multiple abscess. R shoulder, L upper back, and L face has drained. L leg abscess has not drained. Sx are moderate to severe. +PMH MRSA. Subjective fever but no measured temp. she notes that she has done well on clindamycin in the past.  +fatigue. No other aggravating or alleviating factors.        Review of Systems   Constitutional: Negative for malaise/fatigue and weight loss.   Eyes: Negative for discharge and redness.   Gastrointestinal: Negative for nausea and vomiting.   Musculoskeletal: Negative for joint pain and myalgias.   Skin: Negative for itching and rash.     .  Medications, Allergies, and current problem list reviewed today in Epic       Objective:     /70 (BP Location: Left arm, Patient Position: Sitting, BP Cuff Size: Adult)   Pulse 90   Temp 36.7 °C (98.1 °F) (Temporal)   Resp 16   Ht 1.499 m (4' 11\")   Wt 68.9 kg (152 lb)   SpO2 98%   BMI 30.70 kg/m²      Physical Exam   Constitutional: She is oriented to person, place, and time. She appears well-developed and well-nourished. No distress.   HENT:   Head: Normocephalic and atraumatic.   Eyes: Conjunctivae are normal.   Cardiovascular: Normal rate, regular rhythm and normal heart sounds.   No murmur heard.  Pulmonary/Chest: Effort normal and breath sounds normal. She has no wheezes.   Neurological: She is alert and oriented to person, place, and time.   Skin: Skin is warm and dry.   Multiple small abscesses that have drained spontaneously.  This includes left face, left upper back, and right chest.  No areas of significant fluctuance.               Assessment/Plan:     1. Abscess of multiple sites  clindamycin (CLEOCIN) 300 MG Cap   2. History of MRSA infection  clindamycin (CLEOCIN) 300 MG Cap     Differential diagnosis, natural history, " supportive care, and indications for immediate follow-up discussed at length.

## 2019-10-22 ENCOUNTER — OFFICE VISIT (OUTPATIENT)
Dept: BEHAVIORAL HEALTH | Facility: CLINIC | Age: 24
End: 2019-10-22
Payer: COMMERCIAL

## 2019-10-22 VITALS
BODY MASS INDEX: 31.19 KG/M2 | WEIGHT: 154.4 LBS | DIASTOLIC BLOOD PRESSURE: 76 MMHG | HEART RATE: 75 BPM | SYSTOLIC BLOOD PRESSURE: 105 MMHG

## 2019-10-22 DIAGNOSIS — F33.1 MODERATE EPISODE OF RECURRENT MAJOR DEPRESSIVE DISORDER (HCC): ICD-10-CM

## 2019-10-22 DIAGNOSIS — F43.10 PTSD (POST-TRAUMATIC STRESS DISORDER): ICD-10-CM

## 2019-10-22 PROBLEM — F33.2 MDD (MAJOR DEPRESSIVE DISORDER), RECURRENT SEVERE, WITHOUT PSYCHOSIS (HCC): Status: RESOLVED | Noted: 2019-09-13 | Resolved: 2019-10-22

## 2019-10-22 PROCEDURE — 99213 OFFICE O/P EST LOW 20 MIN: CPT | Mod: GC | Performed by: PSYCHIATRY & NEUROLOGY

## 2019-10-22 RX ORDER — ESCITALOPRAM OXALATE 20 MG/1
20 TABLET ORAL DAILY
Qty: 30 TAB | Refills: 2 | Status: SHIPPED | OUTPATIENT
Start: 2019-10-22 | End: 2020-06-19
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
## 2019-10-29 ENCOUNTER — HOSPITAL ENCOUNTER (OUTPATIENT)
Facility: MEDICAL CENTER | Age: 24
End: 2019-10-29
Attending: FAMILY MEDICINE
Payer: COMMERCIAL

## 2019-10-29 ENCOUNTER — OFFICE VISIT (OUTPATIENT)
Dept: MEDICAL GROUP | Facility: LAB | Age: 24
End: 2019-10-29
Payer: COMMERCIAL

## 2019-10-29 VITALS
TEMPERATURE: 97.8 F | BODY MASS INDEX: 30.24 KG/M2 | SYSTOLIC BLOOD PRESSURE: 112 MMHG | DIASTOLIC BLOOD PRESSURE: 80 MMHG | RESPIRATION RATE: 16 BRPM | WEIGHT: 150 LBS | HEART RATE: 74 BPM | HEIGHT: 59 IN | OXYGEN SATURATION: 98 %

## 2019-10-29 DIAGNOSIS — Z23 NEED FOR VACCINATION: ICD-10-CM

## 2019-10-29 DIAGNOSIS — L30.9 ECZEMA, UNSPECIFIED TYPE: ICD-10-CM

## 2019-10-29 DIAGNOSIS — Z12.4 SCREENING FOR CERVICAL CANCER: ICD-10-CM

## 2019-10-29 DIAGNOSIS — Z01.419 WELL WOMAN EXAM WITH ROUTINE GYNECOLOGICAL EXAM: ICD-10-CM

## 2019-10-29 PROCEDURE — 88175 CYTOPATH C/V AUTO FLUID REDO: CPT

## 2019-10-29 PROCEDURE — 90716 VAR VACCINE LIVE SUBQ: CPT | Performed by: FAMILY MEDICINE

## 2019-10-29 PROCEDURE — 90686 IIV4 VACC NO PRSV 0.5 ML IM: CPT | Performed by: FAMILY MEDICINE

## 2019-10-29 PROCEDURE — 90472 IMMUNIZATION ADMIN EACH ADD: CPT | Performed by: FAMILY MEDICINE

## 2019-10-29 PROCEDURE — 90471 IMMUNIZATION ADMIN: CPT | Performed by: FAMILY MEDICINE

## 2019-10-29 PROCEDURE — 99395 PREV VISIT EST AGE 18-39: CPT | Mod: 25 | Performed by: FAMILY MEDICINE

## 2019-10-29 PROCEDURE — 90651 9VHPV VACCINE 2/3 DOSE IM: CPT | Performed by: FAMILY MEDICINE

## 2019-10-29 RX ORDER — BETAMETHASONE DIPROPIONATE 0.5 MG/G
CREAM TOPICAL
Qty: 60 G | Refills: 1 | Status: SHIPPED | OUTPATIENT
Start: 2019-10-29 | End: 2019-11-19

## 2019-10-29 ASSESSMENT — ENCOUNTER SYMPTOMS
MYALGIAS: 0
VOMITING: 0
EYE DISCHARGE: 0
WEIGHT LOSS: 0
EYE REDNESS: 0
NAUSEA: 0

## 2019-10-29 NOTE — PROGRESS NOTES
SUBJECTIVE:   Chief Complaint   Patient presents with   • Eczema   • Gynecologic Exam       24 y.o. female for annual routine gynecologic exam    OB History    Para Term  AB Living   3 2 0 0 0 2   SAB TAB Ectopic Molar Multiple Live Births   0 0 0 0 0 2      Social History     Substance and Sexual Activity   Sexual Activity Yes   • Partners: Male       No significant bloating/fluid retention, pelvic pain, or dyspareunia. No vaginal discharge  She does perform regular self breast exams.  No breast tenderness, mass, nipple discharge, changes in size or contour, or abnormal cyclic discomfort.        ROS:    No urinary tract symptoms, no incontinence.   No abdominal pain, change in bowel habits, black or bloody stools.    No unusual headaches, no visual changes, menstrual migraines   No prolonged cough. No dyspnea or chest pain on exertion.  No depression, labile mood, anxiety ,libido changes, insomnia.  No new/concerning skin lesions, she has had a flare of her eczema on her hands.  She ran out of the previous ointment she had.  She did not like how it made her hands feel sticky        Social History     Tobacco Use   • Smoking status: Former Smoker     Packs/day: 0.50     Years: 6.00     Pack years: 3.00     Types: Cigarettes     Last attempt to quit: 2015     Years since quittin.2   • Smokeless tobacco: Never Used   Substance Use Topics   • Alcohol use: Not Currently     Frequency: 2-4 times a month   • Drug use: No     Comment: hx of meth and cannabis use, sober since 2018       Patient Active Problem List    Diagnosis Date Noted   • Methamphetamine use disorder, mild, in sustained remission (HCC) 2019   • Amenorrhea 2019   • Migraine without aura and without status migrainosus, not intractable 2019   • Eczema 2019   • Obesity (BMI 30-39.9) 2018   • Moderate episode of recurrent major depressive disorder (HCC) 2017   • PTSD (post-traumatic stress disorder)  "01/13/2017       Past Medical History:   Diagnosis Date   • Allergy    • Anxiety     Used Klonopin   • Asthma    • Depression    • Drug abuse, IV (HCC)    • Migraine    • Substance abuse (HCC)     Methamphetamine and MJ - clean 4 1/2 years     Past Surgical History:   Procedure Laterality Date   • CHOLECYSTECTOMY           Family History   Problem Relation Age of Onset   • Hypertension Mother    • Diabetes Mother    • Psychiatric Illness Mother    • Hyperlipidemia Mother    • Heart Disease Mother 60        MI   • Hypertension Father    • Hyperlipidemia Father    • Psychiatric Illness Brother         depression     Family Status   Relation Name Status   • Mo  Alive   • Fa  Alive   • Bro  Alive   • Sis  Alive   • MAunt  Alive   • Bro  Alive   • Bro  Alive   • Sis  Alive   • Sis  Alive   • Son  Alive, age 3y   • Jensen  Alive, age 4y         Current medicines (including changes today)  Current Outpatient Medications   Medication Sig Dispense Refill   • betamethasone dipropionate (DIPROLENE) 0.05 % Cream Apply thin film to affected area BID prn 60 g 1   • escitalopram (LEXAPRO) 20 MG tablet Take 1 Tab by mouth every day. 30 Tab 2   • Melatonin 1 MG Tab Take  by mouth.     • mirtazapine (REMERON) 7.5 MG tablet Take 1 Tab by mouth every bedtime. 30 Tab 0   • ibuprofen (MOTRIN) 200 MG Tab Take 400 mg by mouth every 6 hours as needed for Mild Pain.       No current facility-administered medications for this visit.            Allergies: Bactrim ds and Hydrocodone     Preventive Care:  Health Maintenance Topics with due status: Overdue       Topic Date Due    IMM VARICELLA (CHICKENPOX) VACCINE 06/23/2007    IMM HPV VACCINE 06/23/2009    PAP SMEAR 06/23/2015    IMM INFLUENZA 09/01/2019       OBJECTIVE:   /80 (BP Location: Right arm, Patient Position: Sitting, BP Cuff Size: Adult)   Pulse 74   Temp 36.6 °C (97.8 °F) (Temporal)   Resp 16   Ht 1.499 m (4' 11\")   Wt 68 kg (150 lb)   LMP 10/06/2019   SpO2 98%   BMI 30.30 " kg/m²   Body mass index is 30.3 kg/m².      Gen: Well developed, well nourished in no acute distress.   Skin: Pink, warm, and dry.  Erythematous scaling lesions on both palmar surface and dorsal surface of hands  Eyes: conjunctiva non-injected, sclera non-icteric. EOMs intact.   Nasal mucosa without edema nor erythema. No facial tenderness  Ears:Pinna normal. TM pearly gray.   Nose: Nares patent.  No discharge.  Oral mucous membranes pink and moist with no lesions.  Neck: Supple, trachea midline. No adenopathy or masses in the neck or supraclavicular regions. No thyromegaly.  Lungs: Effort is normal. Clear to auscultation bilaterally with good excursion.  CV: regular rate and rhythm. No murmur.  Abdomen: soft, nontender, + BS. No HSM.  No CVAT  Ext: no edema, color normal, vascularity normal, temperature normal  Alert and oriented Eye contact is good, speech goal directed, affect calm    Breast Exam: Performed with instruction during examination. No axillary lymphadenopathy, no skin changes, no dominant masses. No nipple retraction  Pelvic Exam:  Normal external genitalia with no lesions. Normal vaginal mucosa with normal rugation and no discharge. Cervix with no visible lesions. No cervical motion tenderness. Uterus is normal sized with no masses. No adnexal tenderness or enlargement appreciated. Pap is obtained and the specimen was sent to lab  .    <ASSESSMENT and PLAN>  1. Well woman exam with routine gynecological exam  Routine anticipatory guidance.  No special services needed at this time.  Health counseling as below    2. Need for vaccination  Updated  - Influenza Vaccine Quad Injection (PF)  - 9VHPV Vaccine 2-3 Dose IM  - Varicella Vaccine SQ    3. Screening for cervical cancer  Obtained.  Await result  - THINPREP PAP,REFLEX HPV ON ASC-US ONLY; Future    4. Eczema, unspecified type  Betamethasone as directed.  Patient education materials given.  - betamethasone dipropionate (DIPROLENE) 0.05 % Cream; Apply  thin film to affected area BID prn  Dispense: 60 g; Refill: 1      Discussed  breast self exam, STD prevention, family planning choices, adequate intake of calcium and vitamin D, diet and exercise     Return in about 1 year (around 10/29/2020).

## 2019-10-30 DIAGNOSIS — Z12.4 SCREENING FOR CERVICAL CANCER: ICD-10-CM

## 2019-10-30 LAB
AMBIGUOUS DTTM AMBI4: NORMAL
CYTOLOGY REG CYTOL: NORMAL

## 2019-11-15 ENCOUNTER — APPOINTMENT (OUTPATIENT)
Dept: BEHAVIORAL HEALTH | Facility: CLINIC | Age: 24
End: 2019-11-15
Payer: COMMERCIAL

## 2019-11-18 ENCOUNTER — PATIENT MESSAGE (OUTPATIENT)
Dept: MEDICAL GROUP | Facility: LAB | Age: 24
End: 2019-11-18

## 2019-11-19 RX ORDER — BETAMETHASONE DIPROPIONATE 0.05 %
OINTMENT (GRAM) TOPICAL
Qty: 1 TUBE | Refills: 3 | Status: SHIPPED | OUTPATIENT
Start: 2019-11-19 | End: 2019-12-12

## 2019-12-12 ENCOUNTER — OFFICE VISIT (OUTPATIENT)
Dept: URGENT CARE | Facility: MEDICAL CENTER | Age: 24
End: 2019-12-12
Payer: MEDICAID

## 2019-12-12 VITALS
OXYGEN SATURATION: 99 % | WEIGHT: 156 LBS | TEMPERATURE: 98 F | SYSTOLIC BLOOD PRESSURE: 130 MMHG | RESPIRATION RATE: 18 BRPM | BODY MASS INDEX: 31.51 KG/M2 | DIASTOLIC BLOOD PRESSURE: 78 MMHG | HEART RATE: 96 BPM

## 2019-12-12 DIAGNOSIS — H66.001 ACUTE SUPPURATIVE OTITIS MEDIA OF RIGHT EAR WITHOUT SPONTANEOUS RUPTURE OF TYMPANIC MEMBRANE, RECURRENCE NOT SPECIFIED: ICD-10-CM

## 2019-12-12 DIAGNOSIS — J45.41 MODERATE PERSISTENT ASTHMA WITH EXACERBATION: ICD-10-CM

## 2019-12-12 DIAGNOSIS — J01.40 ACUTE NON-RECURRENT PANSINUSITIS: ICD-10-CM

## 2019-12-12 DIAGNOSIS — J40 BRONCHITIS: Primary | ICD-10-CM

## 2019-12-12 DIAGNOSIS — J06.9 URI WITH COUGH AND CONGESTION: ICD-10-CM

## 2019-12-12 PROCEDURE — 99214 OFFICE O/P EST MOD 30 MIN: CPT | Performed by: PHYSICIAN ASSISTANT

## 2019-12-12 RX ORDER — AMOXICILLIN AND CLAVULANATE POTASSIUM 875; 125 MG/1; MG/1
1 TABLET, FILM COATED ORAL 2 TIMES DAILY
Qty: 14 TAB | Refills: 0 | Status: SHIPPED | OUTPATIENT
Start: 2019-12-12 | End: 2019-12-19

## 2019-12-12 RX ORDER — DEXTROMETHORPHAN HYDROBROMIDE AND PROMETHAZINE HYDROCHLORIDE 15; 6.25 MG/5ML; MG/5ML
5 SYRUP ORAL EVERY 4 HOURS PRN
Qty: 120 ML | Refills: 0 | Status: SHIPPED | OUTPATIENT
Start: 2019-12-12 | End: 2020-06-19

## 2019-12-12 RX ORDER — METHYLPREDNISOLONE 4 MG/1
TABLET ORAL
Qty: 21 TAB | Refills: 0 | Status: SHIPPED | OUTPATIENT
Start: 2019-12-12 | End: 2020-06-19

## 2019-12-12 NOTE — LETTER
December 12, 2019       Patient: Isa Mcclure   YOB: 1995   Date of Visit: 12/12/2019         To Whom It May Concern:    It is my medical opinion that Isa Mcclure may be excused from work for the dates of 12/12/19-12/14/19.      If you have any questions or concerns, please don't hesitate to call [phone]          Sincerely,          Navdeep Daniels P.A.-C.  Electronically Signed

## 2019-12-13 NOTE — PROGRESS NOTES
Subjective:   Pt is a 24 y.o. female who presents with Sore Throat (cough , lost voice , chest and nasal congestion x4-5 days ago)            HPI  This is a new problem. PT presents to  clinic today complaining of sore throat,  pressure in ears, worsening asthma, cough, fatigue, runny nose, wheezing and SOB. PT denies CP, NVD, abdominal pain, joint pain. PT states these symptoms began around 4-5 days ago. PT states the pain is a 8/10 with coughing fits, aching in nature and worse at night. Pt has not taken any RX medications for this condition. The pt's medication list, problem list, and allergies have been evaluated and reviewed during today's visit.    PMH:  Past Medical History:   Diagnosis Date   • Allergy    • Anxiety     Used Klonopin   • Asthma    • Depression    • Drug abuse, IV (HCC)    • Migraine    • Substance abuse ([facility])     Methamphetamine and MJ - clean 4 1/2 years       PSH:  Past Surgical History:   Procedure Laterality Date   • CHOLECYSTECTOMY         Fam Hx:    family history includes Diabetes in her mother; Heart Disease (age of onset: 60) in her mother; Hyperlipidemia in her father and mother; Hypertension in her father and mother; Psychiatric Illness in her brother and mother.  Family Status   Relation Name Status   • Mo  Alive   • Fa  Alive   • Bro  Alive   • Sis  Alive   • MAunt  Alive   • Bro  Alive   • Bro  Alive   • Sis  Alive   • Sis  Alive   • Son  Alive, age 3y   • Jensen  Alive, age 4y       Soc HX:  Social History     Socioeconomic History   • Marital status:      Spouse name: Not on file   • Number of children: Not on file   • Years of education: Not on file   • Highest education level: Not on file   Occupational History   • Not on file   Social Needs   • Financial resource strain: Not on file   • Food insecurity:     Worry: Not on file     Inability: Not on file   • Transportation needs:     Medical: Not on file     Non-medical: Not on file   Tobacco Use   • Smoking status:  Former Smoker     Packs/day: 0.50     Years: 6.00     Pack years: 3.00     Types: Cigarettes     Last attempt to quit: 2015     Years since quittin.3   • Smokeless tobacco: Never Used   Substance and Sexual Activity   • Alcohol use: Not Currently     Frequency: 2-4 times a month   • Drug use: No     Comment: hx of meth and cannabis use, sober since 2018   • Sexual activity: Yes     Partners: Male   Lifestyle   • Physical activity:     Days per week: Not on file     Minutes per session: Not on file   • Stress: Not on file   Relationships   • Social connections:     Talks on phone: Not on file     Gets together: Not on file     Attends Faith service: Not on file     Active member of club or organization: Not on file     Attends meetings of clubs or organizations: Not on file     Relationship status: Not on file   • Intimate partner violence:     Fear of current or ex partner: Not on file     Emotionally abused: Not on file     Physically abused: Not on file     Forced sexual activity: Not on file   Other Topics Concern   • Not on file   Social History Narrative   • Not on file         Medications:    Current Outpatient Medications:   •  amoxicillin-clavulanate (AUGMENTIN) 875-125 MG Tab, Take 1 Tab by mouth 2 times a day for 7 days., Disp: 14 Tab, Rfl: 0  •  methylPREDNISolone (MEDROL DOSEPAK) 4 MG Tablet Therapy Pack, Follow schedule on package instructions., Disp: 21 Tab, Rfl: 0  •  fluticasone-salmeterol (ADVAIR) 250-50 MCG/DOSE AEROSOL POWDER, BREATH ACTIVATED, Inhale 1 Puff by mouth every 12 hours., Disp: 1 Inhaler, Rfl: 0  •  promethazine-dextromethorphan (PROMETHAZINE-DM) 6.25-15 MG/5ML syrup, Take 5 mL by mouth every four hours as needed., Disp: 120 mL, Rfl: 0  •  escitalopram (LEXAPRO) 20 MG tablet, Take 1 Tab by mouth every day., Disp: 30 Tab, Rfl: 2  •  Melatonin 1 MG Tab, Take  by mouth., Disp: , Rfl:   •  ibuprofen (MOTRIN) 200 MG Tab, Take 400 mg by mouth every 6 hours as needed for Mild  Pain., Disp: , Rfl:       Allergies:  Bactrim ds and Hydrocodone    ROS  Review of Systems   Constitutional: Positive for malaise/fatigue. Negative for fever and diaphoresis.   HENT: Positive for congestion and sore throat and POS for right ear pain. Negative for ear discharge, hearing loss, nosebleeds and tinnitus.    Eyes: Negative for blurred vision, double vision and photophobia.   Respiratory: Positive for cough, sputum production, shortness of breath and wheezing. Negative for hemoptysis.    Cardiovascular: Negative for chest pain and palpitations.   Gastrointestinal: Negative for nausea, vomiting, abdominal pain, diarrhea and constipation.   Genitourinary: Negative for dysuria and flank pain.   Musculoskeletal: Negative for joint pain and myalgias.   Skin: Negative for itching and rash.   Neurological:  Negative for dizziness, tingling and weakness.   Endo/Heme/Allergies: Does not bruise/bleed easily.   Psychiatric/Behavioral: Negative for depression. The patient is not nervous/anxious.             Objective:     /78   Pulse 96   Temp 36.7 °C (98 °F) (Temporal)   Resp 18   Wt 70.8 kg (156 lb)   SpO2 99%   BMI 31.51 kg/m²      Physical Exam      Physical Exam   Constitutional: PT is oriented to person, place, and time. PT appears well-developed and well-nourished. No distress.   HENT:   Head: Normocephalic and atraumatic.   Right Ear: Hearing, external ear and ear canal normal. Tympanic membrane is erythematous and bulging. A middle ear effusion is present.   Left Ear: Hearing, tympanic membrane, external ear and ear canal normal.   Nose: Mucosal edema, rhinorrhea and sinus tenderness present. Right sinus exhibits frontal sinus tenderness. Left sinus exhibits frontal sinus tenderness.   Mouth/Throat: Uvula is midline. Mucous membranes are pale. Posterior oropharyngeal edema and posterior oropharyngeal erythema present. No oropharyngeal exudate.   Eyes: Conjunctivae normal and EOM are normal. Pupils  are equal, round, and reactive to light. Right eye exhibits no discharge. Left eye exhibits no discharge.   Neck: Normal range of motion. Neck supple. No thyromegaly present.   Cardiovascular: Normal rate, regular rhythm, normal heart sounds and intact distal pulses.  Exam reveals no gallop and no friction rub.    No murmur heard.  Pulmonary/Chest: Effort normal. No respiratory distress. PT has wheezes. PT has no rales. PT exhibits tenderness.   Abdominal: Soft. Bowel sounds are normal. PT exhibits no distension and no mass. There is no tenderness. There is no rebound and no guarding.   Musculoskeletal: Normal range of motion. PT exhibits no edema and no tenderness.   Lymphadenopathy:     PT has no cervical adenopathy.   Neurological: Pt is alert and oriented to person, place, and time. Pt has normal reflexes. No cranial nerve deficit.   Skin: Skin is warm and dry. No rash noted. No erythema.   Psychiatric: PT has a normal mood and affect. Pt behavior is normal. Judgment and thought content normal.          Assessment/Plan:       1. Bronchitis    - amoxicillin-clavulanate (AUGMENTIN) 875-125 MG Tab; Take 1 Tab by mouth 2 times a day for 7 days.  Dispense: 14 Tab; Refill: 0  - methylPREDNISolone (MEDROL DOSEPAK) 4 MG Tablet Therapy Pack; Follow schedule on package instructions.  Dispense: 21 Tab; Refill: 0    2. Acute non-recurrent pansinusitis    - amoxicillin-clavulanate (AUGMENTIN) 875-125 MG Tab; Take 1 Tab by mouth 2 times a day for 7 days.  Dispense: 14 Tab; Refill: 0  - methylPREDNISolone (MEDROL DOSEPAK) 4 MG Tablet Therapy Pack; Follow schedule on package instructions.  Dispense: 21 Tab; Refill: 0    3. Acute suppurative otitis media of right ear without spontaneous rupture of tympanic membrane, recurrence not specified    - amoxicillin-clavulanate (AUGMENTIN) 875-125 MG Tab; Take 1 Tab by mouth 2 times a day for 7 days.  Dispense: 14 Tab; Refill: 0  - methylPREDNISolone (MEDROL DOSEPAK) 4 MG Tablet Therapy  Pack; Follow schedule on package instructions.  Dispense: 21 Tab; Refill: 0    4. URI with cough and congestion    - methylPREDNISolone (MEDROL DOSEPAK) 4 MG Tablet Therapy Pack; Follow schedule on package instructions.  Dispense: 21 Tab; Refill: 0  - fluticasone-salmeterol (ADVAIR) 250-50 MCG/DOSE AEROSOL POWDER, BREATH ACTIVATED; Inhale 1 Puff by mouth every 12 hours.  Dispense: 1 Inhaler; Refill: 0  - promethazine-dextromethorphan (PROMETHAZINE-DM) 6.25-15 MG/5ML syrup; Take 5 mL by mouth every four hours as needed.  Dispense: 120 mL; Refill: 0    5. Moderate persistent asthma with exacerbation    - fluticasone-salmeterol (ADVAIR) 250-50 MCG/DOSE AEROSOL POWDER, BREATH ACTIVATED; Inhale 1 Puff by mouth every 12 hours.  Dispense: 1 Inhaler; Refill: 0      Concern for worsening symptoms of URI which shortly could transition to pneumonia and worsening sinus congestion and infection with powerful cough keeping pt up at night as they must sleep upright to avoid coughing fits.  Diff DX: Bronchitis, Sinusitis, Pneumonia, Influenza, Viral URI, Allergies  Rest, fluids encouraged.  OTC decongestant for congestion/cough  Note given for work.  AVS with medical info given.  Pt was in full understanding and agreement with the plan.  Differential diagnosis, natural history, supportive care, and indications for immediate follow-up discussed. All questions answered. Patient agrees with the plan of care.  Follow-up as needed if symptoms worsen or fail to improve to PCP, Urgent care or Emergency Room.

## 2020-05-06 NOTE — PROGRESS NOTES
INFECTIOUS DISEASE PROGRESS NOTE    ASSESSMENT:   1.  Acute encephalopathy ? Related to acute CMV.  LP negative for infection  2.  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  Transaminitis, possibly 2/2 CMV.  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  5.  HIV, previously well controlled with Triumeq.  HLA B5 701 following transplant was negative   6.  1 out of 2 blood cultures with staph hemolyticus, suspect skin contaminant.  f/u blood cultures are negative  7.  Low grade fever, resolved.  ? Related to acute CMV vs LLL Pneumonia  8.  Hematuria, dysuria, ? Related to BK hemorrhagic cystitis  9.  Pancytopenia slowly improving, suspect related to CMV    PLAN:   Continue ganciclovir day #9  CMV viral load 3.8K -> 14.4K -> 9.8K ->13.8K - 12.6K.  Repeat CMV viral load pending  F/u BK virus in blood  Steroids decreased yesterday  Consider local intravesical cidofovir through [facility]was if he does not improve with decreasing immunosuppression  Continue voriconazole.  Level ok at 2.1  Continue triumeq    Jeannie Messer MD   [phone]    -------------------------------------------------------------------------------------------------------------------    SUBJECTIVE:  Afebrile, reports some dysuria, +hematuria from [place] catheter.  No other complaints.  OBJECTIVE:  Tmax Temp (24hrs), Av.7 °F (36.5 °C), Min:97.2 °F (36.2 °C), Max:98.2 °F (36.8 °C)     Last Vitals   Vitals:    20 0655   BP: (!) 145/87   Pulse: 80   Resp:    Temp:        Gen: NAD, awake, alert, conversant, but slower to speak than yesterday  HEENT: anicteric, PERRL  CV: RRR nls1 s2, + trifusion catheter  Pulm: BCTA, no increased respiratory effort  Abd: SNTND  Ext: no rash, +edema  Psych: flat affect, alert and oriented x 3,  Blood tinged urine in [place] catheter  Skin: no rash    ANTIMICROBIALS  voriconazole  triumeq  Ganciclovir    LAB:  Recent Labs       Isa Mcclure is a 22 y.o. here for a Depo Provera Injection.     Date of last Depo Provera Injection: 06/14/2017  Current date within therapeutic range?: Yes   Urine pregnancy test done (needed if out of date range): not performed  Date of office visit:09/15/2017  Date of last pap (if > 21 years old)/ GYN exam: Unknown  Dx: Contraceptive use    Order and dose verified by: LM  Patient tolerated injection and no adverse effects were observed or reported: Yes    # of Administrations remaining in MAR: 0  Next injection due between 12/01/2017 and 12/15/2017     05/04/20  0408 05/04/20  0956 05/05/20  0330 05/05/20  1310 05/06/20  0415   WBC 2.2*  --  1.4* 2.1* 1.6*   HGB 8.1*  --  6.6* 8.0* 7.2*   HCT 24.0*  --  20.3* 24.1* 22.0*   PLT 26* 46* 29* 41* 37*   .3*  --  108.6* 103.9* 100.0       Recent Labs   Lab 05/06/20  0415 05/05/20  0330 05/04/20  0408   SODIUM 137 138 137   POTASSIUM 4.1 3.9 4.0   CHLORIDE 110* 111* 109*   CO2 20* 21 20*   BUN 26* 29* 31*   CREATININE 0.65* 0.74 0.76   GLUCOSE 97 98 96   CALCIUM 8.2* 8.3* 8.9       Microbiology Results  (Last 10 results in the past 7 days)    Specimen   Gram Smear   Culture Result   Status      04/18/20  1559  04/17/20  0915  04/18/20  1559  04/18/20  1559     BLOOD, ANTECUBITAL R AC GRAM POSITIVE COCCI IN CLUSTERS NO GROWTH <24 HRS. PENDING      (CRITICAL/ALERT VALUE)          CALLED TO, READ BACK AND CONFIRMED DR. LAVERNE CHANDLER 4/18/20 0530 [id number]        04/18/20  1554   04/18/20  1554  04/18/20  1554     BLOOD, ANTECUBITAL LEFT AC   NO GROWTH <24 HRS. PENDING    04/17/20  1045   04/17/20  1045  04/17/20  1045     BLOOD BLOOD   NO GROWTH 2 DAYS. PENDING    04/17/20  0915   04/17/20  0915  04/17/20  0915     BLOOD BLOOD   STAPHYLOCOCCUS HAEMOLYTICUS When only one blood culture is positive, susceptibilities will not be performed. 04/19/2020 FINAL        STAPHYLOCOCCUS EPIDERMIDIS When only one blood culture is positive, susceptibilities will not be performed.          NO S. AUREUS DETECTED, NO MRSA DETECTED. TEST PERFORMED BY NUCLEIC ACID AMPLIFICATION. A negative result does not rule out bacterial concentrations below the level of detection of the assay.              RADIOLOGY:  CXR  - reviewed  Note:  Assessment and Plan have been moved to the top of the screen for ease of the viewer such that the plan can be seen without scrolling to the bottom of the note.

## 2020-05-26 ENCOUNTER — TELEPHONE (OUTPATIENT)
Dept: SCHEDULING | Facility: IMAGING CENTER | Age: 25
End: 2020-05-26

## 2020-06-19 ENCOUNTER — OFFICE VISIT (OUTPATIENT)
Dept: MEDICAL GROUP | Facility: MEDICAL CENTER | Age: 25
End: 2020-06-19
Attending: FAMILY MEDICINE
Payer: MEDICAID

## 2020-06-19 VITALS
HEIGHT: 59 IN | WEIGHT: 165 LBS | OXYGEN SATURATION: 97 % | SYSTOLIC BLOOD PRESSURE: 112 MMHG | TEMPERATURE: 97.5 F | BODY MASS INDEX: 33.26 KG/M2 | DIASTOLIC BLOOD PRESSURE: 63 MMHG | RESPIRATION RATE: 14 BRPM | HEART RATE: 81 BPM

## 2020-06-19 DIAGNOSIS — F17.200 SMOKER: ICD-10-CM

## 2020-06-19 DIAGNOSIS — R53.83 OTHER FATIGUE: ICD-10-CM

## 2020-06-19 DIAGNOSIS — J45.909 MILD ASTHMA WITHOUT COMPLICATION, UNSPECIFIED WHETHER PERSISTENT: ICD-10-CM

## 2020-06-19 DIAGNOSIS — N92.6 ABNORMAL MENSTRUATION: ICD-10-CM

## 2020-06-19 DIAGNOSIS — F32.A DEPRESSION, UNSPECIFIED DEPRESSION TYPE: ICD-10-CM

## 2020-06-19 PROCEDURE — 99213 OFFICE O/P EST LOW 20 MIN: CPT | Performed by: FAMILY MEDICINE

## 2020-06-19 PROCEDURE — 99214 OFFICE O/P EST MOD 30 MIN: CPT | Performed by: FAMILY MEDICINE

## 2020-06-19 RX ORDER — BUPROPION HYDROCHLORIDE 150 MG/1
150 TABLET ORAL EVERY MORNING
Qty: 30 TAB | Refills: 6 | Status: SHIPPED | OUTPATIENT
Start: 2020-06-19 | End: 2021-01-12

## 2020-06-19 RX ORDER — ALBUTEROL SULFATE 90 UG/1
2 AEROSOL, METERED RESPIRATORY (INHALATION) EVERY 6 HOURS PRN
Qty: 8.5 G | Refills: 3 | Status: SHIPPED | OUTPATIENT
Start: 2020-06-19

## 2020-06-19 SDOH — ECONOMIC STABILITY: FOOD INSECURITY: WITHIN THE PAST 12 MONTHS, THE FOOD YOU BOUGHT JUST DIDN'T LAST AND YOU DIDN'T HAVE MONEY TO GET MORE.: NEVER TRUE

## 2020-06-19 SDOH — HEALTH STABILITY: MENTAL HEALTH
STRESS IS WHEN SOMEONE FEELS TENSE, NERVOUS, ANXIOUS, OR CAN'T SLEEP AT NIGHT BECAUSE THEIR MIND IS TROUBLED. HOW STRESSED ARE YOU?: VERY MUCH

## 2020-06-19 SDOH — HEALTH STABILITY: PHYSICAL HEALTH: ON AVERAGE, HOW MANY MINUTES DO YOU ENGAGE IN EXERCISE AT THIS LEVEL?: 50 MINUTES

## 2020-06-19 SDOH — HEALTH STABILITY: MENTAL HEALTH: HOW MANY STANDARD DRINKS CONTAINING ALCOHOL DO YOU HAVE ON A TYPICAL DAY?: 1 OR 2

## 2020-06-19 SDOH — ECONOMIC STABILITY: TRANSPORTATION INSECURITY
IN THE PAST 12 MONTHS, HAS THE LACK OF TRANSPORTATION KEPT YOU FROM MEDICAL APPOINTMENTS OR FROM GETTING MEDICATIONS?: NO

## 2020-06-19 SDOH — SOCIAL STABILITY: SOCIAL NETWORK: ARE YOU MARRIED, WIDOWED, DIVORCED, SEPARATED, NEVER MARRIED, OR LIVING WITH A PARTNER?: DIVORCED

## 2020-06-19 SDOH — ECONOMIC STABILITY: HOUSING INSECURITY
IN THE LAST 12 MONTHS, WAS THERE A TIME WHEN YOU DID NOT HAVE A STEADY PLACE TO SLEEP OR SLEPT IN A SHELTER (INCLUDING NOW)?: NO

## 2020-06-19 SDOH — ECONOMIC STABILITY: INCOME INSECURITY: IN THE LAST 12 MONTHS, WAS THERE A TIME WHEN YOU WERE NOT ABLE TO PAY THE MORTGAGE OR RENT ON TIME?: NO

## 2020-06-19 SDOH — HEALTH STABILITY: PHYSICAL HEALTH: ON AVERAGE, HOW MANY DAYS PER WEEK DO YOU ENGAGE IN MODERATE TO STRENUOUS EXERCISE (LIKE A BRISK WALK)?: 1 DAY

## 2020-06-19 SDOH — HEALTH STABILITY: MENTAL HEALTH: HOW OFTEN DO YOU HAVE 6 OR MORE DRINKS ON ONE OCCASION?: NEVER

## 2020-06-19 SDOH — HEALTH STABILITY: MENTAL HEALTH: HOW OFTEN DO YOU HAVE A DRINK CONTAINING ALCOHOL?: MONTHLY OR LESS

## 2020-06-19 SDOH — ECONOMIC STABILITY: INCOME INSECURITY: HOW HARD IS IT FOR YOU TO PAY FOR THE VERY BASICS LIKE FOOD, HOUSING, MEDICAL CARE, AND HEATING?: NOT VERY HARD

## 2020-06-19 SDOH — SOCIAL STABILITY: SOCIAL NETWORK: HOW OFTEN DO YOU ATTENT MEETINGS OF THE CLUB OR ORGANIZATION YOU BELONG TO?: NEVER

## 2020-06-19 SDOH — ECONOMIC STABILITY: TRANSPORTATION INSECURITY
IN THE PAST 12 MONTHS, HAS LACK OF RELIABLE TRANSPORTATION KEPT YOU FROM MEDICAL APPOINTMENTS, MEETINGS, WORK OR FROM GETTING THINGS NEEDED FOR DAILY LIVING?: NO

## 2020-06-19 SDOH — SOCIAL STABILITY: SOCIAL NETWORK: HOW OFTEN DO YOU GET TOGETHER WITH FRIENDS OR RELATIVES?: NEVER

## 2020-06-19 SDOH — SOCIAL STABILITY: SOCIAL NETWORK
DO YOU BELONG TO ANY CLUBS OR ORGANIZATIONS SUCH AS CHURCH GROUPS UNIONS, FRATERNAL OR ATHLETIC GROUPS, OR SCHOOL GROUPS?: NO

## 2020-06-19 SDOH — ECONOMIC STABILITY: HOUSING INSECURITY: IN THE LAST 12 MONTHS, HOW MANY PLACES HAVE YOU LIVED?: 1

## 2020-06-19 SDOH — HEALTH STABILITY: PHYSICAL HEALTH: ON AVERAGE, HOW MANY MINUTES DO YOU ENGAGE IN EXERCISE AT THIS LEVEL?: 50 MIN

## 2020-06-19 SDOH — ECONOMIC STABILITY: FOOD INSECURITY: WITHIN THE PAST 12 MONTHS, YOU WORRIED THAT YOUR FOOD WOULD RUN OUT BEFORE YOU GOT MONEY TO BUY MORE.: NEVER TRUE

## 2020-06-19 SDOH — SOCIAL STABILITY: SOCIAL NETWORK: IN A TYPICAL WEEK, HOW MANY TIMES DO YOU TALK ON THE PHONE WITH FAMILY, FRIENDS, OR NEIGHBORS?: NEVER

## 2020-06-19 SDOH — ECONOMIC STABILITY: TRANSPORTATION INSECURITY
IN THE PAST 12 MONTHS, HAS LACK OF TRANSPORTATION KEPT YOU FROM MEETINGS, WORK, OR FROM GETTING THINGS NEEDED FOR DAILY LIVING?: NO

## 2020-06-19 SDOH — SOCIAL STABILITY: SOCIAL NETWORK: HOW OFTEN DO YOU ATTEND CHURCH OR RELIGIOUS SERVICES?: NEVER

## 2020-06-19 ASSESSMENT — SOCIAL DETERMINANTS OF HEALTH (SDOH)
IN A TYPICAL WEEK, HOW MANY TIMES DO YOU TALK ON THE PHONE WITH FAMILY, FRIENDS, OR NEIGHBORS?: NEVER
HOW MANY DRINKS CONTAINING ALCOHOL DO YOU HAVE ON A TYPICAL DAY WHEN YOU ARE DRINKING: 1 OR 2
HOW OFTEN DO YOU ATTENT MEETINGS OF THE CLUB OR ORGANIZATION YOU BELONG TO?: NEVER
WITHIN THE PAST 12 MONTHS, YOU WORRIED THAT YOUR FOOD WOULD RUN OUT BEFORE YOU GOT THE MONEY TO BUY MORE: NEVER TRUE
HOW OFTEN DO YOU ATTEND CHURCH OR RELIGIOUS SERVICES?: NEVER
DO YOU BELONG TO ANY CLUBS OR ORGANIZATIONS SUCH AS CHURCH GROUPS UNIONS, FRATERNAL OR ATHLETIC GROUPS, OR SCHOOL GROUPS?: NO
HOW OFTEN DO YOU HAVE A DRINK CONTAINING ALCOHOL: MONTHLY OR LESS
WITHIN THE PAST 12 MONTHS, THE FOOD YOU BOUGHT JUST DIDN'T LAST AND YOU DIDN'T HAVE MONEY TO GET MORE: NEVER TRUE
HOW OFTEN DO YOU GET TOGETHER WITH FRIENDS OR RELATIVES?: NEVER
HOW HARD IS IT FOR YOU TO PAY FOR THE VERY BASICS LIKE FOOD, HOUSING, MEDICAL CARE, AND HEATING?: NOT VERY HARD
HOW OFTEN DO YOU HAVE SIX OR MORE DRINKS ON ONE OCCASION: NEVER

## 2020-06-19 ASSESSMENT — FIBROSIS 4 INDEX: FIB4 SCORE: 0.41

## 2020-06-19 NOTE — PROGRESS NOTES
CC: Here to establish care    HPI: Established patient  Isa presents today to establish care, reviewed medical problems, medical records past surgical history, family/social history and medications today, discussed the following:       Depression, unspecified depression type  Reports long-term history of depression and anxiety, struggling with depression for a long time tried several medications and patient said she decided to be off of them because of side effects.  Recently has been experiencing more depression and less anxiety symptoms.  Patient said in the past she tried Wellbutrin and it helped a lot and would like to go back to work.  Denies intentions to harm self or others     Smoker  Smoker, willing to quit at this time.  Counseled strongly for smoking cessation     Mild asthma without complication, unspecified whether persistent  History of mild intermittent asthma well-controlled on as needed use of albuterol, requesting refill of the medication denies shortness of breath or cough at this time    Other fatigue  Reports in general tiredness fatigue and low energy without unintentional weight loss or fever or night sweats denies chronic cough    Abnormal menstruation    Reports for the past 1 year she has been experiencing abdominal distention the nature of her menstrual flow, associated with severe lower abdominal pain and cramping with each menstruation and during ovulation.  Also reports increased acne in her face and abnormal hair growth in the chin area and the mustache area.  Not sexually active at this time.  Patient had to pregnancy before that were natural without induction.  Last menstruation started today.    Patient Active Problem List    Diagnosis Date Noted   • Methamphetamine use disorder, mild, in sustained remission (HCC) 09/13/2019   • Amenorrhea 08/27/2019   • Migraine without aura and without status migrainosus, not intractable 05/29/2019   • Eczema 04/17/2019   • Obesity (BMI 30-39.9)  03/27/2018   • Moderate episode of recurrent major depressive disorder (HCC) 01/13/2017   • PTSD (post-traumatic stress disorder) 01/13/2017       Current Outpatient Medications   Medication Sig Dispense Refill   • albuterol 108 (90 Base) MCG/ACT Aero Soln inhalation aerosol Inhale 2 Puffs by mouth every 6 hours as needed for Shortness of Breath. 8.5 g 3   • buPROPion (WELLBUTRIN XL) 150 MG XL tablet Take 1 Tab by mouth every morning. 30 Tab 6     No current facility-administered medications for this visit.          Allergies as of 06/19/2020 - Reviewed 06/19/2020   Allergen Reaction Noted   • Bactrim ds Hives 05/15/2019   • Hydrocodone Itching and Nausea 11/07/2017        ROS: Denies any chest pain, Shortness of breath, Changes bowel or bladder, Lower extremity edema.    Physical Exam:  Gen.: Well-developed, well-nourished, no apparent distress,pleasant and cooperative with the examination  Skin:  Warm and dry with good turgor. No rashes or suspicious lesions in visible areas  Eye: PERRLA, conjunctiva and sclera clear, lids normal  HEENT: Normocephalic/atraumatic, sinuses nontender with palpation, TMs clear, nares patent with pink mucosa and clear rhinorrhea, lips without lesions, oropharynx clear.  Neck: Trachea midline,no masses or adenopathy  Thyroid: normal consistency and size. No masses or nodules. Not tender with palpation.  Cor: Regular rate and rhythm without murmur, gallop or rub.  Lungs: Respirations unlabored.Clear to auscultation with equal breath sounds bilaterally. No wheezes, rhonchi.  Abdomen: Soft nontender without hepatosplenomegaly or masses appreciated, normoactive bowel sounds. No hernias.  Extremities: No cyanosis, clubbing or edema, Symmetrical without deformities or malformations. Pulses 2+ and symmetrical both upper and lower extremities  Lymphatic: No abnormal adenopathy of the neck groin or axillae.  Psych: Alert and oriented x 3.Normal affect, judgement,insight and  memory.        Assessment and Plan.   24 y.o. female establish care    1. Depression, unspecified depression type  Chronic problem, uncontrolled.  Restarted on Wellbutrin which helped her before.  Patient also to check thyroid function test  - buPROPion (WELLBUTRIN XL) 150 MG XL tablet; Take 1 Tab by mouth every morning.  Dispense: 30 Tab; Refill: 6    2. Smoker  Consistently for smoking cessation, ready to quit Wellbutrin will help with that started on Wellbutrin    3. Mild asthma without complication, unspecified whether persistent  Chronic problem, controlled refilled albuterol  - albuterol 108 (90 Base) MCG/ACT Aero Soln inhalation aerosol; Inhale 2 Puffs by mouth every 6 hours as needed for Shortness of Breath.  Dispense: 8.5 g; Refill: 3    4. Other fatigue  New problem, insert fatigue  - CBC WITH DIFFERENTIAL; Future  - Comp Metabolic Panel; Future  - TSH; Future  - FREE THYROXINE; Future  - VITAMIN D,25 HYDROXY; Future  - VITAMIN B12; Future  - HEMOGLOBIN A1C; Future    5. Abnormal menstruation  Abnormal menstruation with dysmenorrhea and hirsutism and acne, will rule out polycystic ovarian disease.  Differential diagnoses include endometriosis  - US-PELVIC COMPLETE (TRANSABDOMINAL/TRANSVAGINAL) (COMBO); Future  - FSH/LH; Future    Please note that this dictation was created using voice recognition software. I have made every reasonable attempt to correct obvious errors but there may be errors of grammar and content that I may have overlooked prior to finalization of this note.

## 2020-06-29 ENCOUNTER — HOSPITAL ENCOUNTER (OUTPATIENT)
Dept: LAB | Facility: MEDICAL CENTER | Age: 25
End: 2020-06-29
Attending: FAMILY MEDICINE
Payer: MEDICAID

## 2020-06-29 DIAGNOSIS — R53.83 OTHER FATIGUE: ICD-10-CM

## 2020-06-29 DIAGNOSIS — N92.6 ABNORMAL MENSTRUATION: ICD-10-CM

## 2020-06-29 LAB
25(OH)D3 SERPL-MCNC: 15 NG/ML (ref 30–100)
ALBUMIN SERPL BCP-MCNC: 4.5 G/DL (ref 3.2–4.9)
ALBUMIN/GLOB SERPL: 1.4 G/DL
ALP SERPL-CCNC: 65 U/L (ref 30–99)
ALT SERPL-CCNC: 15 U/L (ref 2–50)
ANION GAP SERPL CALC-SCNC: 12 MMOL/L (ref 7–16)
AST SERPL-CCNC: 17 U/L (ref 12–45)
BASOPHILS # BLD AUTO: 0.3 % (ref 0–1.8)
BASOPHILS # BLD: 0.02 K/UL (ref 0–0.12)
BILIRUB SERPL-MCNC: 0.4 MG/DL (ref 0.1–1.5)
BUN SERPL-MCNC: 13 MG/DL (ref 8–22)
CALCIUM SERPL-MCNC: 9.6 MG/DL (ref 8.5–10.5)
CHLORIDE SERPL-SCNC: 103 MMOL/L (ref 96–112)
CO2 SERPL-SCNC: 24 MMOL/L (ref 20–33)
CREAT SERPL-MCNC: 0.75 MG/DL (ref 0.5–1.4)
EOSINOPHIL # BLD AUTO: 0.05 K/UL (ref 0–0.51)
EOSINOPHIL NFR BLD: 0.7 % (ref 0–6.9)
ERYTHROCYTE [DISTWIDTH] IN BLOOD BY AUTOMATED COUNT: 41.6 FL (ref 35.9–50)
EST. AVERAGE GLUCOSE BLD GHB EST-MCNC: 105 MG/DL
FSH SERPL-ACNC: 7.7 MIU/ML
GLOBULIN SER CALC-MCNC: 3.2 G/DL (ref 1.9–3.5)
GLUCOSE SERPL-MCNC: 98 MG/DL (ref 65–99)
HBA1C MFR BLD: 5.3 % (ref 0–5.6)
HCT VFR BLD AUTO: 39.3 % (ref 37–47)
HGB BLD-MCNC: 12.8 G/DL (ref 12–16)
IMM GRANULOCYTES # BLD AUTO: 0.02 K/UL (ref 0–0.11)
IMM GRANULOCYTES NFR BLD AUTO: 0.3 % (ref 0–0.9)
LH SERPL-ACNC: 3.8 IU/L
LYMPHOCYTES # BLD AUTO: 2.33 K/UL (ref 1–4.8)
LYMPHOCYTES NFR BLD: 33.9 % (ref 22–41)
MCH RBC QN AUTO: 29.4 PG (ref 27–33)
MCHC RBC AUTO-ENTMCNC: 32.6 G/DL (ref 33.6–35)
MCV RBC AUTO: 90.3 FL (ref 81.4–97.8)
MONOCYTES # BLD AUTO: 0.41 K/UL (ref 0–0.85)
MONOCYTES NFR BLD AUTO: 6 % (ref 0–13.4)
NEUTROPHILS # BLD AUTO: 4.05 K/UL (ref 2–7.15)
NEUTROPHILS NFR BLD: 58.8 % (ref 44–72)
NRBC # BLD AUTO: 0 K/UL
NRBC BLD-RTO: 0 /100 WBC
PLATELET # BLD AUTO: 315 K/UL (ref 164–446)
PMV BLD AUTO: 11 FL (ref 9–12.9)
POTASSIUM SERPL-SCNC: 4.3 MMOL/L (ref 3.6–5.5)
PROT SERPL-MCNC: 7.7 G/DL (ref 6–8.2)
RBC # BLD AUTO: 4.35 M/UL (ref 4.2–5.4)
SODIUM SERPL-SCNC: 139 MMOL/L (ref 135–145)
T4 FREE SERPL-MCNC: 1.08 NG/DL (ref 0.93–1.7)
TSH SERPL DL<=0.005 MIU/L-ACNC: 2.01 UIU/ML (ref 0.38–5.33)
VIT B12 SERPL-MCNC: 593 PG/ML (ref 211–911)
WBC # BLD AUTO: 6.9 K/UL (ref 4.8–10.8)

## 2020-06-29 PROCEDURE — 36415 COLL VENOUS BLD VENIPUNCTURE: CPT

## 2020-06-29 PROCEDURE — 85025 COMPLETE CBC W/AUTO DIFF WBC: CPT

## 2020-06-29 PROCEDURE — 82607 VITAMIN B-12: CPT

## 2020-06-29 PROCEDURE — 83002 ASSAY OF GONADOTROPIN (LH): CPT

## 2020-06-29 PROCEDURE — 83001 ASSAY OF GONADOTROPIN (FSH): CPT

## 2020-06-29 PROCEDURE — 83036 HEMOGLOBIN GLYCOSYLATED A1C: CPT

## 2020-06-29 PROCEDURE — 84443 ASSAY THYROID STIM HORMONE: CPT

## 2020-06-29 PROCEDURE — 80053 COMPREHEN METABOLIC PANEL: CPT

## 2020-06-29 PROCEDURE — 84439 ASSAY OF FREE THYROXINE: CPT

## 2020-06-29 PROCEDURE — 82306 VITAMIN D 25 HYDROXY: CPT

## 2020-06-30 DIAGNOSIS — R79.89 LOW VITAMIN D LEVEL: ICD-10-CM

## 2020-06-30 RX ORDER — ERGOCALCIFEROL 1.25 MG/1
50000 CAPSULE ORAL
Qty: 5 CAP | Refills: 3 | Status: SHIPPED | OUTPATIENT
Start: 2020-06-30 | End: 2021-01-12

## 2020-07-09 ENCOUNTER — HOSPITAL ENCOUNTER (OUTPATIENT)
Dept: RADIOLOGY | Facility: MEDICAL CENTER | Age: 25
End: 2020-07-09
Attending: FAMILY MEDICINE
Payer: MEDICAID

## 2020-07-09 DIAGNOSIS — N92.6 ABNORMAL MENSTRUATION: ICD-10-CM

## 2020-07-09 PROCEDURE — 76830 TRANSVAGINAL US NON-OB: CPT

## 2020-07-10 ENCOUNTER — OFFICE VISIT (OUTPATIENT)
Dept: MEDICAL GROUP | Facility: MEDICAL CENTER | Age: 25
End: 2020-07-10
Attending: FAMILY MEDICINE
Payer: MEDICAID

## 2020-07-10 VITALS
DIASTOLIC BLOOD PRESSURE: 68 MMHG | TEMPERATURE: 98.3 F | BODY MASS INDEX: 33.69 KG/M2 | WEIGHT: 167.1 LBS | HEIGHT: 59 IN | OXYGEN SATURATION: 99 % | HEART RATE: 84 BPM | RESPIRATION RATE: 16 BRPM | SYSTOLIC BLOOD PRESSURE: 112 MMHG

## 2020-07-10 DIAGNOSIS — R79.89 LOW VITAMIN D LEVEL: ICD-10-CM

## 2020-07-10 DIAGNOSIS — D21.9 FIBROID: ICD-10-CM

## 2020-07-10 DIAGNOSIS — F17.200 SMOKER: ICD-10-CM

## 2020-07-10 DIAGNOSIS — F32.A DEPRESSION, UNSPECIFIED DEPRESSION TYPE: ICD-10-CM

## 2020-07-10 PROCEDURE — 99213 OFFICE O/P EST LOW 20 MIN: CPT | Performed by: FAMILY MEDICINE

## 2020-07-10 PROCEDURE — 99214 OFFICE O/P EST MOD 30 MIN: CPT | Performed by: FAMILY MEDICINE

## 2020-07-10 ASSESSMENT — FIBROSIS 4 INDEX: FIB4 SCORE: 0.36

## 2020-07-10 NOTE — PROGRESS NOTES
Chief Complaint:   Chief Complaint   Patient presents with   • Lab Results       HPI: Established patient  Isa Mcclure is a 25 y.o. female who presents for follow-up, discussed the following concerns as follow today:       Fibroid  Reviewed with the patient pelvic ultrasound which shows evidence of fibroid at the fundus of the uterus and the posterior side, patient continues to have severe pelvic pain and discomfort especially before her.,  Discussed with the patient lab work results normal LH and FSH level.  Reports no heavy menstruation at this time    Low vitamin D level  Low levels of vitamin D, not taking any kind of supplementation at this time    Smoker  Patient said after starting her on Wellbutrin, she already stopped smoking for the past 2 weeks, denies side effects     Depression, unspecified depression type    Reports that her depression stays the same although she has been taking Wellbutrin for 2 weeks but she does not feel that the depression has been affected by the medication.  Denies red flags like intentions to hurt self or others      Past medical history, family history, social history and medications reviewed and updated in the record. today  Current medications, problem list and allergies reviewed in Epic today  Health maintenance topics are reviewed and updated.    Patient Active Problem List    Diagnosis Date Noted   • Methamphetamine use disorder, mild, in sustained remission (HCC) 09/13/2019   • Amenorrhea 08/27/2019   • Migraine without aura and without status migrainosus, not intractable 05/29/2019   • Eczema 04/17/2019   • Obesity (BMI 30-39.9) 03/27/2018   • Moderate episode of recurrent major depressive disorder (HCC) 01/13/2017   • PTSD (post-traumatic stress disorder) 01/13/2017     Family History   Problem Relation Age of Onset   • Hypertension Mother    • Diabetes Mother    • Psychiatric Illness Mother    • Hyperlipidemia Mother    • Heart Disease Mother 60        MI   •  Hypertension Father    • Hyperlipidemia Father    • Psychiatric Illness Brother         depression     Social History     Socioeconomic History   • Marital status:      Spouse name: Not on file   • Number of children: Not on file   • Years of education: Not on file   • Highest education level: 12th grade   Occupational History   • Not on file   Social Needs   • Financial resource strain: Not very hard   • Food insecurity     Worry: Never true     Inability: Never true   • Transportation needs     Medical: No     Non-medical: No   Tobacco Use   • Smoking status: Current Some Day Smoker     Types: Cigarettes   • Smokeless tobacco: Never Used   • Tobacco comment: 2 times a week   Substance and Sexual Activity   • Alcohol use: Not Currently     Frequency: Monthly or less     Drinks per session: 1 or 2     Binge frequency: Never   • Drug use: No     Comment: hx of meth and cannabis use, sober since 2015   • Sexual activity: Not Currently     Partners: Male   Lifestyle   • Physical activity     Days per week: 1 day     Minutes per session: 50 min   • Stress: Very much   Relationships   • Social connections     Talks on phone: Never     Gets together: Never     Attends [employer] service: Never     Active member of club or organization: No     Attends meetings of clubs or organizations: Never     Relationship status:    • Intimate partner violence     Fear of current or ex partner: Not on file     Emotionally abused: Not on file     Physically abused: Not on file     Forced sexual activity: Not on file   Other Topics Concern   • Not on file   Social History Narrative   • Not on file     Current Outpatient Medications   Medication Sig Dispense Refill   • ergocalciferol (DRISDOL) 47078 UNIT capsule Take 1 Cap by mouth every 7 days. 5 Cap 3   • albuterol 108 (90 Base) MCG/ACT Aero Soln inhalation aerosol Inhale 2 Puffs by mouth every 6 hours as needed for Shortness of Breath. 8.5 g 3   • buPROPion (WELLBUTRIN  "XL) 150 MG XL tablet Take 1 Tab by mouth every morning. 30 Tab 6     No current facility-administered medications for this visit.            Review Of Systems  As documented in HPI above  PHYSICAL EXAMINATION:    /68 (BP Location: Left arm, Patient Position: Sitting, BP Cuff Size: Adult)   Pulse 84   Temp 36.8 °C (98.3 °F) (Temporal)   Resp 16   Ht 1.499 m (4' 11.02\")   Wt 75.8 kg (167 lb 1.6 oz)   LMP 06/19/2020 (Exact Date)   SpO2 99%   BMI 33.73 kg/m²   Gen.: Well-developed, well-nourished, no apparent distress, pleasant and cooperative with the examination  HEENT: Normocephalic/atraumatic,     Neck: No JVD or bruits, no adenopathy  Cor: Regular rate and rhythm without murmur gallop or rub    Extremities: No cyanosis, clubbing or edema          ASSESSMENT/Plan:  1. Fibroid   advised to follow-up with gynecology for further evaluation of pelvic pain dysmenorrhea and fibroid uterus, and also to rule out endometriosis  REFERRAL TO GYNECOLOGY   2. Low vitamin D level   patient started on high-dose vitamin D every week for 3 months we will recheck after 3 months   3. Smoker   congratulated on smoking cessation, advised to continue Wellbutrin   4. Depression, unspecified depression type   patient on Wellbutrin at this time, still not well controlled.  Advised to continue medication if not resolved or responding well for the Wellbutrin will consider adding another medication and referral to psychiatry.     Please note that this dictation was created using voice recognition software. I have made every reasonable attempt to correct obvious errors but there may be errors of grammar and content that I may have overlooked prior to finalization of this note.       "

## 2020-07-30 ENCOUNTER — GYNECOLOGY VISIT (OUTPATIENT)
Dept: OBGYN | Facility: CLINIC | Age: 25
End: 2020-07-30
Payer: MEDICAID

## 2020-07-30 VITALS
BODY MASS INDEX: 33.47 KG/M2 | HEIGHT: 59 IN | DIASTOLIC BLOOD PRESSURE: 78 MMHG | SYSTOLIC BLOOD PRESSURE: 120 MMHG | WEIGHT: 166 LBS

## 2020-07-30 DIAGNOSIS — Z30.430 ENCOUNTER FOR INSERTION OF MIRENA IUD: ICD-10-CM

## 2020-07-30 DIAGNOSIS — N94.6 PAINFUL MENSTRUATION: ICD-10-CM

## 2020-07-30 DIAGNOSIS — N92.0 MENORRHAGIA WITH REGULAR CYCLE: ICD-10-CM

## 2020-07-30 LAB
INT CON NEG: NEGATIVE
INT CON POS: POSITIVE
POC URINE PREGNANCY TEST: NEGATIVE

## 2020-07-30 PROCEDURE — 58300 INSERT INTRAUTERINE DEVICE: CPT | Performed by: OBSTETRICS & GYNECOLOGY

## 2020-07-30 PROCEDURE — 81025 URINE PREGNANCY TEST: CPT | Performed by: OBSTETRICS & GYNECOLOGY

## 2020-07-30 PROCEDURE — 99203 OFFICE O/P NEW LOW 30 MIN: CPT | Mod: 25 | Performed by: OBSTETRICS & GYNECOLOGY

## 2020-07-30 ASSESSMENT — FIBROSIS 4 INDEX: FIB4 SCORE: 0.36

## 2020-07-30 NOTE — PROGRESS NOTES
GYN Visit  CC: Heavy painful menses    Isa Mcclure is a 25 y.o.  who presents today for discussion of menses.  Patient reports she has a monthly cycle but they are very heavy and painful.  She has about 5-6 days of bleeding.  For about 2 days of this she needs to change a pad and tampon about every hour.  Menses have progressively gotten heavier since delivery of her last child four years ago.  Menses are also very painful.  They have been painful for years but are worse now than ever.  Takes ibuprofen which only helps some.   She has been on Depo and OCPs in the past and didn't like the way these affected her mood.  She had more issues with her depression when she was on those and therefore would like to discuss other options.  Denies any other complaints or concerns today.    GYN History:  Patient's last menstrual period was 2020.. Menarche @12.  Menses as above.  Last pap last year,  no h/o abnormal pap.  No history of cone biopsy, LEEP or any other cervical, uterine or gynecologic surgery. No history of sexually transmitted diseases.  Not currently sexually active.  Utilizing nothing for contraception and has used Depo and OCPs in the past.     OB History:    OB History    Para Term  AB Living   3 2 2   1 2   SAB TAB Ectopic Molar Multiple Live Births   1         2      # Outcome Date GA Lbr Reji/2nd Weight Sex Delivery Anes PTL Lv   3 Term 16 39w0d  3.033 kg (6 lb 11 oz) M Vag-Spont EPI N REBECA      Birth Comments: Pt states no complications.    2 Term 01/09/15 37w0d  2.466 kg (5 lb 7 oz) F Vag-Spont None N REEBCA      Birth Comments: Pt states no complications.    1 SAB               Birth Comments: Pt states no D/C needed.      Review of Systems:   Pertinent positives documented in HPI and all other systems reviewed & are negative.    All PMH, PSH, allergies, social history and FH reviewed and updated today:  Past Medical History:  Past Medical History:   Diagnosis Date    • Allergy    • Anxiety     Used Klonopin   • Asthma    • Depression    • Drug abuse, IV ([facility])    • Migraine    • Substance abuse ([facility])     Methamphetamine and MJ - clean 4 1/2 years   • Urinary tract infection      Past Surgical History:  Past Surgical History:   Procedure Laterality Date   • CHOLECYSTECTOMY       Medications:   Current Outpatient Medications Ordered in Epic   Medication Sig Dispense Refill   • ergocalciferol (DRISDOL) 84176 UNIT capsule Take 1 Cap by mouth every 7 days. 5 Cap 3   • albuterol 108 (90 Base) MCG/ACT Aero Soln inhalation aerosol Inhale 2 Puffs by mouth every 6 hours as needed for Shortness of Breath. 8.5 g 3   • buPROPion (WELLBUTRIN XL) 150 MG XL tablet Take 1 Tab by mouth every morning. 30 Tab 6     No current Epic-ordered facility-administered medications on file.      Allergies: Bactrim ds and Hydrocodone    Social History:  Social History     Socioeconomic History   • Marital status:      Spouse name: Not on file   • Number of children: Not on file   • Years of education: Not on file   • Highest education level: 12th grade   Occupational History   • Not on file   Social Needs   • Financial resource strain: Not very hard   • Food insecurity     Worry: Never true     Inability: Never true   • Transportation needs     Medical: No     Non-medical: No   Tobacco Use   • Smoking status: Current Some Day Smoker     Types: Cigarettes   • Smokeless tobacco: Never Used   • Tobacco comment: 2 times a week   Substance and Sexual Activity   • Alcohol use: Not Currently     Frequency: Monthly or less     Drinks per session: 1 or 2     Binge frequency: Never   • Drug use: No     Comment: hx of meth and cannabis use, sober since 2015   • Sexual activity: Not Currently     Partners: Male     Comment: none   Lifestyle   • Physical activity     Days per week: 1 day     Minutes per session: 50 min   • Stress: Very much   Relationships   • Social connections     Talks on phone: Never     Gets  "together: Never     Attends Hoahaoism service: Never     Active member of club or organization: No     Attends meetings of clubs or organizations: Never     Relationship status:    • Intimate partner violence     Fear of current or ex partner: Not on file     Emotionally abused: Not on file     Physically abused: Not on file     Forced sexual activity: Not on file   Other Topics Concern   • Not on file   Social History Narrative   • Not on file       Family History:  Family History   Problem Relation Age of Onset   • Hypertension Mother    • Diabetes Mother    • Psychiatric Illness Mother    • Hyperlipidemia Mother    • Heart Disease Mother 60        MI   • Hypertension Father    • Hyperlipidemia Father    • Psychiatric Illness Brother         depression   • No Known Problems Sister    • No Known Problems Maternal Aunt    • No Known Problems Brother    • No Known Problems Brother    • No Known Problems Sister    • No Known Problems Sister    • Hypertension Paternal Grandmother            Objective:   Vitals:  /78   Ht 1.499 m (4' 11\")   Wt 75.3 kg (166 lb)   Body mass index is 33.53 kg/m². (Goal BM I>18 <25)    Physical Exam:   Nursing note and vitals reviewed.  GENERAL: No acute distress  HENT: Atraumatic, normocephalic  EYES: Extraocular movements intact, pupils equal and reactive to light  NECK: Supple, Full ROM  CHEST: No deformities, Equal chest expansion  RESP: Unlabored, no stridor or audible wheeze  ABD: Soft, Nontender, Non-Distended  Extremities: No Clubbing, Cyanosis, or Edema  Skin: Warm/dry, without rases  Neuro: A/O x 4, CN 2-12 Grossly intact, Motor/sensory grossly intact  Psych: Normal mood, behavior, and affect    Genitourinary:  Normal appearing external female genitalia without any rashes, lesions, labial fusion or tenderness.  Vagina is pink moist and well rugated. Physiologic discharge present within vaginal vault.  Cervix well visualized without masses or lesions.  On bimanual " exam there is no cervical motion tenderness, uterus is retroverted, not enlarged, fixed, or tender.  No adnexal masses or tenderness.     US results from   FINDINGS:  The uterus measures 7.8 x 5.1 x 6.4 cm.  The uterine myometrium is heterogeneous. There is a subserosal fibroid involving the posterior uterine fundus measuring 1 cm in size..  The endometrial echo complex measures 1.7 cm.  Low resistive waveforms are confirmed within the ovaries.  The right ovary measures 4.6 x 2.4 x 2.0 centimeters.  The left ovary measures 2.3 x 3.5 x 2.2 cm.  There is a simple right ovarian cyst which measures 2 cm in size.  There is no free fluid seen.  IMPRESSION:  1.  1 cm subserosal uterine fibroid emanating from the fundus posteriorly.  2  2 cm simple right ovarian cyst.  3.  Endometrial stripe thickness measured at 17 mm.     Assessment/Plan:   Isa Mcclure is a 25 y.o.  female who presents for:    No diagnosis found.  #Symptomatic fibroid uterus.  Abnormal uterine bleeding-heavy menses.  Patient has fibroids on ultrasound performed prior to this visit.  We discussed this etiology as cause for having bleeding.  Discussed management options including forms of contraception.  After discussion patient IUD.  This was placed for her today.  #Suspect endometriosis.  Discussed management options including forms of contraception and continued use of NSAIDs.  IUD is placed today and we will continue use of.  #Follow up.  RTC in 2 to 4 weeks for string check or sooner if concerns or questions arise.    Patient was seen for 30 minutes of which > 50% of appointment time was spent on face-to-face counseling and coordination of care regarding the above.

## 2020-07-30 NOTE — PROGRESS NOTES
Patient here for New GYN for Dysmenorrhea and pelvic pain.   LMP: 7/20/2020   Last pap: 10/30/2019 WNL  # [phone]  Pharmacy verified   Pt states concerned about irregular menstrual flow, painful and was told has a cyst.   Mirena insertion today consent form signed and pregnancy test in clinic today negative

## 2020-07-30 NOTE — PROCEDURES
IUD Insertion    Date/Time: 7/30/2020 9:12 AM  Performed by: Cheryl Parker D.O.  Authorized by: Cheryl Parker D.O.     Consent:     Consent obtained:  Written    Consent given by:  Patient    Procedure risks and benefits discussed: yes      Patient questions answered: yes      Patient agrees, verbalizes understanding, and wants to proceed: yes      Educational handouts given: yes      Instructions and paperwork completed: yes    Pre-procedure details:     Negative GC/chlamydia test: no      Negative urine pregnancy test: yes      Negative serum pregnancy test: no    Procedure:     Pelvic exam performed: yes      Sterile speculum placed in vagina: yes      Cervix visualized: yes      Cervix cleaned and prepped in sterile fashion: yes      Tenaculum applied to cervix: yes      Dilation needed: no      Uterus sounded: yes      Uterus sound depth (cm):  7    IUD inserted with no complications: yes      IUD type:  Mirena  Post-procedure:     Patient tolerated procedure well: yes      Patient will follow up after next period: yes    Comments:      Mirena IUD lot# [id number]

## 2020-08-14 ENCOUNTER — GYNECOLOGY VISIT (OUTPATIENT)
Dept: OBGYN | Facility: CLINIC | Age: 25
End: 2020-08-14
Payer: MEDICAID

## 2020-08-14 VITALS
DIASTOLIC BLOOD PRESSURE: 66 MMHG | SYSTOLIC BLOOD PRESSURE: 118 MMHG | HEIGHT: 59 IN | WEIGHT: 166 LBS | BODY MASS INDEX: 33.47 KG/M2

## 2020-08-14 DIAGNOSIS — N92.0 MENORRHAGIA WITH REGULAR CYCLE: ICD-10-CM

## 2020-08-14 DIAGNOSIS — N94.6 PAINFUL MENSTRUATION: ICD-10-CM

## 2020-08-14 DIAGNOSIS — Z97.5 IUD (INTRAUTERINE DEVICE) IN PLACE: ICD-10-CM

## 2020-08-14 DIAGNOSIS — E66.9 OBESITY (BMI 30-39.9): ICD-10-CM

## 2020-08-14 PROCEDURE — 99213 OFFICE O/P EST LOW 20 MIN: CPT | Performed by: OBSTETRICS & GYNECOLOGY

## 2020-08-14 ASSESSMENT — FIBROSIS 4 INDEX: FIB4 SCORE: 0.36

## 2020-08-14 NOTE — PROGRESS NOTES
GYN Visit  CC: follow up     Isa Mcclure is a 25 y.o.  who presents today for follow-up after IUD placement.  Patient has symptomatic fibroid uterus with heavy menses and suspected endometriosis.  Patient reports that since being seen last she has been happy with the IUD.  She has, however had slight cramping and spotting daily since having it placed.  Also has noticed some moodiness and acne is unsure if this is related to the IUD.  She reports that overall she is happy with the IUD and would like to keep it in place.  Thinks that she is getting used to it is all the symptoms have gotten better as time is progressed.  Denies any other complaints or complaints today     OB History:    OB History    Para Term  AB Living   3 2 2   1 2   SAB TAB Ectopic Molar Multiple Live Births   1         2      # Outcome Date GA Lbr Reji/2nd Weight Sex Delivery Anes PTL Lv   3 Term 16 39w0d  3.033 kg (6 lb 11 oz) M Vag-Spont EPI N REBECA      Birth Comments: Pt states no complications.    2 Term 01/09/15 37w0d  2.466 kg (5 lb 7 oz) F Vag-Spont None N REBECA      Birth Comments: Pt states no complications.    1 SAB               Birth Comments: Pt states no D/C needed.        Review of Systems:   Pertinent positives documented in HPI and all other systems reviewed & are negative.    All PMH, PSH, allergies, social history and FH reviewed and updated today:  Past Medical History:  Past Medical History:   Diagnosis Date   • Allergy    • Anxiety     Used Klonopin   • Asthma    • Depression    • Drug abuse, IV (HCC)    • Migraine    • Substance abuse ([facility])     Methamphetamine and MJ - clean 4 1/2 years   • Urinary tract infection        Past Surgical History:  Past Surgical History:   Procedure Laterality Date   • CHOLECYSTECTOMY         Medications:   Current Outpatient Medications Ordered in Epic   Medication Sig Dispense Refill   • ergocalciferol (DRISDOL) 22979 UNIT capsule Take 1 Cap by mouth every 7  days. 5 Cap 3   • albuterol 108 (90 Base) MCG/ACT Aero Soln inhalation aerosol Inhale 2 Puffs by mouth every 6 hours as needed for Shortness of Breath. 8.5 g 3   • buPROPion (WELLBUTRIN XL) 150 MG XL tablet Take 1 Tab by mouth every morning. 30 Tab 6     No current Epic-ordered facility-administered medications on file.        Allergies: Bactrim ds and Hydrocodone    Social History:  Social History     Socioeconomic History   • Marital status:      Spouse name: Not on file   • Number of children: Not on file   • Years of education: Not on file   • Highest education level: 12th grade   Occupational History   • Not on file   Social Needs   • Financial resource strain: Not very hard   • Food insecurity     Worry: Never true     Inability: Never true   • Transportation needs     Medical: No     Non-medical: No   Tobacco Use   • Smoking status: Current Some Day Smoker     Types: Cigarettes   • Smokeless tobacco: Never Used   • Tobacco comment: 2 times a week   Substance and Sexual Activity   • Alcohol use: Not Currently     Frequency: Monthly or less     Drinks per session: 1 or 2     Binge frequency: Never   • Drug use: No     Comment: hx of meth and cannabis use, sober since 2015   • Sexual activity: Not Currently     Partners: Male     Comment: none   Lifestyle   • Physical activity     Days per week: 1 day     Minutes per session: 50 min   • Stress: Very much   Relationships   • Social connections     Talks on phone: Never     Gets together: Never     Attends Islam service: Never     Active member of club or organization: No     Attends meetings of clubs or organizations: Never     Relationship status:    • Intimate partner violence     Fear of current or ex partner: Not on file     Emotionally abused: Not on file     Physically abused: Not on file     Forced sexual activity: Not on file   Other Topics Concern   • Not on file   Social History Narrative   • Not on file       Family History:  Family  "History   Problem Relation Age of Onset   • Hypertension Mother    • Diabetes Mother    • Psychiatric Illness Mother    • Hyperlipidemia Mother    • Heart Disease Mother 60        MI   • Hypertension Father    • Hyperlipidemia Father    • Psychiatric Illness Brother         depression   • No Known Problems Sister    • No Known Problems Maternal Aunt    • No Known Problems Brother    • No Known Problems Brother    • No Known Problems Sister    • No Known Problems Sister    • Hypertension Paternal Grandmother            Objective:   Vitals:  /66 (BP Location: Left arm, Patient Position: Sitting)   Ht 1.499 m (4' 11\")   Wt 75.3 kg (166 lb)   Body mass index is 33.53 kg/m². (Goal BM I>18 <25)    Physical Exam:   Nursing note and vitals reviewed.  GENERAL: No acute distress  HENT: Atraumatic, normocephalic  EYES: Extraocular movements intact, pupils equal and reactive to light  NECK: Supple, Full ROM  CHEST: No deformities, Equal chest expansion  RESP: Unlabored, no stridor or audible wheeze  ABD: Soft, Nontender, Non-Distended  Extremities: No Clubbing, Cyanosis, or Edema  Skin: Warm/dry, without rases  Neuro: A/O x 4, CN 2-12 Grossly intact, Motor/sensory grossly intact  Psych: Normal mood, behavior, and affect    Genitourinary:  Normal appearing external female genitalia without any rashes, lesions, labial fusion or tenderness.  Vagina is pink moist and well rugated.  Scant bleeding present within vaginal vault.  Cervix well visualized without masses or lesions.  String seen at off and palpated on bimanual exam.  IUD feels to be in appropriate location.    Assessment/Plan:   Isa Mcclure is a 25 y.o.  female who presents for:    1. Obesity (BMI 30-39.9)     2. Menorrhagia with regular cycle     3. Painful menstruation     4. IUD (intrauterine device) in place       #A UB-heavy and painful menses now with IUD IUD.  Patient desires to keep IUD.  She will return to clinic in 3 to 6 months to touch " base if she has any further concerns or desires to be seen however otherwise will return for annual exam.  #Obesity.  Dietary and exercise modifications are discussed with patient today.  #Follow up.  RTC in 3- 6 months or sooner if concerns or questions arise.    Patient was seen for 15 minutes of which > 50% of appointment time was spent on face-to-face counseling and coordination of care regarding the above.

## 2020-08-14 NOTE — PROGRESS NOTES
GYN visit follow up  Had mirena IUD placement on 07/30/20   Pt states she has been having spotting and cramping everyday since the placement of IUD, breaking out, and olvera.  Good # [phone]

## 2020-09-10 ENCOUNTER — OFFICE VISIT (OUTPATIENT)
Dept: MEDICAL GROUP | Facility: MEDICAL CENTER | Age: 25
End: 2020-09-10
Attending: FAMILY MEDICINE
Payer: MEDICAID

## 2020-09-10 VITALS
HEART RATE: 76 BPM | HEIGHT: 59 IN | OXYGEN SATURATION: 97 % | SYSTOLIC BLOOD PRESSURE: 118 MMHG | WEIGHT: 167.4 LBS | BODY MASS INDEX: 33.75 KG/M2 | TEMPERATURE: 97.7 F | DIASTOLIC BLOOD PRESSURE: 74 MMHG

## 2020-09-10 DIAGNOSIS — D21.9 FIBROID: ICD-10-CM

## 2020-09-10 DIAGNOSIS — E66.9 CLASS 1 OBESITY IN ADULT, UNSPECIFIED BMI, UNSPECIFIED OBESITY TYPE, UNSPECIFIED WHETHER SERIOUS COMORBIDITY PRESENT: ICD-10-CM

## 2020-09-10 DIAGNOSIS — F41.8 DEPRESSION WITH ANXIETY: ICD-10-CM

## 2020-09-10 PROCEDURE — 99214 OFFICE O/P EST MOD 30 MIN: CPT | Performed by: FAMILY MEDICINE

## 2020-09-10 RX ORDER — ESCITALOPRAM OXALATE 10 MG/1
10 TABLET ORAL DAILY
Qty: 30 TAB | Refills: 3 | Status: SHIPPED | OUTPATIENT
Start: 2020-09-10 | End: 2021-01-12 | Stop reason: SDUPTHER

## 2020-09-10 ASSESSMENT — FIBROSIS 4 INDEX: FIB4 SCORE: 0.36

## 2020-09-10 NOTE — PROGRESS NOTES
Chief Complaint:   Chief Complaint   Patient presents with   • Follow-Up     anxiety and depression    • Obesity       HPI: Established patient  Isa Mcclure is a 25 y.o. female who presents for follow up      Depression with anxiety  Patient reports that her depression and anxiety are not well controlled on Wellbutrin and she developed some side effects of the medication that she did not like she felt very dizzy and uncomfortable, so she is requesting to change to Lexapro that she used in the past and it helped her anxiety a lot.  Patient said because she did not like the effect of Wellbutrin so she tapered off the medication gradually and she is not taking it anymore.  Denies red flag symptoms like suicidal ideation or intentions to harm self or others.    Class 1 obesity in adult, BMI 33.8  Patient is having concerns about her weight gain and she would like to explore more options of weight loss.  She said her mindset is ready and she would like me to discuss with her how she can lose weight.  BMI 33.8.     Fibroid    Patient was referred to see gynecology because of painful menstruation and fibroid in the fundus of the uterus, she said she has already established with gynecology and she has IUD placed in her uterus, she has been bleeding less and she is having less pain at this time.  No other concerns.      Past medical history, family history, social history and medications reviewed and updated in the record. Today   Current medications, problem list and allergies reviewed in Epic today   Health maintenance topics are reviewed and updated.    Patient Active Problem List    Diagnosis Date Noted   • Painful menstruation 08/14/2020   • Heavy menses 07/30/2020   • Methamphetamine use disorder, mild, in sustained remission (HCC) 09/13/2019   • Amenorrhea 08/27/2019   • Migraine without aura and without status migrainosus, not intractable 05/29/2019   • Eczema 04/17/2019   • Obesity (BMI 30-39.9) 03/27/2018    • Moderate episode of recurrent major depressive disorder (HCC) 01/13/2017   • PTSD (post-traumatic stress disorder) 01/13/2017     Family History   Problem Relation Age of Onset   • Hypertension Mother    • Diabetes Mother    • Psychiatric Illness Mother    • Hyperlipidemia Mother    • Heart Disease Mother 60        MI   • Hypertension Father    • Hyperlipidemia Father    • Psychiatric Illness Brother         depression   • No Known Problems Sister    • No Known Problems Maternal Aunt    • No Known Problems Brother    • No Known Problems Brother    • No Known Problems Sister    • No Known Problems Sister    • Hypertension Paternal Grandmother      Social History     Socioeconomic History   • Marital status:      Spouse name: Not on file   • Number of children: Not on file   • Years of education: Not on file   • Highest education level: 12th grade   Occupational History   • Not on file   Social Needs   • Financial resource strain: Not very hard   • Food insecurity     Worry: Never true     Inability: Never true   • Transportation needs     Medical: No     Non-medical: No   Tobacco Use   • Smoking status: Current Some Day Smoker     Types: Cigarettes   • Smokeless tobacco: Never Used   • Tobacco comment: 2 times a week   Substance and Sexual Activity   • Alcohol use: Not Currently     Frequency: Monthly or less     Drinks per session: 1 or 2     Binge frequency: Never   • Drug use: No     Comment: hx of meth and cannabis use, sober since 2015   • Sexual activity: Not Currently     Partners: Male     Comment: none   Lifestyle   • Physical activity     Days per week: 1 day     Minutes per session: 50 min   • Stress: Very much   Relationships   • Social connections     Talks on phone: Never     Gets together: Never     Attends Rastafarian service: Never     Active member of club or organization: No     Attends meetings of clubs or organizations: Never     Relationship status:    • Intimate partner  "violence     Fear of current or ex partner: Not on file     Emotionally abused: Not on file     Physically abused: Not on file     Forced sexual activity: Not on file   Other Topics Concern   • Not on file   Social History Narrative   • Not on file     Current Outpatient Medications   Medication Sig Dispense Refill   • escitalopram (LEXAPRO) 10 MG Tab Take 1 Tab by mouth every day. 30 Tab 3   • ergocalciferol (DRISDOL) 35667 UNIT capsule Take 1 Cap by mouth every 7 days. 5 Cap 3   • albuterol 108 (90 Base) MCG/ACT Aero Soln inhalation aerosol Inhale 2 Puffs by mouth every 6 hours as needed for Shortness of Breath. 8.5 g 3   • buPROPion (WELLBUTRIN XL) 150 MG XL tablet Take 1 Tab by mouth every morning. 30 Tab 6     No current facility-administered medications for this visit.            Review Of Systems  As documented in HPI above  PHYSICAL EXAMINATION:    /74 (BP Location: Left arm, Patient Position: Sitting, BP Cuff Size: Adult)   Pulse 76   Temp 36.5 °C (97.7 °F) (Temporal)   Ht 1.499 m (4' 11\")   Wt 75.9 kg (167 lb 6.4 oz)   SpO2 97%   BMI 33.81 kg/m²   Gen.: Well-developed, well-nourished, no apparent distress, pleasant and cooperative with the examination  HEENT: Normocephalic/atraumatic,     Neck: No JVD or bruits, no adenopathy  Cor: Regular rate and rhythm without murmur gallop or rub  Lungs: Clear to auscultation with equal breath sounds bilaterally. No wheezes, rhonchi.  Abdomen: Soft nontender without hepatosplenomegaly or masses appreciated, normoactive bowel sounds  Extremities: No cyanosis, clubbing or edema        ASSESSMENT/Plan:    1. Depression with anxiety   chronic problem, not well controlled on Wellbutrin.  Patient stopped taking the medication already.  Advised to start Lexapro and follow-up with me in 2 weeks, no red flag symptoms    escitalopram (LEXAPRO) 10 MG Tab   2. Class 1 obesity in adult, unspecified BMI, unspecified obesity type, unspecified whether serious comorbidity " present   discussed with the patient to do blood work before coming for complete visit for weight loss management and obesity counseling in 2 weeks.  Advised to download free kendrick my fitness pal to start logging and keeping a journal for her food diary.  We will follow-up in 2 weeks for further discussion  CBC WITH DIFFERENTIAL    Comp Metabolic Panel    Lipid Profile    TSH    FREE THYROXINE   3. Fibroid   chronic problem, better control of the symptoms of pain and bleeding after she had IUD placed by gynecology.  No further action at this time.       Please note that this dictation was created using voice recognition software. I have made every reasonable attempt to correct obvious errors but there may be errors of grammar and content that I may have overlooked prior to finalization of this note.

## 2021-01-11 SDOH — ECONOMIC STABILITY: INCOME INSECURITY: IN THE LAST 12 MONTHS, WAS THERE A TIME WHEN YOU WERE NOT ABLE TO PAY THE MORTGAGE OR RENT ON TIME?: NO

## 2021-01-11 SDOH — ECONOMIC STABILITY: HOUSING INSECURITY: IN THE LAST 12 MONTHS, HOW MANY PLACES HAVE YOU LIVED?: 1

## 2021-01-11 SDOH — HEALTH STABILITY: PHYSICAL HEALTH: ON AVERAGE, HOW MANY DAYS PER WEEK DO YOU ENGAGE IN MODERATE TO STRENUOUS EXERCISE (LIKE A BRISK WALK)?: 5 DAYS

## 2021-01-11 SDOH — HEALTH STABILITY: PHYSICAL HEALTH: ON AVERAGE, HOW MANY MINUTES DO YOU ENGAGE IN EXERCISE AT THIS LEVEL?: 30 MINUTES

## 2021-01-11 ASSESSMENT — SOCIAL DETERMINANTS OF HEALTH (SDOH)
IN A TYPICAL WEEK, HOW MANY TIMES DO YOU TALK ON THE PHONE WITH FAMILY, FRIENDS, OR NEIGHBORS?: THREE TIMES A WEEK
HOW OFTEN DO YOU ATTEND CHURCH OR RELIGIOUS SERVICES?: NEVER
HOW MANY DRINKS CONTAINING ALCOHOL DO YOU HAVE ON A TYPICAL DAY WHEN YOU ARE DRINKING: DECLINE
HOW OFTEN DO YOU GET TOGETHER WITH FRIENDS OR RELATIVES?: NEVER
HOW OFTEN DO YOU HAVE A DRINK CONTAINING ALCOHOL: NEVER
WITHIN THE PAST 12 MONTHS, YOU WORRIED THAT YOUR FOOD WOULD RUN OUT BEFORE YOU GOT THE MONEY TO BUY MORE: NEVER TRUE
HOW OFTEN DO YOU ATTENT MEETINGS OF THE CLUB OR ORGANIZATION YOU BELONG TO?: DECLINE
WITHIN THE PAST 12 MONTHS, THE FOOD YOU BOUGHT JUST DIDN'T LAST AND YOU DIDN'T HAVE MONEY TO GET MORE: NEVER TRUE
HOW OFTEN DO YOU HAVE SIX OR MORE DRINKS ON ONE OCCASION: NEVER
DO YOU BELONG TO ANY CLUBS OR ORGANIZATIONS SUCH AS CHURCH GROUPS UNIONS, FRATERNAL OR ATHLETIC GROUPS, OR SCHOOL GROUPS?: NO
HOW HARD IS IT FOR YOU TO PAY FOR THE VERY BASICS LIKE FOOD, HOUSING, MEDICAL CARE, AND HEATING?: NOT HARD AT ALL

## 2021-01-12 ENCOUNTER — OFFICE VISIT (OUTPATIENT)
Dept: MEDICAL GROUP | Facility: MEDICAL CENTER | Age: 26
End: 2021-01-12
Attending: PHYSICIAN ASSISTANT
Payer: MEDICAID

## 2021-01-12 VITALS
HEIGHT: 59 IN | RESPIRATION RATE: 16 BRPM | BODY MASS INDEX: 33.36 KG/M2 | WEIGHT: 165.5 LBS | DIASTOLIC BLOOD PRESSURE: 68 MMHG | HEART RATE: 85 BPM | SYSTOLIC BLOOD PRESSURE: 122 MMHG | OXYGEN SATURATION: 98 % | TEMPERATURE: 97.5 F

## 2021-01-12 DIAGNOSIS — Z23 NEED FOR VACCINATION: ICD-10-CM

## 2021-01-12 DIAGNOSIS — Z00.00 HEALTH CARE MAINTENANCE: ICD-10-CM

## 2021-01-12 DIAGNOSIS — F41.9 SEVERE ANXIETY: ICD-10-CM

## 2021-01-12 DIAGNOSIS — F33.2 SEVERE EPISODE OF RECURRENT MAJOR DEPRESSIVE DISORDER, WITHOUT PSYCHOTIC FEATURES (HCC): ICD-10-CM

## 2021-01-12 PROCEDURE — 90686 IIV4 VACC NO PRSV 0.5 ML IM: CPT | Performed by: PHYSICIAN ASSISTANT

## 2021-01-12 PROCEDURE — 99213 OFFICE O/P EST LOW 20 MIN: CPT | Performed by: PHYSICIAN ASSISTANT

## 2021-01-12 PROCEDURE — 99214 OFFICE O/P EST MOD 30 MIN: CPT | Performed by: PHYSICIAN ASSISTANT

## 2021-01-12 RX ORDER — ESCITALOPRAM OXALATE 10 MG/1
10 TABLET ORAL DAILY
Qty: 30 TAB | Refills: 1 | Status: SHIPPED | OUTPATIENT
Start: 2021-01-12 | End: 2021-02-16

## 2021-01-12 RX ORDER — NITROFURANTOIN 25; 75 MG/1; MG/1
CAPSULE ORAL
COMMUNITY
Start: 2020-11-06 | End: 2021-06-14

## 2021-01-12 ASSESSMENT — ENCOUNTER SYMPTOMS
NAUSEA: 0
CLAUDICATION: 0
WEIGHT LOSS: 0
DIZZINESS: 0
COUGH: 0
TINGLING: 0
WEAKNESS: 0
DEPRESSION: 1
INSOMNIA: 1
CHILLS: 0
BLOOD IN STOOL: 0
FEVER: 0
SHORTNESS OF BREATH: 0
PALPITATIONS: 0
NERVOUS/ANXIOUS: 1
HEADACHES: 0
DIARRHEA: 0
CONSTIPATION: 0
VOMITING: 0
WHEEZING: 0

## 2021-01-12 ASSESSMENT — LIFESTYLE VARIABLES: SUBSTANCE_ABUSE: 0

## 2021-01-12 ASSESSMENT — PATIENT HEALTH QUESTIONNAIRE - PHQ9
9. THOUGHTS THAT YOU WOULD BE BETTER OFF DEAD, OR OF HURTING YOURSELF: SEVERAL DAYS
SUM OF ALL RESPONSES TO PHQ QUESTIONS 1-9: 25
5. POOR APPETITE OR OVEREATING: NEARLY EVERY DAY
2. FEELING DOWN, DEPRESSED, IRRITABLE, OR HOPELESS: NEARLY EVERY DAY
4. FEELING TIRED OR HAVING LITTLE ENERGY: NEARLY EVERY DAY
6. FEELING BAD ABOUT YOURSELF - OR THAT YOU ARE A FAILURE OR HAVE LET YOURSELF OR YOUR FAMILY DOWN: NEARLY EVERY DAY
7. TROUBLE CONCENTRATING ON THINGS, SUCH AS READING THE NEWSPAPER OR WATCHING TELEVISION: NEARLY EVERY DAY
SUM OF ALL RESPONSES TO PHQ9 QUESTIONS 1 AND 2: 6
3. TROUBLE FALLING OR STAYING ASLEEP OR SLEEPING TOO MUCH: NEARLY EVERY DAY
8. MOVING OR SPEAKING SO SLOWLY THAT OTHER PEOPLE COULD HAVE NOTICED. OR THE OPPOSITE, BEING SO FIGETY OR RESTLESS THAT YOU HAVE BEEN MOVING AROUND A LOT MORE THAN USUAL: NEARLY EVERY DAY
1. LITTLE INTEREST OR PLEASURE IN DOING THINGS: NEARLY EVERY DAY

## 2021-01-12 ASSESSMENT — FIBROSIS 4 INDEX: FIB4 SCORE: 0.36

## 2021-01-12 NOTE — PROGRESS NOTES
Chief Complaint   Patient presents with   • Depression   • Anxiety   • Establish Care     Subjective:     HPI:   Isa Mcclure is a 25 y.o. female here to establish care  and to discuss the evaluation and management of:    Severe episode of recurrent major depressive disorder (HCC)  Isa presents today for evaluation of depressed mood and/or loss of interests/pleasure most days, usually >2 days/week. There is no imminent risk of serious self-harm/suicide. PHQ9 obtained in clinic today revealed a score of 25 indicating severe depression. No medications, comorbid medical conditions or substance abuse causing depression. Has tried Wellbutrin, Zoloft and Lexapro in the past. She reports that she felt the Lexapro worked best for her previously.  Denies hx of current stanley, hypomania or psychosis. FMHx of severe anxiety and depression.     Severe anxiety  Isa presents today for evaluation of excessive anxiety and worry that has occurred more often than not for >/= 6 months. Patient is easily fatigued, difficulty concentrating or mind going blank, restlessness or feeling keyed up or on edge, irritability, muscle tension and sleep disturbances. This has affected her social/occupational areas of functioning. BRAYAN 7 performed in clinic today with a score of 17 indicating severe anxiety.   There is no imminent risk of serious self-harm/suicide. No current substance abuse. No Hx of known thyroid disorder and no hx of psychiatric conditions.    ROS  Review of Systems   Constitutional: Negative for chills, fever, malaise/fatigue and weight loss.   Respiratory: Negative for cough, shortness of breath and wheezing.    Cardiovascular: Negative for chest pain, palpitations, claudication and leg swelling.   Gastrointestinal: Negative for blood in stool, constipation, diarrhea, melena, nausea and vomiting.   Genitourinary: Negative for dysuria, frequency, hematuria and urgency.   Neurological: Negative for dizziness, tingling,  weakness and headaches.   Psychiatric/Behavioral: Positive for depression. Negative for substance abuse and suicidal ideas. The patient is nervous/anxious and has insomnia.        Allergies   Allergen Reactions   • Bactrim Ds Hives     hives   • Hydrocodone Itching and Nausea       Current medicines (including changes today)  Current Outpatient Medications   Medication Sig Dispense Refill   • escitalopram (LEXAPRO) 10 MG Tab Take 1 Tab by mouth every day. 30 Tab 1   • albuterol 108 (90 Base) MCG/ACT Aero Soln inhalation aerosol Inhale 2 Puffs by mouth every 6 hours as needed for Shortness of Breath. 8.5 g 3   • nitrofurantoin (MACROBID) 100 MG Cap TAKE 1 CAPSULE BY MOUTH TWICE DAILY FOR 7 DAYS       No current facility-administered medications for this visit.      She  has a past medical history of Allergy, Anxiety, Asthma, Depression, Drug abuse, IV ([facility]), Migraine, Substance abuse ([facility]), and Urinary tract infection. She also has no past medical history of Addisons disease ([facility]), Adrenal disorder ([facility]), Anemia, Arrhythmia, Arthritis, Blood transfusion without reported diagnosis, Cancer ([facility]), Cataract, CHF (congestive heart failure) ([facility]), Clotting disorder ([facility]), COPD (chronic obstructive pulmonary disease) ([facility]), Cushings syndrome ([facility]), Diabetes ([facility]), Diabetic neuropathy ([facility]), GERD (gastroesophageal reflux disease), Glaucoma, Goiter, Head ache, Heart attack ([facility]), Heart murmur, HIV (human immunodeficiency virus infection) ([facility]), Hyperlipidemia, Hypertension, IBD (inflammatory bowel disease), Kidney disease, Meningitis, Muscle disorder, Osteoporosis, Parathyroid disorder ([facility]), Pituitary disease ([facility]), Pulmonary emphysema ([facility]), Seizure ([facility]), Sickle cell disease ([facility]), Stroke ([facility]), Thyroid disease, or Tuberculosis.  She  has a past surgical history that includes cholecystectomy.  Social History     Tobacco Use   • Smoking status: Former Smoker     Types: Cigarettes   • Smokeless tobacco: Never Used  "  Substance Use Topics   • Alcohol use: Not Currently     Frequency: Never     Drinks per session: Patient refused     Binge frequency: Never   • Drug use: Yes     Types: Marijuana     Comment: hx of meth and cannabis use, sober since        Family History   Problem Relation Age of Onset   • Hypertension Mother    • Diabetes Mother    • Psychiatric Illness Mother    • Hyperlipidemia Mother    • Heart Disease Mother 60        MI   • Hypertension Father    • Hyperlipidemia Father    • Psychiatric Illness Brother         depression   • No Known Problems Sister    • No Known Problems Maternal Aunt    • No Known Problems Brother    • No Known Problems Brother    • No Known Problems Sister    • No Known Problems Sister    • Hypertension Paternal Grandmother      Family Status   Relation Name Status   • Mo  Alive   • Fa  Alive   • Bro  Alive   • Sis  Alive   • MAunt  Alive   • Bro  Alive   • Bro  Alive   • Sis  Alive   • Sis  Alive   • Son  Alive, age 4y   • Jensen  Alive, age 5y   • OTHER  Alive        Mom Aunt , breast ca   • MGMo     • MGFa     • PGMo  Alive   • PGFa         Patient Active Problem List    Diagnosis Date Noted   • Severe anxiety 2021   • Painful menstruation 2020   • Heavy menses 2020   • Methamphetamine use disorder, mild, in sustained remission (HCC) 2019   • Amenorrhea 2019   • Migraine without aura and without status migrainosus, not intractable 2019   • Eczema 2019   • Obesity (BMI 30-39.9) 2018   • Severe episode of recurrent major depressive disorder (HCC) 2017   • PTSD (post-traumatic stress disorder) 2017        Objective:     /68 (BP Location: Left arm, Patient Position: Sitting, BP Cuff Size: Adult)   Pulse 85   Temp 36.4 °C (97.5 °F)   Resp 16   Ht 1.499 m (4' 11\")   Wt 75.1 kg (165 lb 8 oz)   SpO2 98%  Body mass index is 33.43 kg/m².    Physical Exam:  Physical Exam   Constitutional: She is " oriented to person, place, and time and well-developed, well-nourished, and in no distress.   HENT:   Head: Normocephalic.   Right Ear: External ear normal.   Left Ear: External ear normal.   Eyes: Pupils are equal, round, and reactive to light.   Neck: Normal range of motion. No thyromegaly present.   Cardiovascular: Normal rate, regular rhythm and normal heart sounds.   Pulmonary/Chest: Effort normal and breath sounds normal.   Musculoskeletal: Normal range of motion.   Lymphadenopathy:     She has no cervical adenopathy.        Right: No supraclavicular adenopathy present.        Left: No supraclavicular adenopathy present.   Neurological: She is alert and oriented to person, place, and time. Gait normal.   Skin: Skin is warm and dry.   Psychiatric: Affect and judgment normal.        Assessment and Plan:     The following treatment plan was discussed:    1. Severe episode of recurrent major depressive disorder, without psychotic features (HCC)/Severe anxiety  - This is a chronic condition.   - No imminent risk of suicidal or homicidal ideation.   - PHQ-9 performed in clinic today revealed a score of 25 indicating severe depression and a BRAYAN-7 score of 17 indicating severe anxiety.  - The patient would benefit greatly from combination therapy with a medication and CBT.    - She has been on Lexapro in the past and had great success while being on the medication.  - Plan: We will start her back on 10 mg of Lexapro daily.  REFERRAL TO PSYCHOLOGY for CBT has been placed.   - Patient has been educated on the use and potential side effect profile of the medication.   - Follow up in 5 weeks for med check.    2. Health care maintenance  - Patient has been instructed to complete labs that were ordered in September in addition to the labs below before next follow up visit.   - VITAMIN D,25 HYDROXY; Future  - VITAMIN B12; Future    3. Need for vaccination  - Influenza Vaccine Quad Injection (PF)    Any change or worsening of  signs or symptoms, patient encouraged to follow-up or report to emergency room for further evaluation. Patient verbalizes understanding and agrees.    Follow-Up: Return in about 5 weeks (around 2/16/2021) for Med check.      PLEASE NOTE: This dictation was created using voice recognition software. I have made every reasonable attempt to correct obvious errors, but I expect that there are errors of grammar and possibly content that I did not discover before finalizing the note.

## 2021-01-12 NOTE — ASSESSMENT & PLAN NOTE
Isa presents today for evaluation of excessive anxiety and worry that has occurred more often than not for >/= 6 months. Patient is easily fatigued, difficulty concentrating or mind going blank, restlessness or feeling keyed up or on edge, irritability, muscle tension and sleep disturbances. This has affected her social/occupational areas of functioning. BRAYAN 7 performed in clinic today with a score of 17 indicating severe anxiety.   There is no imminent risk of serious self-harm/suicide. No current substance abuse. No Hx of known thyroid disorder and no hx of psychiatric conditions.

## 2021-01-12 NOTE — ASSESSMENT & PLAN NOTE
Isa presents today for evaluation of depressed mood and/or loss of interests/pleasure most days, usually >2 days/week. There is no imminent risk of serious self-harm/suicide. PHQ9 obtained in clinic today revealed a score of 25 indicating severe depression. No medications, comorbid medical conditions or substance abuse causing depression. Has tried Wellbutrin, Zoloft and Lexapro in the past. She reports that she felt the Lexapro worked best for her previously.  Denies hx of current stanley, hypomania or psychosis. FMHx of severe anxiety and depression.

## 2021-01-19 ENCOUNTER — NON-PROVIDER VISIT (OUTPATIENT)
Dept: OCCUPATIONAL MEDICINE | Facility: CLINIC | Age: 26
End: 2021-01-19

## 2021-01-19 DIAGNOSIS — Z23 NEED FOR MMR VACCINE: Primary | ICD-10-CM

## 2021-01-19 PROCEDURE — 86580 TB INTRADERMAL TEST: CPT | Performed by: NURSE PRACTITIONER

## 2021-01-21 ENCOUNTER — NON-PROVIDER VISIT (OUTPATIENT)
Dept: OCCUPATIONAL MEDICINE | Facility: CLINIC | Age: 26
End: 2021-01-21

## 2021-01-21 LAB — TB WHEAL 3D P 5 TU DIAM: NORMAL MM

## 2021-01-27 ENCOUNTER — NON-PROVIDER VISIT (OUTPATIENT)
Dept: OCCUPATIONAL MEDICINE | Facility: CLINIC | Age: 26
End: 2021-01-27

## 2021-01-27 DIAGNOSIS — Z11.1 ENCOUNTER FOR PPD TEST: ICD-10-CM

## 2021-01-27 PROCEDURE — 86580 TB INTRADERMAL TEST: CPT | Performed by: PREVENTIVE MEDICINE

## 2021-01-29 ENCOUNTER — NON-PROVIDER VISIT (OUTPATIENT)
Dept: OCCUPATIONAL MEDICINE | Facility: CLINIC | Age: 26
End: 2021-01-29

## 2021-01-29 LAB — TB WHEAL 3D P 5 TU DIAM: NORMAL MM

## 2021-02-16 DIAGNOSIS — F33.2 SEVERE EPISODE OF RECURRENT MAJOR DEPRESSIVE DISORDER, WITHOUT PSYCHOTIC FEATURES (HCC): ICD-10-CM

## 2021-02-16 DIAGNOSIS — F41.9 SEVERE ANXIETY: ICD-10-CM

## 2021-02-16 RX ORDER — ESCITALOPRAM OXALATE 20 MG/1
20 TABLET ORAL DAILY
Qty: 30 TABLET | Refills: 1 | Status: SHIPPED | OUTPATIENT
Start: 2021-02-16 | End: 2021-06-14

## 2021-03-31 ENCOUNTER — GYNECOLOGY VISIT (OUTPATIENT)
Dept: OBGYN | Facility: CLINIC | Age: 26
End: 2021-03-31
Payer: MEDICAID

## 2021-03-31 ENCOUNTER — HOSPITAL ENCOUNTER (OUTPATIENT)
Facility: MEDICAL CENTER | Age: 26
End: 2021-03-31
Attending: OBSTETRICS & GYNECOLOGY
Payer: MEDICAID

## 2021-03-31 VITALS — BODY MASS INDEX: 32.32 KG/M2 | DIASTOLIC BLOOD PRESSURE: 70 MMHG | WEIGHT: 160 LBS | SYSTOLIC BLOOD PRESSURE: 104 MMHG

## 2021-03-31 DIAGNOSIS — N89.8 VAGINAL DISCHARGE: ICD-10-CM

## 2021-03-31 DIAGNOSIS — R10.2 PELVIC PAIN: ICD-10-CM

## 2021-03-31 PROCEDURE — 87660 TRICHOMONAS VAGIN DIR PROBE: CPT

## 2021-03-31 PROCEDURE — 99213 OFFICE O/P EST LOW 20 MIN: CPT | Performed by: OBSTETRICS & GYNECOLOGY

## 2021-03-31 PROCEDURE — 87480 CANDIDA DNA DIR PROBE: CPT

## 2021-03-31 PROCEDURE — 87510 GARDNER VAG DNA DIR PROBE: CPT

## 2021-03-31 RX ORDER — IBUPROFEN 800 MG/1
800 TABLET ORAL EVERY 8 HOURS PRN
Qty: 30 TABLET | Refills: 4 | Status: SHIPPED | OUTPATIENT
Start: 2021-03-31 | End: 2021-06-14

## 2021-03-31 RX ORDER — ACETAMINOPHEN AND CODEINE PHOSPHATE 120; 12 MG/5ML; MG/5ML
1 SOLUTION ORAL DAILY
Qty: 30 TABLET | Refills: 11 | Status: SHIPPED | OUTPATIENT
Start: 2021-03-31 | End: 2021-06-14

## 2021-03-31 ASSESSMENT — FIBROSIS 4 INDEX: FIB4 SCORE: 0.36

## 2021-03-31 NOTE — NON-PROVIDER
Pt here for IUD issues  Pt states feels she has been having more vaginal infections since insertion of IUD 7/30/20  Good#[phone]  Pharmacy verified

## 2021-03-31 NOTE — PROGRESS NOTES
Chief Complaint   Patient presents with   • Gynecologic Exam       History of present illness: 25 y.o.  No LMP recorded. (Menstrual status: Irregular Menses). presents with cramping with IUD and increased vaginal discharge.  No new partners.  Has a foul smell.  Had heavy cramping prior to IUD but has not improved.  The IUD has helped heavy menses and mood changes from other bc options.  Is happy over all.     Review of systems:  Pertinent positives documented in HPI and all other systems reviewed & are negative    Past OB History:   OB History    Para Term  AB Living   3 2 2 0 1 2   SAB TAB Ectopic Molar Multiple Live Births   1 0 0 0 0 2       Past Gynecological History  No LMP recorded. (Menstrual status: Irregular Menses).  See previous    All PMH, PSH, allergies, social history and FH reviewed and updated today:  Past Medical History:   Diagnosis Date   • Allergy    • Anxiety     Used Klonopin   • Asthma    • Depression    • Drug abuse, IV (HCC)    • Migraine    • Substance abuse ([facility])     Methamphetamine and MJ - clean 4 1/2 years   • Urinary tract infection        Past Surgical History:   Procedure Laterality Date   • CHOLECYSTECTOMY         Allergies:   Allergies   Allergen Reactions   • Bactrim Ds Hives     hives   • Hydrocodone Itching and Nausea       Social History     Socioeconomic History   • Marital status:      Spouse name: Not on file   • Number of children: Not on file   • Years of education: Not on file   • Highest education level: 12th grade   Occupational History   • Not on file   Tobacco Use   • Smoking status: Former Smoker     Types: Cigarettes   • Smokeless tobacco: Never Used   Substance and Sexual Activity   • Alcohol use: Not Currently   • Drug use: Yes     Types: Marijuana     Comment: hx of meth and cannabis use, sober since    • Sexual activity: Yes     Partners: Male     Comment: none   Other Topics Concern   • Not on file   Social History Narrative   •  Not on file     Social Determinants of Health     Financial Resource Strain: Low Risk    • Difficulty of Paying Living Expenses: Not hard at all   Food Insecurity: No Food Insecurity   • Worried About Running Out of Food in the Last Year: Never true   • Ran Out of Food in the Last Year: Never true   Transportation Needs: No Transportation Needs   • Lack of Transportation (Medical): No   • Lack of Transportation (Non-Medical): No   Physical Activity: Sufficiently Active   • Days of Exercise per Week: 5 days   • Minutes of Exercise per Session: 30 min   Stress: Stress Concern Present   • Feeling of Stress : Very much   Social Connections: Moderately Isolated   • Frequency of Communication with Friends and Family: Three times a week   • Frequency of Social Gatherings with Friends and Family: Never   • Attends Denominational Services: Never   • Active Member of Clubs or Organizations: No   • Attends Club or Organization Meetings: Patient refused   • Marital Status:    Intimate Partner Violence:    • Fear of Current or Ex-Partner:    • Emotionally Abused:    • Physically Abused:    • Sexually Abused:        Family History   Problem Relation Age of Onset   • Hypertension Mother    • Diabetes Mother    • Psychiatric Illness Mother    • Hyperlipidemia Mother    • Heart Disease Mother 60        MI   • Hypertension Father    • Hyperlipidemia Father    • Psychiatric Illness Brother         depression   • No Known Problems Sister    • No Known Problems Maternal Aunt    • No Known Problems Brother    • No Known Problems Brother    • No Known Problems Sister    • No Known Problems Sister    • Hypertension Paternal Grandmother        Physical exam:  /70   Wt 72.6 kg (160 lb)     General:appears stated age, is in no apparent distress, is well developed and well nourished  Abdomen: Bowel sounds positive, nondistended, soft, nontender x4, no rebound or guarding. No organomegaly. No masses.  Female GYN:normal external  genitalia, some yellowish discharge, not a lot, no pain in pelvic floor in palpation, pain with movement of uterus and of palpation of normal sized ovaries that reproduces pain of cramping. IUD strings in place.   Skin: No rashes, or ulcers or lesions seen  Psychiatric: Patient shows appropriate affect, is alert and oriented x3, intact judgment and insight.      Assessment  25 y.o.  here for cramping pain and increased discharge  No diagnosis found.    Plan  - micronor for ovulation pain  - vaginal pathogens for discharge complaint  - track cramping on kendrick and if in consistent pattern take ibuprofen prior to when it is expected  - Follow up 2-3 months to review improvements.

## 2021-04-01 LAB
CANDIDA DNA VAG QL PROBE+SIG AMP: NEGATIVE
G VAGINALIS DNA VAG QL PROBE+SIG AMP: POSITIVE
T VAGINALIS DNA VAG QL PROBE+SIG AMP: NEGATIVE

## 2021-04-01 RX ORDER — METRONIDAZOLE 500 MG/1
500 TABLET ORAL 2 TIMES DAILY
Qty: 14 TABLET | Refills: 0 | Status: SHIPPED | OUTPATIENT
Start: 2021-04-01 | End: 2021-04-08

## 2021-04-15 ENCOUNTER — EH NON-PROVIDER (OUTPATIENT)
Dept: OCCUPATIONAL MEDICINE | Facility: CLINIC | Age: 26
End: 2021-04-15

## 2021-04-15 ENCOUNTER — HOSPITAL ENCOUNTER (OUTPATIENT)
Facility: MEDICAL CENTER | Age: 26
End: 2021-04-15
Attending: NURSE PRACTITIONER
Payer: COMMERCIAL

## 2021-04-15 ENCOUNTER — IMMUNIZATION (OUTPATIENT)
Dept: OTHER | Facility: MEDICAL CENTER | Age: 26
End: 2021-04-15
Payer: MEDICAID

## 2021-04-15 ENCOUNTER — EMPLOYEE HEALTH (OUTPATIENT)
Dept: OCCUPATIONAL MEDICINE | Facility: CLINIC | Age: 26
End: 2021-04-15

## 2021-04-15 DIAGNOSIS — Z02.89 ENCOUNTER FOR OCCUPATIONAL HEALTH ASSESSMENT: ICD-10-CM

## 2021-04-15 DIAGNOSIS — Z02.1 PRE-EMPLOYMENT DRUG SCREENING: ICD-10-CM

## 2021-04-15 DIAGNOSIS — Z23 ENCOUNTER FOR VACCINATION: Primary | ICD-10-CM

## 2021-04-15 LAB
AMP AMPHETAMINE: NORMAL
BAR BARBITURATES: NORMAL
BZO BENZODIAZEPINES: NORMAL
COC COCAINE: NORMAL
INT CON NEG: NORMAL
INT CON POS: NORMAL
MDMA ECSTASY: NORMAL
MET METHAMPHETAMINES: NORMAL
MTD METHADONE: NORMAL
OPI OPIATES: NORMAL
OXY OXYCODONE: NORMAL
PCP PHENCYCLIDINE: NORMAL
POC URINE DRUG SCREEN OCDRS: NEGATIVE
THC: NORMAL

## 2021-04-15 PROCEDURE — 80305 DRUG TEST PRSMV DIR OPT OBS: CPT | Performed by: NURSE PRACTITIONER

## 2021-04-15 PROCEDURE — 86480 TB TEST CELL IMMUN MEASURE: CPT | Performed by: NURSE PRACTITIONER

## 2021-04-15 PROCEDURE — 91301 MODERNA SARS-COV-2 VACCINE: CPT

## 2021-04-15 PROCEDURE — 0011A MODERNA SARS-COV-2 VACCINE: CPT

## 2021-04-15 PROCEDURE — 8915 PR COMPREHENSIVE PHYSICAL: Performed by: PREVENTIVE MEDICINE

## 2021-04-15 ASSESSMENT — FIBROSIS 4 INDEX: FIB4 SCORE: 0.36

## 2021-04-16 ENCOUNTER — PATIENT MESSAGE (OUTPATIENT)
Dept: OBGYN | Facility: CLINIC | Age: 26
End: 2021-04-16

## 2021-04-19 VITALS
TEMPERATURE: 97.7 F | WEIGHT: 158 LBS | HEIGHT: 59 IN | BODY MASS INDEX: 31.85 KG/M2 | SYSTOLIC BLOOD PRESSURE: 118 MMHG | RESPIRATION RATE: 14 BRPM | DIASTOLIC BLOOD PRESSURE: 76 MMHG | HEART RATE: 80 BPM | OXYGEN SATURATION: 98 %

## 2021-04-19 LAB
GAMMA INTERFERON BACKGROUND BLD IA-ACNC: 0.02 IU/ML
M TB IFN-G BLD-IMP: NEGATIVE
M TB IFN-G CD4+ BCKGRND COR BLD-ACNC: 0 IU/ML
MITOGEN IGNF BCKGRD COR BLD-ACNC: >10 IU/ML
QFT TB2 - NIL TBQ2: 0 IU/ML

## 2021-04-19 RX ORDER — METRONIDAZOLE 7.5 MG/G
1 GEL VAGINAL
Qty: 5 EACH | Refills: 0 | Status: SHIPPED | OUTPATIENT
Start: 2021-04-19 | End: 2021-04-24

## 2021-04-19 RX ORDER — FLUCONAZOLE 150 MG/1
150 TABLET ORAL DAILY
Qty: 1 TABLET | Refills: 0 | Status: SHIPPED | OUTPATIENT
Start: 2021-04-19 | End: 2021-06-14

## 2021-04-21 ENCOUNTER — EH NON-PROVIDER (OUTPATIENT)
Dept: OCCUPATIONAL MEDICINE | Facility: CLINIC | Age: 26
End: 2021-04-21

## 2021-04-21 DIAGNOSIS — Z71.85 IMMUNIZATION COUNSELING: ICD-10-CM

## 2021-05-13 ENCOUNTER — IMMUNIZATION (OUTPATIENT)
Dept: OTHER | Facility: MEDICAL CENTER | Age: 26
End: 2021-05-13
Payer: MEDICAID

## 2021-05-13 DIAGNOSIS — Z23 ENCOUNTER FOR VACCINATION: Primary | ICD-10-CM

## 2021-05-13 PROCEDURE — 0012A MODERNA SARS-COV-2 VACCINE: CPT

## 2021-05-13 PROCEDURE — 91301 MODERNA SARS-COV-2 VACCINE: CPT

## 2021-06-11 SDOH — HEALTH STABILITY: PHYSICAL HEALTH: ON AVERAGE, HOW MANY DAYS PER WEEK DO YOU ENGAGE IN MODERATE TO STRENUOUS EXERCISE (LIKE A BRISK WALK)?: 0 DAYS

## 2021-06-11 SDOH — ECONOMIC STABILITY: HOUSING INSECURITY: IN THE LAST 12 MONTHS, HOW MANY PLACES HAVE YOU LIVED?: 1

## 2021-06-11 SDOH — ECONOMIC STABILITY: FOOD INSECURITY: WITHIN THE PAST 12 MONTHS, THE FOOD YOU BOUGHT JUST DIDN'T LAST AND YOU DIDN'T HAVE MONEY TO GET MORE.: NEVER TRUE

## 2021-06-11 SDOH — HEALTH STABILITY: PHYSICAL HEALTH: ON AVERAGE, HOW MANY MINUTES DO YOU ENGAGE IN EXERCISE AT THIS LEVEL?: 0 MIN

## 2021-06-11 SDOH — ECONOMIC STABILITY: INCOME INSECURITY: HOW HARD IS IT FOR YOU TO PAY FOR THE VERY BASICS LIKE FOOD, HOUSING, MEDICAL CARE, AND HEATING?: NOT HARD AT ALL

## 2021-06-11 SDOH — ECONOMIC STABILITY: INCOME INSECURITY: IN THE LAST 12 MONTHS, WAS THERE A TIME WHEN YOU WERE NOT ABLE TO PAY THE MORTGAGE OR RENT ON TIME?: NO

## 2021-06-11 SDOH — ECONOMIC STABILITY: FOOD INSECURITY: WITHIN THE PAST 12 MONTHS, YOU WORRIED THAT YOUR FOOD WOULD RUN OUT BEFORE YOU GOT MONEY TO BUY MORE.: NEVER TRUE

## 2021-06-11 SDOH — HEALTH STABILITY: MENTAL HEALTH
STRESS IS WHEN SOMEONE FEELS TENSE, NERVOUS, ANXIOUS, OR CAN'T SLEEP AT NIGHT BECAUSE THEIR MIND IS TROUBLED. HOW STRESSED ARE YOU?: TO SOME EXTENT

## 2021-06-11 ASSESSMENT — SOCIAL DETERMINANTS OF HEALTH (SDOH)
HOW OFTEN DO YOU ATTENT MEETINGS OF THE CLUB OR ORGANIZATION YOU BELONG TO?: NEVER
HOW OFTEN DO YOU GET TOGETHER WITH FRIENDS OR RELATIVES?: ONCE A WEEK
HOW OFTEN DO YOU HAVE SIX OR MORE DRINKS ON ONE OCCASION: NEVER
WITHIN THE PAST 12 MONTHS, YOU WORRIED THAT YOUR FOOD WOULD RUN OUT BEFORE YOU GOT THE MONEY TO BUY MORE: NEVER TRUE
IN A TYPICAL WEEK, HOW MANY TIMES DO YOU TALK ON THE PHONE WITH FAMILY, FRIENDS, OR NEIGHBORS?: THREE TIMES A WEEK
HOW OFTEN DO YOU ATTEND CHURCH OR RELIGIOUS SERVICES?: NEVER
HOW HARD IS IT FOR YOU TO PAY FOR THE VERY BASICS LIKE FOOD, HOUSING, MEDICAL CARE, AND HEATING?: NOT HARD AT ALL
HOW OFTEN DO YOU ATTEND CHURCH OR RELIGIOUS SERVICES?: NEVER
DO YOU BELONG TO ANY CLUBS OR ORGANIZATIONS SUCH AS CHURCH GROUPS UNIONS, FRATERNAL OR ATHLETIC GROUPS, OR SCHOOL GROUPS?: NO
DO YOU BELONG TO ANY CLUBS OR ORGANIZATIONS SUCH AS CHURCH GROUPS UNIONS, FRATERNAL OR ATHLETIC GROUPS, OR SCHOOL GROUPS?: NO
HOW OFTEN DO YOU GET TOGETHER WITH FRIENDS OR RELATIVES?: ONCE A WEEK
IN A TYPICAL WEEK, HOW MANY TIMES DO YOU TALK ON THE PHONE WITH FAMILY, FRIENDS, OR NEIGHBORS?: THREE TIMES A WEEK
HOW MANY DRINKS CONTAINING ALCOHOL DO YOU HAVE ON A TYPICAL DAY WHEN YOU ARE DRINKING: 1 OR 2
HOW OFTEN DO YOU HAVE A DRINK CONTAINING ALCOHOL: MONTHLY OR LESS
HOW OFTEN DO YOU ATTENT MEETINGS OF THE CLUB OR ORGANIZATION YOU BELONG TO?: NEVER

## 2021-06-11 ASSESSMENT — LIFESTYLE VARIABLES
HOW MANY STANDARD DRINKS CONTAINING ALCOHOL DO YOU HAVE ON A TYPICAL DAY: 1 OR 2
HOW OFTEN DO YOU HAVE A DRINK CONTAINING ALCOHOL: MONTHLY OR LESS
HOW OFTEN DO YOU HAVE SIX OR MORE DRINKS ON ONE OCCASION: NEVER

## 2021-06-14 ENCOUNTER — OFFICE VISIT (OUTPATIENT)
Dept: MEDICAL GROUP | Facility: LAB | Age: 26
End: 2021-06-14
Payer: COMMERCIAL

## 2021-06-14 VITALS
WEIGHT: 159 LBS | BODY MASS INDEX: 32.05 KG/M2 | SYSTOLIC BLOOD PRESSURE: 124 MMHG | HEART RATE: 61 BPM | DIASTOLIC BLOOD PRESSURE: 76 MMHG | HEIGHT: 59 IN | TEMPERATURE: 97.9 F | RESPIRATION RATE: 12 BRPM | OXYGEN SATURATION: 100 %

## 2021-06-14 DIAGNOSIS — F98.8 ATTENTION DEFICIT DISORDER (ADD) WITHOUT HYPERACTIVITY: ICD-10-CM

## 2021-06-14 PROBLEM — G43.009 MIGRAINE WITHOUT AURA AND WITHOUT STATUS MIGRAINOSUS, NOT INTRACTABLE: Status: RESOLVED | Noted: 2019-05-29 | Resolved: 2021-06-14

## 2021-06-14 PROBLEM — F15.11: Status: RESOLVED | Noted: 2019-09-13 | Resolved: 2021-06-14

## 2021-06-14 PROBLEM — F41.9 SEVERE ANXIETY: Status: RESOLVED | Noted: 2021-01-12 | Resolved: 2021-06-14

## 2021-06-14 PROCEDURE — 99214 OFFICE O/P EST MOD 30 MIN: CPT | Performed by: FAMILY MEDICINE

## 2021-06-14 RX ORDER — LISDEXAMFETAMINE DIMESYLATE 30 MG/1
30 CAPSULE ORAL EVERY MORNING
Qty: 28 CAPSULE | Refills: 0 | Status: SHIPPED | OUTPATIENT
Start: 2021-06-14 | End: 2021-06-15 | Stop reason: CLARIF

## 2021-06-14 ASSESSMENT — FIBROSIS 4 INDEX: FIB4 SCORE: 0.36
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
## 2021-06-15 DIAGNOSIS — F98.8 ATTENTION DEFICIT DISORDER (ADD) WITHOUT HYPERACTIVITY: ICD-10-CM

## 2021-06-15 RX ORDER — METHYLPHENIDATE HYDROCHLORIDE 36 MG/1
36 TABLET ORAL EVERY MORNING
Qty: 28 TABLET | Refills: 0 | Status: SHIPPED | OUTPATIENT
Start: 2021-06-15 | End: 2021-07-12

## 2021-06-17 DIAGNOSIS — F98.8 ATTENTION DEFICIT DISORDER (ADD) WITHOUT HYPERACTIVITY: ICD-10-CM

## 2021-06-17 RX ORDER — LISDEXAMFETAMINE DIMESYLATE 30 MG/1
30 CAPSULE ORAL EVERY MORNING
Qty: 28 CAPSULE | Refills: 0 | OUTPATIENT
Start: 2021-06-17 | End: 2021-07-15

## 2021-07-12 ENCOUNTER — OFFICE VISIT (OUTPATIENT)
Dept: MEDICAL GROUP | Facility: LAB | Age: 26
End: 2021-07-12
Payer: COMMERCIAL

## 2021-07-12 VITALS
RESPIRATION RATE: 12 BRPM | HEIGHT: 59 IN | TEMPERATURE: 98.2 F | HEART RATE: 89 BPM | SYSTOLIC BLOOD PRESSURE: 126 MMHG | DIASTOLIC BLOOD PRESSURE: 84 MMHG | OXYGEN SATURATION: 97 % | BODY MASS INDEX: 31.85 KG/M2 | WEIGHT: 158 LBS

## 2021-07-12 DIAGNOSIS — F98.8 ATTENTION DEFICIT DISORDER (ADD) WITHOUT HYPERACTIVITY: ICD-10-CM

## 2021-07-12 PROCEDURE — 99213 OFFICE O/P EST LOW 20 MIN: CPT | Performed by: FAMILY MEDICINE

## 2021-07-12 RX ORDER — METHYLPHENIDATE HYDROCHLORIDE 54 MG/1
54 TABLET ORAL EVERY MORNING
Qty: 30 TABLET | Refills: 0 | Status: SHIPPED | OUTPATIENT
Start: 2021-07-12 | End: 2021-08-09

## 2021-07-12 ASSESSMENT — FIBROSIS 4 INDEX: FIB4 SCORE: .3762326679172919259

## 2021-07-12 NOTE — PROGRESS NOTES
"Subjective:     Chief Complaint   Patient presents with   • Follow-Up     1 month         HPI:   Isa presents today to follow up ADHD. We started Concerta last month .   Little bit of a headache when it wears off. She notices around lunch time she feels like it wears off. When she gets home she is very tired. Does note focus and attention is better in the AM at least.  She denies any insomnia or GI upset.  She does note that her mood has improved as well, and on the weekend she does not feel like staying in bed constantly.      Current Outpatient Medications Ordered in Epic   Medication Sig Dispense Refill   • methylphenidate (CONCERTA) 36 MG CR tablet Take 1 tablet by mouth every morning for 28 days. 28 tablet 0   • albuterol 108 (90 Base) MCG/ACT Aero Soln inhalation aerosol Inhale 2 Puffs by mouth every 6 hours as needed for Shortness of Breath. 8.5 g 3     No current [facility]-ordered facility-administered medications on file.           ROS:  Gen: no fevers/chills, no changes in weight  Eyes: no changes in vision  ENT: no sore throat, no hearing loss, no bloody nose  Pulm: no sob, no cough  CV: no chest pain, no palpitations  GI: no nausea/vomiting, no diarrhea  : no dysuria  MSk: no myalgias  Skin: no rash        Objective:     Exam:  /84 (BP Location: Left arm, Patient Position: Sitting, BP Cuff Size: Adult)   Pulse 89   Temp 36.8 °C (98.2 °F)   Resp 12   Ht 1.499 m (4' 11\")   Wt 71.7 kg (158 lb)   SpO2 97%   BMI 31.91 kg/m²  Body mass index is 31.91 kg/m².    Gen: AAOx3, NAD, well appearing  HEENT: NCAT, EOMI, Nares patent, Mucosa moist  Resp: Normal chest wall rise and fall, not SOB, no tachypnea  Skin: no rash or abnormality of visible skin.   Psych: normal speech, not slurred, good insight, affect full  MSK: Moves all four limbs equally and normally, gait normal          Assessment & Plan:     26 y.o. female with the following -     1. Attention deficit disorder (ADD) without " hyperactivity  Discussed increasing the dose of her Concerta for now.  This allows us to keep her on a once daily regimen which is typically easier to remember.  We did discuss however the that if she continues to have a well in the afternoon we may want to switch this to an immediate release in dose or twice a day.  Also discussed that if her concentration and focus remains very good but her mood dips or does not improve is much that we may treat her with an antidepressant as well.  We will follow her up in a month either in person or virtually to discuss how she is doing.  - methylphenidate (CONCERTA) 54 MG CR tablet; Take 1 tablet by mouth every morning for 28 days.  Dispense: 30 tablet; Refill: 0      No follow-ups on file.    Please note that this dictation was created using voice recognition software. I have made every reasonable attempt to correct obvious errors, but I expect that there are errors of grammar and possibly content that I did not discover before finalizing the note.

## 2021-07-30 ENCOUNTER — OFFICE VISIT (OUTPATIENT)
Dept: MEDICAL GROUP | Facility: LAB | Age: 26
End: 2021-07-30
Payer: COMMERCIAL

## 2021-07-30 ENCOUNTER — HOSPITAL ENCOUNTER (OUTPATIENT)
Facility: MEDICAL CENTER | Age: 26
End: 2021-07-30
Attending: FAMILY MEDICINE
Payer: COMMERCIAL

## 2021-07-30 VITALS
HEIGHT: 59 IN | RESPIRATION RATE: 12 BRPM | OXYGEN SATURATION: 99 % | WEIGHT: 151 LBS | DIASTOLIC BLOOD PRESSURE: 80 MMHG | SYSTOLIC BLOOD PRESSURE: 120 MMHG | BODY MASS INDEX: 30.44 KG/M2 | HEART RATE: 95 BPM | TEMPERATURE: 98.2 F

## 2021-07-30 DIAGNOSIS — R10.2 PELVIC PAIN: ICD-10-CM

## 2021-07-30 LAB
APPEARANCE UR: CLEAR
BILIRUB UR STRIP-MCNC: NORMAL MG/DL
COLOR UR AUTO: YELLOW
GLUCOSE UR STRIP.AUTO-MCNC: NORMAL MG/DL
KETONES UR STRIP.AUTO-MCNC: NORMAL MG/DL
LEUKOCYTE ESTERASE UR QL STRIP.AUTO: NORMAL
NITRITE UR QL STRIP.AUTO: NORMAL
PH UR STRIP.AUTO: 8 [PH] (ref 5–8)
PROT UR QL STRIP: NORMAL MG/DL
RBC UR QL AUTO: NORMAL
SP GR UR STRIP.AUTO: 1.02
UROBILINOGEN UR STRIP-MCNC: 0.2 MG/DL

## 2021-07-30 PROCEDURE — 87510 GARDNER VAG DNA DIR PROBE: CPT

## 2021-07-30 PROCEDURE — 87480 CANDIDA DNA DIR PROBE: CPT

## 2021-07-30 PROCEDURE — 87660 TRICHOMONAS VAGIN DIR PROBE: CPT

## 2021-07-30 PROCEDURE — 99214 OFFICE O/P EST MOD 30 MIN: CPT | Performed by: FAMILY MEDICINE

## 2021-07-30 PROCEDURE — 81002 URINALYSIS NONAUTO W/O SCOPE: CPT | Performed by: FAMILY MEDICINE

## 2021-07-30 RX ORDER — METRONIDAZOLE 500 MG/1
500 TABLET ORAL 2 TIMES DAILY
Qty: 14 TABLET | Refills: 0 | Status: SHIPPED | OUTPATIENT
Start: 2021-07-30 | End: 2021-08-06

## 2021-07-30 ASSESSMENT — FIBROSIS 4 INDEX: FIB4 SCORE: .3762326679172919259

## 2021-07-30 NOTE — PROGRESS NOTES
"Subjective:     Chief Complaint   Patient presents with   • Pelvic Pain     Back pain lower to mid x 1 month          HPI:   Isa presents today with pelvic pain and back pain. This has been present for a month or so. Started with pelvic pain, and back pain is now more recent. Does have some vaginal discharge, yellowish green. Smells as well, somewhat fishy, but not completely. No new partners. No recent intercourse.   Sometimes urgency, but not much come out, but then sometimes increase frequency as well. Does have a Mirena, and has had some bloody spotting lately as well. No h/o STDs.     No vaginal washes or cleansers.  She is seeing a chiropractor today as well to see if this would help her back pain.      Current Outpatient Medications Ordered in Epic   Medication Sig Dispense Refill   • Levonorgestrel (MIRENA, 52 MG, IU) by Intrauterine route.     • metroNIDAZOLE (FLAGYL) 500 MG Tab Take 1 tablet by mouth 2 times a day for 7 days. 14 tablet 0   • methylphenidate (CONCERTA) 54 MG CR tablet Take 1 tablet by mouth every morning for 28 days. 30 tablet 0   • albuterol 108 (90 Base) MCG/ACT Aero Soln inhalation aerosol Inhale 2 Puffs by mouth every 6 hours as needed for Shortness of Breath. 8.5 g 3     No current [facility]-ordered facility-administered medications on file.           ROS:  Gen: no fevers/chills, no changes in weight  Eyes: no changes in vision  ENT: no sore throat, no hearing loss, no bloody nose  Pulm: no sob, no cough  CV: no chest pain, no palpitations  GI: no nausea/vomiting, no diarrhea      Skin: no rash  Neuro: no headaches, no numbness/tingling  Heme/Lymph: no easy bruising      Objective:     Exam:  /80 (BP Location: Left arm, Patient Position: Sitting, BP Cuff Size: Adult)   Pulse 95   Temp 36.8 °C (98.2 °F)   Resp 12   Ht 1.499 m (4' 11\")   Wt 68.5 kg (151 lb)   SpO2 99%   BMI 30.50 kg/m²  Body mass index is 30.5 kg/m².    Gen: Alert and oriented, No apparent distress.  Neck: Neck " is supple without lymphadenopathy.  Lungs: Normal effort, CTA bilaterally, no wheezes, rhonchi, or rales  CV: Regular rate and rhythm. No murmurs, rubs, or gallops.  Ext: No clubbing, cyanosis, edema.  :      External genitalia is normal.  There is some thick yellowish discharge present vaginally.  Cervix otherwise normal-appearing.  Mild erosion at os.  Swab was taken.  No cervical motion tenderness.  No internal erythema or masses noted.          Assessment & Plan:     26 y.o. female with the following -   1. Pelvic pain  Discussed possibility of bacterial vaginosis and/or UTI concurrently.  She also may does have back pain related to muscular strain, posture, etc.  Keep appointment with chiropractic today.  We will go ahead and treat empirically for bacterial vaginosis and change treatment as needed.  We will phone with results of the urinalysis if she is able to leave us a specimen.  - VAGINAL PATHOGENS DNA PANEL; Future  - POCT Urinalysis  - metroNIDAZOLE (FLAGYL) 500 MG Tab; Take 1 tablet by mouth 2 times a day for 7 days.  Dispense: 14 tablet; Refill: 0  .      No follow-ups on file.    Please note that this dictation was created using voice recognition software. I have made every reasonable attempt to correct obvious errors, but I expect that there are errors of grammar and possibly content that I did not discover before finalizing the note.

## 2021-08-16 ENCOUNTER — HOSPITAL ENCOUNTER (OUTPATIENT)
Dept: RADIOLOGY | Facility: MEDICAL CENTER | Age: 26
End: 2021-08-16
Attending: FAMILY MEDICINE
Payer: COMMERCIAL

## 2021-08-16 ENCOUNTER — OFFICE VISIT (OUTPATIENT)
Dept: MEDICAL GROUP | Facility: LAB | Age: 26
End: 2021-08-16
Payer: COMMERCIAL

## 2021-08-16 VITALS
WEIGHT: 150 LBS | HEIGHT: 59 IN | OXYGEN SATURATION: 98 % | BODY MASS INDEX: 30.24 KG/M2 | RESPIRATION RATE: 12 BRPM | TEMPERATURE: 98.4 F | HEART RATE: 94 BPM | DIASTOLIC BLOOD PRESSURE: 78 MMHG | SYSTOLIC BLOOD PRESSURE: 132 MMHG

## 2021-08-16 DIAGNOSIS — N76.1 SUBACUTE VAGINITIS: ICD-10-CM

## 2021-08-16 DIAGNOSIS — H00.014 HORDEOLUM EXTERNUM OF LEFT UPPER EYELID: ICD-10-CM

## 2021-08-16 DIAGNOSIS — R10.2 PELVIC PAIN: ICD-10-CM

## 2021-08-16 DIAGNOSIS — F98.8 ATTENTION DEFICIT DISORDER (ADD) WITHOUT HYPERACTIVITY: ICD-10-CM

## 2021-08-16 PROCEDURE — 99214 OFFICE O/P EST MOD 30 MIN: CPT | Performed by: FAMILY MEDICINE

## 2021-08-16 PROCEDURE — 76830 TRANSVAGINAL US NON-OB: CPT

## 2021-08-16 RX ORDER — METRONIDAZOLE 500 MG/1
500 TABLET ORAL 2 TIMES DAILY
Qty: 14 TABLET | Refills: 0 | Status: SHIPPED | OUTPATIENT
Start: 2021-08-16 | End: 2021-08-23

## 2021-08-16 RX ORDER — METHYLPHENIDATE HYDROCHLORIDE 20 MG/1
20 TABLET ORAL 2 TIMES DAILY
Qty: 56 TABLET | Refills: 0 | Status: SHIPPED | OUTPATIENT
Start: 2021-08-16 | End: 2021-08-23

## 2021-08-16 ASSESSMENT — FIBROSIS 4 INDEX: FIB4 SCORE: .3762326679172919259

## 2021-08-16 NOTE — PROGRESS NOTES
"Subjective:     Chief Complaint   Patient presents with   • Follow-Up     pelvic issue     left eye swollen         HPI:   Isa presents today with continued vaginal discharge, odor, consistent with previously. Treated for BV couple weeks ago. Still also with some pelvic pressure, cramping. No chance for pregnancy. Still some itching as well.   Some possible diarrhea issues, and stomach upset with concerta.   She also reports that she has been sleeping well of late she thinks since we upped the dose of her Concerta.  She has been having to take melatonin nightly, and she also wonders about some potential changes in her mood and mental health.  She has been treated in the past for depression and she worries this might be coming back.  LMP: January. Has Mirena. Placed last year, July.         Current Outpatient Medications Ordered in Epic   Medication Sig Dispense Refill   • Levonorgestrel (MIRENA, 52 MG, IU) by Intrauterine route.     • albuterol 108 (90 Base) MCG/ACT Aero Soln inhalation aerosol Inhale 2 Puffs by mouth every 6 hours as needed for Shortness of Breath. 8.5 g 3     No current [facility]-ordered facility-administered medications on file.           ROS:  Gen: no fevers/chills, no changes in weight  Eyes: no changes in vision  ENT: no sore throat, no hearing loss, no bloody nose  Pulm: no sob, no cough  CV: no chest pain, no palpitations        Objective:     Exam:  /78 (BP Location: Left arm, Patient Position: Sitting, BP Cuff Size: Adult)   Pulse 94   Temp 36.9 °C (98.4 °F)   Resp 12   Ht 1.499 m (4' 11\")   Wt 68 kg (150 lb)   SpO2 98%   BMI 30.30 kg/m²  Body mass index is 30.3 kg/m².    Gen: Alert and oriented, No apparent distress.  HEENT: NCAT, EOMI, PERRL, left eye with upper palpebral edema, no exudate or purulent drainage is noted.  No discrete mass rather the whole upper eyelid is swollen.  Conjunctive is normal, noninjected, nonicteric.  Neck: Neck is supple without " lymphadenopathy.  Lungs: Normal effort, CTA bilaterally, no wheezes, rhonchi, or rales  CV: Regular rate and rhythm. No murmurs, rubs, or gallops.  Ext: No clubbing, cyanosis, edema.        Labs: Reviewed previous ultrasound of the pelvis in July 2020 which did show an ovarian cyst and also a small posterior fibroid.  Reviewed recent vaginal swab which was positive for bacterial vaginosis.    Assessment & Plan:     26 y.o. female with the following -   1. Pelvic pain  We will try retreating for BV again with the Flagyl she did get improvement with this previously.  We will also get an ultrasound to check Mirena placement and also resurvey the size of the fibroid and any ovarian cysts.  Discussed possibly needing to remove the IUD and replace or use a different birth control.  - metroNIDAZOLE (FLAGYL) 500 MG Tab; Take 1 Tablet by mouth 2 times a day for 7 days.  Dispense: 14 Tablet; Refill: 0  - US-PELVIC COMPLETE (TRANSABDOMINAL/TRANSVAGINAL) (COMBO); Future    2. Subacute vaginitis  See above.  - metroNIDAZOLE (FLAGYL) 500 MG Tab; Take 1 Tablet by mouth 2 times a day for 7 days.  Dispense: 14 Tablet; Refill: 0    3. Attention deficit disorder (ADD) without hyperactivity  Discussed changing from a long-acting to a short acting methylphenidate so that she has control of her her second dose timing in an effort to improve sleep.  We may also go back to the smaller dose of Concerta but potentially increase or add an SSRI or something like Wellbutrin in the future.  She is agreeable to this plan.  - methylphenidate (RITALIN) 20 MG tablet; Take 1 Tablet by mouth 2 times a day for 28 days.  Dispense: 56 Tablet; Refill: 0    4. Hordeolum:   Discussed warm wet compresses for possible undeclared hordeolum at the upper left eyelid.  Okay to use regular natural tears for eye lubrication and also an over-the-counter Zyrtec or Claritin for any allergies to avoid rubbing the eye.  She will let us know if this continues or  resolves.    No follow-ups on file.    Please note that this dictation was created using voice recognition software. I have made every reasonable attempt to correct obvious errors, but I expect that there are errors of grammar and possibly content that I did not discover before finalizing the note.

## 2021-08-19 ENCOUNTER — TELEPHONE (OUTPATIENT)
Dept: MEDICAL GROUP | Facility: LAB | Age: 26
End: 2021-08-19

## 2021-08-19 NOTE — TELEPHONE ENCOUNTER
MEDICATION PRIOR AUTHORIZATION NEEDED:    1. Name of Medication: methylphenidate (RITALIN)    2. Requested By (Name of Pharmacy): [facility]     3. Is insurance on file current? yes    4. What is the name & phone number of the 3rd party payor? [email or web address]

## 2021-08-23 ENCOUNTER — PATIENT MESSAGE (OUTPATIENT)
Dept: MEDICAL GROUP | Facility: LAB | Age: 26
End: 2021-08-23

## 2021-08-23 DIAGNOSIS — F98.8 ATTENTION DEFICIT DISORDER (ADD) WITHOUT HYPERACTIVITY: ICD-10-CM

## 2021-08-23 RX ORDER — METHYLPHENIDATE HYDROCHLORIDE 54 MG/1
54 TABLET ORAL EVERY MORNING
Qty: 28 TABLET | Refills: 0 | Status: SHIPPED | OUTPATIENT
Start: 2021-08-23 | End: 2021-10-01 | Stop reason: SDUPTHER

## 2021-08-31 ENCOUNTER — TELEPHONE (OUTPATIENT)
Dept: MEDICAL GROUP | Facility: LAB | Age: 26
End: 2021-08-31

## 2021-08-31 NOTE — TELEPHONE ENCOUNTER
MEDICATION PRIOR AUTHORIZATION NEEDED:    1. Name of Medication: methylphenidate (CONCERTA)      2. Requested By (Name of Pharmacy): [facility]     3. Is insurance on file current? yes    4. What is the name & phone number of the 3rd party payor?   [id number]       
no

## 2021-09-02 ENCOUNTER — TELEPHONE (OUTPATIENT)
Dept: OCCUPATIONAL MEDICINE | Facility: CLINIC | Age: 26
End: 2021-09-02

## 2021-09-02 NOTE — DISCHARGE INSTRUCTIONS
Caitlin Orellana is a 38 year old female presenting for   Chief Complaint   Patient presents with   • Gyn Exam     Denies known Latex allergy or symptoms of Latex sensitivity.  Medications reviewed and updated.  Birth control:ParaGard intrauterine device  Patient's last menstrual period was 08/16/2021.  Refills needed today: No  Last pap smear: 8/30/17 normal / hpv -  Any history of abnormal pap smears: Yes, years ago, repeat pap normal   Any history of sexually transmitted diseases:  Yes, chlamydia  Last Mammogram: 2011 Left breast US due to breast pain. US normal   Last Colonoscopy: n/a    Tetanus Vaccine reviewed and is current  Reviewed overdue health maintenance topics with patient.  Health Maintenance Due   Topic Date Due   • Influenza Vaccine (1) 09/01/2021            Stop taking Bactrim, in the future when asked about allergies states that your allergies sulfa medication causes swelling and itching.  You are being discharged home with steroids, a different antibiotic stop taking Bactrim.  And is recommended that she take Benadryl, 2 tablets every 6 hours for the next 24 hours to help control the itching.  Return if your symptoms worsen.

## 2021-09-02 NOTE — TELEPHONE ENCOUNTER
Patient called because she called out due to a migraine. Patient denies any other symptoms and say it is normal for her to have migraines. She was told that at this time she does not need to be tested due to only having the migraine and no other symptoms. Advised that if she needs to monitor her symptoms and if she develops anything new to call us.

## 2021-09-20 ENCOUNTER — OFFICE VISIT (OUTPATIENT)
Dept: URGENT CARE | Facility: CLINIC | Age: 26
End: 2021-09-20
Payer: COMMERCIAL

## 2021-09-20 ENCOUNTER — HOSPITAL ENCOUNTER (OUTPATIENT)
Facility: MEDICAL CENTER | Age: 26
End: 2021-09-20
Attending: PHYSICIAN ASSISTANT
Payer: COMMERCIAL

## 2021-09-20 VITALS
BODY MASS INDEX: 29.25 KG/M2 | DIASTOLIC BLOOD PRESSURE: 74 MMHG | WEIGHT: 149 LBS | TEMPERATURE: 97.2 F | HEART RATE: 96 BPM | SYSTOLIC BLOOD PRESSURE: 112 MMHG | OXYGEN SATURATION: 98 % | HEIGHT: 60 IN | RESPIRATION RATE: 18 BRPM

## 2021-09-20 DIAGNOSIS — B97.89 VIRAL RESPIRATORY ILLNESS: ICD-10-CM

## 2021-09-20 DIAGNOSIS — J98.8 VIRAL RESPIRATORY ILLNESS: ICD-10-CM

## 2021-09-20 DIAGNOSIS — R05.9 COUGH: ICD-10-CM

## 2021-09-20 DIAGNOSIS — J02.9 SORE THROAT: ICD-10-CM

## 2021-09-20 LAB
INT CON NEG: NEGATIVE
INT CON POS: POSITIVE
S PYO AG THROAT QL: NEGATIVE

## 2021-09-20 PROCEDURE — 0240U HCHG SARS-COV-2 COVID-19 NFCT DS RESP RNA 3 TRGT MIC: CPT

## 2021-09-20 PROCEDURE — 99213 OFFICE O/P EST LOW 20 MIN: CPT | Performed by: PHYSICIAN ASSISTANT

## 2021-09-20 PROCEDURE — 87880 STREP A ASSAY W/OPTIC: CPT | Performed by: PHYSICIAN ASSISTANT

## 2021-09-20 RX ORDER — IBUPROFEN 200 MG
600 TABLET ORAL EVERY 6 HOURS PRN
COMMUNITY
End: 2022-12-12

## 2021-09-20 ASSESSMENT — ENCOUNTER SYMPTOMS
RHINORRHEA: 1
SHORTNESS OF BREATH: 0
DIARRHEA: 0
COUGH: 1
EYE REDNESS: 0
SORE THROAT: 1
VOMITING: 1
FEVER: 1
EYE DISCHARGE: 0
HEADACHES: 1
NAUSEA: 1
MYALGIAS: 1

## 2021-09-20 ASSESSMENT — FIBROSIS 4 INDEX: FIB4 SCORE: .3762326679172919259

## 2021-09-20 ASSESSMENT — PAIN SCALES - GENERAL: PAINLEVEL: 5=MODERATE PAIN

## 2021-09-20 NOTE — PROGRESS NOTES
Subjective     Isa Mcclure is a 26 y.o. female who presents with Body Aches (x 1 day, sore throat, painful to swallow, dry/phlemmy cough, congestion, had fever, headache)            URI   This is a new problem. Episode onset: x 1 day ago. The problem has been unchanged. The maximum temperature recorded prior to her arrival was 100.4 - 100.9 F. Associated symptoms include congestion, coughing, headaches, nausea, a plugged ear sensation, rhinorrhea, a sore throat and vomiting (The patient reports 1 episode of vomiting, now resolved.). Pertinent negatives include no chest pain, diarrhea, ear pain, joint pain or rash. She has tried NSAIDs (and DayQuil) for the symptoms.     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  The patient is fully vaccinated for COVID-19.    PMH:  has a past medical history of Allergy, Anxiety, Asthma, Depression, Drug abuse, IV (HCC), Migraine, Substance abuse (HCC), and Urinary tract infection. She also has no past medical history of Addisons disease ([facility]), Adrenal disorder (HCC), Anemia, Arrhythmia, Arthritis, Blood transfusion without reported diagnosis, Cancer ([facility]), Cataract, CHF (congestive heart failure) ([facility]), Clotting disorder (HCC), COPD (chronic obstructive pulmonary disease) (HCC), Cushings syndrome ([facility]), Diabetes (HCC), Diabetic neuropathy (HCC), GERD (gastroesophageal reflux disease), Glaucoma, Goiter, Head ache, Heart attack (HCC), Heart murmur, HIV (human immunodeficiency virus infection) ([facility]), Hyperlipidemia, Hypertension, IBD (inflammatory bowel disease), Kidney disease, Meningitis, Muscle disorder, Osteoporosis, Parathyroid disorder (HCC), Pituitary disease (HCC), Pulmonary emphysema (HCC), Seizure (HCC), Sickle cell disease (HCC), Stroke (HCC), Thyroid disease, or Tuberculosis.  MEDS:   Current Outpatient Medications:   •  Pseudoephedrine-APAP-DM (DAYQUIL MULTI-SYMPTOM COLD/FLU PO), Take  by mouth., Disp: , Rfl:   •   ibuprofen (MOTRIN) 200 MG Tab, Take 600 mg by mouth every 6 hours as needed., Disp: , Rfl:   •  Levonorgestrel (MIRENA, 52 MG, IU), by Intrauterine route., Disp: , Rfl:   •  albuterol 108 (90 Base) MCG/ACT Aero Soln inhalation aerosol, Inhale 2 Puffs by mouth every 6 hours as needed for Shortness of Breath., Disp: 8.5 g, Rfl: 3  •  methylphenidate (CONCERTA) 54 MG CR tablet, Take 1 Tablet by mouth every morning for 28 days. (Patient not taking: Reported on 9/20/2021), Disp: 28 Tablet, Rfl: 0  ALLERGIES:   Allergies   Allergen Reactions   • Bactrim Ds Hives     hives   • Hydrocodone Itching and Nausea     SURGHX:   Past Surgical History:   Procedure Laterality Date   • CHOLECYSTECTOMY       SOCHX:  reports that she has quit smoking. Her smoking use included cigarettes. She has never used smokeless tobacco. She reports previous alcohol use. She reports previous drug use. Drug: Marijuana.  FH: Family history was reviewed, no pertinent findings to report      Review of Systems   Constitutional: Positive for fever (The patient reports an associated fever with a max temp of 100.9.).   HENT: Positive for congestion, rhinorrhea and sore throat. Negative for ear pain.    Eyes: Negative for discharge and redness.   Respiratory: Positive for cough. Negative for shortness of breath.    Cardiovascular: Negative for chest pain and leg swelling.   Gastrointestinal: Positive for nausea and vomiting (The patient reports 1 episode of vomiting, now resolved.). Negative for diarrhea.   Musculoskeletal: Positive for myalgias. Negative for joint pain.   Skin: Negative for rash.   Neurological: Positive for headaches.              Objective     /74 (BP Location: Left arm, Patient Position: Sitting, BP Cuff Size: Adult)   Pulse 96   Temp 36.2 °C (97.2 °F) (Temporal)   Resp 18   Ht 1.524 m (5')   Wt 67.6 kg (149 lb)   SpO2 98%   BMI 29.10 kg/m²      Physical Exam  Constitutional:       General: She is not in acute distress.      Appearance: Normal appearance. She is not ill-appearing.   HENT:      Head: Normocephalic and atraumatic.      Right Ear: Tympanic membrane, ear canal and external ear normal.      Left Ear: Tympanic membrane, ear canal and external ear normal.      Nose: Nose normal.      Mouth/Throat:      Mouth: Mucous membranes are moist.      Pharynx: Oropharynx is clear. Uvula midline. Posterior oropharyngeal erythema present.      Tonsils: No tonsillar exudate.   Eyes:      Extraocular Movements: Extraocular movements intact.      Conjunctiva/sclera: Conjunctivae normal.   Cardiovascular:      Rate and Rhythm: Normal rate and regular rhythm.      Heart sounds: Normal heart sounds.   Pulmonary:      Effort: Pulmonary effort is normal. No respiratory distress.      Breath sounds: Normal breath sounds. No wheezing.   Musculoskeletal:         General: Normal range of motion.      Cervical back: Normal range of motion and neck supple.   Skin:     General: Skin is warm and dry.   Neurological:      Mental Status: She is alert and oriented to person, place, and time.               Progress:  POCT Rapid Strep: NEGATIVE    COVID-19 PCR - pending      Influenza A/B - pending              Assessment & Plan          1. Viral respiratory illness  - CoV-2 and Flu A/B by PCR (24 hour In-House): Collect NP swab in VTM; Future    2. Cough    3. Sore throat  - POCT Rapid Strep A    The patient's presenting symptoms and physical exam findings are consistent with a viral respiratory illness with associated cough and sore throat.  On physical exam, the patient had erythema to the posterior oropharynx without tonsillar hypertrophy or exudates.  The remainder the patient's physical exam today in clinic was normal.  The patient appears in no acute distress.  The patient's vital signs are stable and within normal limits.  She is afebrile today in clinic.  The patient's POCT rapid strep test today in clinic was negative.  Discussed likely viral  etiology with the patient.  Based on the patient's presenting symptoms, will test the patient for COVID-19.  Advised patient to stay at home under self quarantine per CDC guidelines.  Provided the patient with a work note.  Recommend OTC medications and supportive care for symptomatic management.  Recommend patient follow-up with her PCP as needed.  Discussed return precautions with the patient, and she verbalized understanding.    Differential diagnoses, supportive care, and indications for immediate follow-up discussed with patient.   Instructed to return to clinic or nearest emergency department for any change in condition, further concerns, or worsening of symptoms.'    OTC Tylenol or Motrin for fever/discomfort.  OTC cough/cold medication for symptomatic relief  OTC Supportive Care for Congestion - saline nasal spray or neti pot  OTC Supportive Care for Sore Throat - warm salt water gargles, sore throat lozenges, warm lemon water, and/or tea.  Drink plenty of fluids  Advised the patient to stay at home under self-isolation until symptoms have been present for at least 10 days and are improved, and there has been no fever for at least 72 hours without the use of medications and/or no vomiting or diarrhea for 48 hours.  Work note provided  Follow-up with PCP  Return to clinic or go to the ED if symptoms worsen or fail to improve, or if the patient should develop worsening/increasing cough, congestion, ear pain, sore throat, shortness of breath, wheezing, chest pain, fever/chills, and/or any concerning symptoms.    Discussed plan with the patient, and she agrees to the above.     I personally reviewed prior external notes and test results pertinent to today's visit.  I have independently reviewed and interpreted all diagnostics ordered during this urgent care visit.     Time spent evaluating this patient was at least 30 minutes and includes preparing for visit, obtaining history, exam and evaluation, ordering  labs/tests/procedures/medications, independent interpretation, and counseling/education.    Please note that this dictation was created using voice recognition software. I have made every reasonable attempt to correct obvious errors, but I expect that there may be errors of grammar and possibly content that I did not discover before finalizing the note.     This note was electronically signed by Macie Branham PA-C

## 2021-09-20 NOTE — LETTER
September 20, 2021         Patient: Isa Mcclure   YOB: 1995   Date of Visit: 9/20/2021     To Whom it May Concern,     Your employee was seen in our clinic today.  A concern for COVID-19 has been identified and testing is in progress.      We are asking you to excuse absences while following self-isolation protocol per Center for Disease Control (CDC) guidelines.  Your employee will be able to access test results through our electronic delivery system called [employer].      If the results of testing are negative, and once there has been no fever (temperature >100.4 F) for at least 72 hours without treatment, and no vomiting or diarrhea for at least 48 hours, then return to work is approved.       If the results of testing are positive then your employee will be contacted by the [facility] or [facility] department for further instructions on duration of self-isolation and return to work protocol. In general, this will also follow the CDC guidelines with a minimum of 10 days from the onset of symptoms and without fever, vomiting, or diarrhea as above.      In general, repeat testing is not necessary and not offered through our [facility].      This is the only note that will be provided from [facility] for this visit.  Your employee will require an appointment with a primary care provider if FMLA or disability forms are required.       Sincerely,    Macie Branham P.A.-C.  Electronically Signed

## 2021-09-21 LAB
FLUAV RNA SPEC QL NAA+PROBE: NEGATIVE
FLUBV RNA SPEC QL NAA+PROBE: NEGATIVE
SARS-COV-2 RNA RESP QL NAA+PROBE: NOTDETECTED
SPECIMEN SOURCE: NORMAL

## 2021-09-22 ENCOUNTER — OFFICE VISIT (OUTPATIENT)
Dept: URGENT CARE | Facility: CLINIC | Age: 26
End: 2021-09-22
Payer: COMMERCIAL

## 2021-09-22 VITALS
BODY MASS INDEX: 30.24 KG/M2 | RESPIRATION RATE: 14 BRPM | DIASTOLIC BLOOD PRESSURE: 82 MMHG | TEMPERATURE: 97.4 F | WEIGHT: 150 LBS | HEIGHT: 59 IN | HEART RATE: 94 BPM | SYSTOLIC BLOOD PRESSURE: 116 MMHG | OXYGEN SATURATION: 97 %

## 2021-09-22 DIAGNOSIS — J45.901 ASTHMA WITH ACUTE EXACERBATION, UNSPECIFIED ASTHMA SEVERITY, UNSPECIFIED WHETHER PERSISTENT: ICD-10-CM

## 2021-09-22 DIAGNOSIS — J98.8 RTI (RESPIRATORY TRACT INFECTION): ICD-10-CM

## 2021-09-22 DIAGNOSIS — R06.2 WHEEZE: ICD-10-CM

## 2021-09-22 DIAGNOSIS — R06.02 SOB (SHORTNESS OF BREATH): ICD-10-CM

## 2021-09-22 DIAGNOSIS — J40 BRONCHITIS: ICD-10-CM

## 2021-09-22 PROCEDURE — 99214 OFFICE O/P EST MOD 30 MIN: CPT | Performed by: NURSE PRACTITIONER

## 2021-09-22 RX ORDER — AZITHROMYCIN 250 MG/1
TABLET, FILM COATED ORAL
Qty: 6 TABLET | Refills: 0 | Status: SHIPPED | OUTPATIENT
Start: 2021-09-22 | End: 2021-09-27

## 2021-09-22 RX ORDER — ALBUTEROL SULFATE 2.5 MG/3ML
2.5 SOLUTION RESPIRATORY (INHALATION) EVERY 4 HOURS PRN
Qty: 90 ML | Refills: 0 | Status: SHIPPED | OUTPATIENT
Start: 2021-09-22 | End: 2022-04-05

## 2021-09-22 RX ORDER — PREDNISONE 20 MG/1
TABLET ORAL
Qty: 11 TABLET | Refills: 0 | Status: SHIPPED | OUTPATIENT
Start: 2021-09-22 | End: 2021-10-11

## 2021-09-22 RX ORDER — IPRATROPIUM BROMIDE AND ALBUTEROL SULFATE 2.5; .5 MG/3ML; MG/3ML
3 SOLUTION RESPIRATORY (INHALATION) ONCE
OUTPATIENT
Start: 2021-09-22 | End: 2021-09-23

## 2021-09-22 ASSESSMENT — ENCOUNTER SYMPTOMS
SHORTNESS OF BREATH: 1
COUGH: 1
WHEEZING: 1

## 2021-09-22 ASSESSMENT — FIBROSIS 4 INDEX: FIB4 SCORE: .3762326679172919259

## 2021-09-22 ASSESSMENT — VISUAL ACUITY: OU: 1

## 2021-09-22 NOTE — PATIENT INSTRUCTIONS

## 2021-09-22 NOTE — LETTER
September 22, 2021         Patient: Isa Mcclure   YOB: 1995   Date of Visit: 9/22/2021           To Whom it May Concern:    Isa Mcclure was seen in my clinic on 9/22/2021 due to illness/injury. Due to medical necessity, please excuse patient from work for up to the next 5 days as needed.     If you have any questions or concerns, please don't hesitate to call.        Sincerely,         RUPINDER Kent.  Electronically Signed

## 2021-10-01 DIAGNOSIS — F98.8 ATTENTION DEFICIT DISORDER (ADD) WITHOUT HYPERACTIVITY: ICD-10-CM

## 2021-10-01 RX ORDER — METHYLPHENIDATE HYDROCHLORIDE 54 MG/1
54 TABLET ORAL EVERY MORNING
Qty: 28 TABLET | Refills: 0 | Status: SHIPPED | OUTPATIENT
Start: 2021-10-01 | End: 2021-10-29

## 2021-10-11 ENCOUNTER — OFFICE VISIT (OUTPATIENT)
Dept: MEDICAL GROUP | Facility: LAB | Age: 26
End: 2021-10-11
Payer: COMMERCIAL

## 2021-10-11 VITALS
HEIGHT: 59 IN | TEMPERATURE: 98 F | SYSTOLIC BLOOD PRESSURE: 132 MMHG | BODY MASS INDEX: 30.44 KG/M2 | OXYGEN SATURATION: 93 % | WEIGHT: 151 LBS | DIASTOLIC BLOOD PRESSURE: 90 MMHG | RESPIRATION RATE: 12 BRPM | HEART RATE: 85 BPM

## 2021-10-11 DIAGNOSIS — F51.3 SLEEP WALKING AND EATING: ICD-10-CM

## 2021-10-11 DIAGNOSIS — J45.30 MILD PERSISTENT ASTHMA WITHOUT COMPLICATION: ICD-10-CM

## 2021-10-11 DIAGNOSIS — Z23 NEED FOR VACCINATION: ICD-10-CM

## 2021-10-11 PROCEDURE — 90471 IMMUNIZATION ADMIN: CPT | Performed by: FAMILY MEDICINE

## 2021-10-11 PROCEDURE — 90686 IIV4 VACC NO PRSV 0.5 ML IM: CPT | Performed by: FAMILY MEDICINE

## 2021-10-11 PROCEDURE — 99214 OFFICE O/P EST MOD 30 MIN: CPT | Mod: 25 | Performed by: FAMILY MEDICINE

## 2021-10-11 RX ORDER — TRAZODONE HYDROCHLORIDE 50 MG/1
25 TABLET ORAL NIGHTLY PRN
Qty: 30 TABLET | Refills: 3 | Status: SHIPPED | OUTPATIENT
Start: 2021-10-11 | End: 2021-10-15

## 2021-10-11 ASSESSMENT — ANXIETY QUESTIONNAIRES
GAD7 TOTAL SCORE: 8
3. WORRYING TOO MUCH ABOUT DIFFERENT THINGS: SEVERAL DAYS
2. NOT BEING ABLE TO STOP OR CONTROL WORRYING: SEVERAL DAYS
3. WORRYING TOO MUCH ABOUT DIFFERENT THINGS: SEVERAL DAYS
6. BECOMING EASILY ANNOYED OR IRRITABLE: MORE THAN HALF THE DAYS
7. FEELING AFRAID AS IF SOMETHING AWFUL MIGHT HAPPEN: NOT AT ALL
5. BEING SO RESTLESS THAT IT IS HARD TO SIT STILL: SEVERAL DAYS
GAD7 TOTAL SCORE: 8
6. BECOMING EASILY ANNOYED OR IRRITABLE: MORE THAN HALF THE DAYS
1. FEELING NERVOUS, ANXIOUS, OR ON EDGE: SEVERAL DAYS
1. FEELING NERVOUS, ANXIOUS, OR ON EDGE: SEVERAL DAYS
7. FEELING AFRAID AS IF SOMETHING AWFUL MIGHT HAPPEN: NOT AT ALL
5. BEING SO RESTLESS THAT IT IS HARD TO SIT STILL: SEVERAL DAYS
2. NOT BEING ABLE TO STOP OR CONTROL WORRYING: SEVERAL DAYS
4. TROUBLE RELAXING: MORE THAN HALF THE DAYS
4. TROUBLE RELAXING: MORE THAN HALF THE DAYS

## 2021-10-11 ASSESSMENT — FIBROSIS 4 INDEX: FIB4 SCORE: .3762326679172919259

## 2021-10-11 ASSESSMENT — PATIENT HEALTH QUESTIONNAIRE - PHQ9
5. POOR APPETITE OR OVEREATING: 1 - SEVERAL DAYS
CLINICAL INTERPRETATION OF PHQ2 SCORE: 2
SUM OF ALL RESPONSES TO PHQ QUESTIONS 1-9: 9

## 2021-10-11 NOTE — PROGRESS NOTES
Subjective:     Chief Complaint   Patient presents with   • Follow-Up         HPI:   Isa presents today to [facility] urgent care visit for URI with wheezing and SOB. COVID testing negative,. Given Duonebs, prednisone taper, albuterol, as well as azithromycin.This was all very helpful. She does still have some SOB more often.   No PFTs since teenager. Never used a controlled inhaler in the past.   SOB worse with activity. Worse with changes in temp, going from hot to cold, and vice versa.   Notices some issues with sleeping.     Doing well with concerta. Going well. Work is good. Still struggles with insomnia, not really related to meds.   She has a hard time getting to sleep and staying asleep.   No snoring, but does sleep walk and tosses and turns a lot. Sleep walks 3-4 times a night.   Sleep eats and drinks often.   Never treated with anything in the past other than melatonin.           Current Outpatient Medications Ordered in Epic   Medication Sig Dispense Refill   • methylphenidate (CONCERTA) 54 MG CR tablet Take 1 Tablet by mouth every morning for 28 days. 28 Tablet 0   • predniSONE (DELTASONE) 20 MG Tab Take 3 tabs daily x 2 days, then 2 tabs daily x 2 days, then 1 tab on final day. 11 Tablet 0   • albuterol (PROVENTIL) 2.5mg/3ml Nebu Soln solution for nebulization Take 3 mL by nebulization every four hours as needed for Shortness of Breath (and/or wheezing). 90 mL 0   • Pseudoephedrine-APAP-DM (DAYQUIL MULTI-SYMPTOM COLD/FLU PO) Take  by mouth.     • ibuprofen (MOTRIN) 200 MG Tab Take 600 mg by mouth every 6 hours as needed.     • Levonorgestrel (MIRENA, 52 MG, IU) by Intrauterine route.     • albuterol 108 (90 Base) MCG/ACT Aero Soln inhalation aerosol Inhale 2 Puffs by mouth every 6 hours as needed for Shortness of Breath. 8.5 g 3     No current [facility]-ordered facility-administered medications on file.         ROS:  Gen: no fevers/chills, no changes in weight  Eyes: no changes in vision  ENT: no sore  "throat, no hearing loss, no bloody nose  Pulm: no sob, no cough  CV: no chest pain, no palpitations  GI: no nausea/vomiting, no diarrhea  : no dysuria  MSk: no myalgias  Skin: no rash  Neuro: no headaches, no numbness/tingling  Heme/Lymph: no easy bruising      Objective:     Exam:  /90 (BP Location: Right arm, Patient Position: Sitting, BP Cuff Size: Adult)   Pulse 85   Temp 36.7 °C (98 °F)   Resp 12   Ht 1.499 m (4' 11\")   Wt 68.5 kg (151 lb)   LMP  (LMP Unknown)   SpO2 93%   BMI 30.50 kg/m²  Body mass index is 30.5 kg/m².    Gen: Alert and oriented, No apparent distress.  Neck: Neck is supple without lymphadenopathy.  Lungs: Normal effort, CTA bilaterally, no wheezes, rhonchi, or rales  CV: Regular rate and rhythm. No murmurs, rubs, or gallops.  Ext: No clubbing, cyanosis, edema.      Assessment & Plan:     26 y.o. female with the following -     1. Need for vaccination    - INFLUENZA VACCINE QUAD INJ (PF)    2. Mild persistent asthma without complication  Discussed some formal PFTs to assess severity.  We will also in the interim start on a low-dose Qvar to see if this is helpful for preventative.  - PULMONARY FUNCTION TESTS -Test requested: Spirometry with-out & with Bronchodilator; Future  - beclomethasone (QVAR) 40 MCG/ACT inhaler; Inhale 1 Puff 2 times a day.  Dispense: 1 Each; Refill: 3    3. Sleep walking and eating  Discussed history of disordered sleep.  I like her to continue to avoid melatonin and we will go ahead and try some trazodone.  She is referred to sleep medicine to see if and get a sleep study as well.  She will let me know in the next 3 to 5 days how her sleep is doing and if this medication is helpful.  Very much may have to do with some depression anxiety scores as well which are fairly high today.  May in the future try better antidepressant and anxiety treatment as well.  - REFERRAL TO PULMONARY AND SLEEP MEDICINE  - traZODone (DESYREL) 50 MG Tab; Take 0.5 Tablets by " mouth at bedtime as needed for Sleep.  Dispense: 30 Tablet; Refill: 3              No follow-ups on file.    Please note that this dictation was created using voice recognition software. I have made every reasonable attempt to correct obvious errors, but I expect that there are errors of grammar and possibly content that I did not discover before finalizing the note.

## 2021-10-12 RX ORDER — FLUTICASONE PROPIONATE 44 MCG
1 AEROSOL WITH ADAPTER (GRAM) INHALATION 2 TIMES DAILY
Qty: 1 EACH | Refills: 1 | Status: SHIPPED | OUTPATIENT
Start: 2021-10-12 | End: 2022-04-05

## 2021-10-15 ENCOUNTER — PATIENT MESSAGE (OUTPATIENT)
Dept: MEDICAL GROUP | Facility: LAB | Age: 26
End: 2021-10-15

## 2021-10-15 DIAGNOSIS — F51.3 SLEEP WALKING AND EATING: ICD-10-CM

## 2021-10-15 RX ORDER — ESZOPICLONE 2 MG/1
2 TABLET, FILM COATED ORAL NIGHTLY PRN
Qty: 28 TABLET | Refills: 0 | Status: SHIPPED | OUTPATIENT
Start: 2021-10-15 | End: 2021-11-04

## 2021-10-22 ENCOUNTER — HOSPITAL ENCOUNTER (OUTPATIENT)
Dept: PULMONOLOGY | Facility: MEDICAL CENTER | Age: 26
End: 2021-10-22
Attending: FAMILY MEDICINE
Payer: COMMERCIAL

## 2021-10-22 DIAGNOSIS — J45.30 MILD PERSISTENT ASTHMA WITHOUT COMPLICATION: ICD-10-CM

## 2021-10-22 PROCEDURE — 94060 EVALUATION OF WHEEZING: CPT

## 2021-10-22 PROCEDURE — 94060 EVALUATION OF WHEEZING: CPT | Mod: 26 | Performed by: INTERNAL MEDICINE

## 2021-10-22 RX ORDER — ALBUTEROL SULFATE 90 UG/1
2 AEROSOL, METERED RESPIRATORY (INHALATION)
Status: DISCONTINUED | OUTPATIENT
Start: 2021-10-22 | End: 2021-10-23 | Stop reason: HOSPADM

## 2021-10-22 ASSESSMENT — PULMONARY FUNCTION TESTS
FEV1_LLN: 2.29
FEV1/FVC_PERCENT_PREDICTED: 95
FEV1/FVC_PERCENT_PREDICTED: 94
FEV1/FVC_PERCENT_LLN: 72.33
FEV1_PERCENT_CHANGE: -3
FEV1/FVC_PREDICTED: 86.63
FEV1/FVC_PERCENT_PREDICTED: 93
FVC_LLN: 2.65
FVC_PREDICTED: 3.17
FEV1_LLN: 2.29
FEV1: 2.79
FVC: 3.43
FVC_LLN: 2.65
FEV1_PERCENT_PREDICTED: 105
FEV1_PREDICTED: 2.74
FVC: 3.55
FEV1/FVC: 81.25
FEV1/FVC_PERCENT_CHANGE: 0
FEV1/FVC: 81.34
FEV1/FVC: 81
FEV1: 2.88
FEV1/FVC_PERCENT_PREDICTED: 86
FEV1/FVC_PERCENT_CHANGE: 100
FEV1_PERCENT_CHANGE: -3
FVC_PERCENT_PREDICTED: 108
FVC_PERCENT_PREDICTED: 111
FEV1_PERCENT_PREDICTED: 101
FEV1/FVC_PERCENT_PREDICTED: 93
FEV1/FVC_PERCENT_LLN: 72.33
FEV1/FVC: 81.11

## 2021-10-24 NOTE — PROCEDURES
DATE OF SERVICE:  10/22/2021     PULMONARY FUNCTION TEST INTERPRETATION     REFERRING PHYSICIAN:  Vivian Romano MD     INTERPRETING PHYSICIAN:  Lyn Horvath MD     REASON FOR STUDY:  Mild persistent asthma without complication.     STUDY ADEQUACY:  Good efforts.  The patient met ATS criteria by flow volume   loop and expiratory time.     RESULTS:  SPIROMETRY:  FVC is 3.55, which is 111% of predicted with no change postbronchodilator.  FEV1 is 2.88, which is 105% of predicted with no change postbronchodilator.  FEV1/FVC is 81.25.     INTERPRETATION:  1.  Normal spirometry.  2.  There is no significant post-bronchodilator response.  3.  There are no prior studies for comparison.        ______________________________  MD RINKU Zhao/JAILYN    DD:  10/23/2021 18:00  DT:  10/23/2021 19:22    Job#:  [id number]

## 2021-11-03 ENCOUNTER — HOSPITAL ENCOUNTER (OUTPATIENT)
Facility: MEDICAL CENTER | Age: 26
End: 2021-11-03
Attending: PREVENTIVE MEDICINE
Payer: COMMERCIAL

## 2021-11-03 ENCOUNTER — EH NON-PROVIDER (OUTPATIENT)
Dept: OCCUPATIONAL MEDICINE | Facility: CLINIC | Age: 26
End: 2021-11-03
Payer: MEDICAID

## 2021-11-03 DIAGNOSIS — Z11.59 ENCOUNTER FOR SCREENING FOR OTHER VIRAL DISEASES: Primary | ICD-10-CM

## 2021-11-03 PROCEDURE — U0003 INFECTIOUS AGENT DETECTION BY NUCLEIC ACID (DNA OR RNA); SEVERE ACUTE RESPIRATORY SYNDROME CORONAVIRUS 2 (SARS-COV-2) (CORONAVIRUS DISEASE [COVID-19]), AMPLIFIED PROBE TECHNIQUE, MAKING USE OF HIGH THROUGHPUT TECHNOLOGIES AS DESCRIBED BY CMS-2020-01-R: HCPCS | Performed by: PREVENTIVE MEDICINE

## 2021-11-04 ENCOUNTER — OFFICE VISIT (OUTPATIENT)
Dept: MEDICAL GROUP | Facility: LAB | Age: 26
End: 2021-11-04
Payer: COMMERCIAL

## 2021-11-04 VITALS
HEIGHT: 60 IN | SYSTOLIC BLOOD PRESSURE: 116 MMHG | TEMPERATURE: 97.7 F | WEIGHT: 151 LBS | RESPIRATION RATE: 12 BRPM | HEART RATE: 90 BPM | OXYGEN SATURATION: 95 % | BODY MASS INDEX: 29.64 KG/M2 | DIASTOLIC BLOOD PRESSURE: 80 MMHG

## 2021-11-04 DIAGNOSIS — F33.2 SEVERE EPISODE OF RECURRENT MAJOR DEPRESSIVE DISORDER, WITHOUT PSYCHOTIC FEATURES (HCC): ICD-10-CM

## 2021-11-04 DIAGNOSIS — Z11.59 ENCOUNTER FOR SCREENING FOR OTHER VIRAL DISEASES: ICD-10-CM

## 2021-11-04 DIAGNOSIS — F98.8 ATTENTION DEFICIT DISORDER (ADD) WITHOUT HYPERACTIVITY: ICD-10-CM

## 2021-11-04 LAB
COVID ORDER STATUS COVID19: NORMAL
SARS-COV-2 RNA RESP QL NAA+PROBE: NOTDETECTED
SPECIMEN SOURCE: NORMAL

## 2021-11-04 PROCEDURE — 99214 OFFICE O/P EST MOD 30 MIN: CPT | Performed by: FAMILY MEDICINE

## 2021-11-04 RX ORDER — DEXTROAMPHETAMINE SACCHARATE, AMPHETAMINE ASPARTATE, DEXTROAMPHETAMINE SULFATE AND AMPHETAMINE SULFATE 3.75; 3.75; 3.75; 3.75 MG/1; MG/1; MG/1; MG/1
15 TABLET ORAL 2 TIMES DAILY
Qty: 56 TABLET | Refills: 0 | Status: SHIPPED | OUTPATIENT
Start: 2021-11-04 | End: 2021-12-03 | Stop reason: SDUPTHER

## 2021-11-04 RX ORDER — MIRTAZAPINE 7.5 MG/1
7.5 TABLET, FILM COATED ORAL
Qty: 30 TABLET | Refills: 1 | Status: SHIPPED | OUTPATIENT
Start: 2021-11-04 | End: 2021-12-13

## 2021-11-04 ASSESSMENT — PATIENT HEALTH QUESTIONNAIRE - PHQ9
SUM OF ALL RESPONSES TO PHQ QUESTIONS 1-9: 20
CLINICAL INTERPRETATION OF PHQ2 SCORE: 6
5. POOR APPETITE OR OVEREATING: 2 - MORE THAN HALF THE DAYS

## 2021-11-04 ASSESSMENT — FIBROSIS 4 INDEX: FIB4 SCORE: .3762326679172919259

## 2021-11-04 NOTE — PROGRESS NOTES
"Subjective:     Chief Complaint   Patient presents with   • Medication Problem         HPI:   Isa presents today to discuss meds. Has been on Concerta for ADD in the past and seems to help initially after taking it, but then in a few hours she \"crashes\". Self treating with coffee, caffeine.     Also very depressed of late. Last meds lexapro, 20 mg, not as much effect as she would like. Effexor caused a lot of GI illness.   Zoloft didn't help. Celexa, wellbutrin as well. All seemed ineffective.     No newer experimental treatments, ketamine, or ECT, etc.     Anxiety seems more in response to ADD issues.     No counseling/therapy. Was seeing someone at [facility] counseling. Did not have good benefit.     Sleep: Taking the lunesta, sometimes helpful, sometimes too much sleep, sometimes still not enough sleep at all. Some early waking still. This is typical when not on meds.       Depression Screening    Little interest or pleasure in doing things?  3 - nearly every day   Feeling down, depressed , or hopeless? 3 - nearly every day   Trouble falling or staying asleep, or sleeping too much?  3 - nearly every day   Feeling tired or having little energy?  3 - nearly every day   Poor appetite or overeating?  2 - more than half the days   Feeling bad about yourself - or that you are a failure or have let yourself or your family down? 1 - several days   Trouble concentrating on things, such as reading the newspaper or watching television? 3 - nearly every day   Moving or speaking so slowly that other people could have noticed.  Or the opposite - being so fidgety or restless that you have been moving around a lot more than usual?  2 - more than half the days   Thoughts that you would be better off dead, or of hurting yourself?  0 - not at all   Patient Health Questionnaire Score: 20       If depressive symptoms identified deferred to follow up visit unless specifically addressed in assesment and plan.    Interpretation of PHQ-9 " Total Score   Score Severity   1-4 No Depression   5-9 Mild Depression   10-14 Moderate Depression   15-19 Moderately Severe Depression   20-27 Severe Depression            Current Outpatient Medications Ordered in Epic   Medication Sig Dispense Refill   • Eszopiclone 2 MG Tab Take 1 Tablet by mouth at bedtime as needed for up to 28 days. 28 Tablet 0   • fluticasone (FLOVENT HFA) 44 MCG/ACT Aerosol Inhale 1 Puff 2 times a day. 1 Each 1   • albuterol (PROVENTIL) 2.5mg/3ml Nebu Soln solution for nebulization Take 3 mL by nebulization every four hours as needed for Shortness of Breath (and/or wheezing). 90 mL 0   • ibuprofen (MOTRIN) 200 MG Tab Take 600 mg by mouth every 6 hours as needed.     • Levonorgestrel (MIRENA, 52 MG, IU) by Intrauterine route.     • albuterol 108 (90 Base) MCG/ACT Aero Soln inhalation aerosol Inhale 2 Puffs by mouth every 6 hours as needed for Shortness of Breath. 8.5 g 3     No current [facility]-ordered facility-administered medications on file.         ROS:  Gen: no fevers/chills, no changes in weight  Eyes: no changes in vision  ENT: no sore throat, no hearing loss, no bloody nose  Pulm: no sob, no cough  CV: no chest pain, no palpitations  GI: no nausea/vomiting, no diarrhea  : no dysuria  MSk: no myalgias  Skin: no rash  Neuro: no headaches, no numbness/tingling  Heme/Lymph: no easy bruising      Objective:     Exam:  /80 (BP Location: Left arm, Patient Position: Sitting, BP Cuff Size: Adult)   Pulse 90   Temp 36.5 °C (97.7 °F)   Resp 12   Ht 1.524 m (5')   Wt 68.5 kg (151 lb)   SpO2 95%   BMI 29.49 kg/m²  Body mass index is 29.49 kg/m².     Gen: AAOx3, NAD, well appearing  HEENT: NCAT, EOMI, Nares patent, Mucosa moist  Resp: Normal chest wall rise and fall, not SOB, no tachypnea  Skin: no rash or abnormality of visible skin.   Psych: normal speech, not slurred, good insight, affect full  MSK: Moves all four limbs equally and normally, gait normal          Assessment & Plan:      26 y.o. female with the following -     1. Attention deficit disorder (ADD) without hyperactivity  Discussed changing agent and delivery with IR version. Will also get Behavioral therapy on board.   - amphetamine-dextroamphetamine (ADDERALL) 15 MG tablet; Take 1 Tablet by mouth 2 times a day for 28 days.  Dispense: 56 Tablet; Refill: 0    2. Severe episode of recurrent major depressive disorder, without psychotic features (HCC)  Discussed possible sleep and depression benefit with mirtazapine. Will follow up on 4 weeks.   - mirtazapine (REMERON) 7.5 MG tablet; Take 1 Tablet by mouth at bedtime.  Dispense: 30 Tablet; Refill: 1  - Referral to Behavioral Health    Risks and benefits discussed.           No follow-ups on file.    Please note that this dictation was created using voice recognition software. I have made every reasonable attempt to correct obvious errors, but I expect that there are errors of grammar and possibly content that I did not discover before finalizing the note.

## 2021-11-05 ENCOUNTER — TELEPHONE (OUTPATIENT)
Dept: INTENSIVE CARE | Facility: MEDICAL CENTER | Age: 26
End: 2021-11-05

## 2021-11-08 ENCOUNTER — PATIENT MESSAGE (OUTPATIENT)
Dept: MEDICAL GROUP | Facility: LAB | Age: 26
End: 2021-11-08

## 2021-11-08 DIAGNOSIS — F33.2 SEVERE EPISODE OF RECURRENT MAJOR DEPRESSIVE DISORDER, WITHOUT PSYCHOTIC FEATURES (HCC): ICD-10-CM

## 2021-12-03 DIAGNOSIS — F98.8 ATTENTION DEFICIT DISORDER (ADD) WITHOUT HYPERACTIVITY: ICD-10-CM

## 2021-12-03 RX ORDER — DEXTROAMPHETAMINE SACCHARATE, AMPHETAMINE ASPARTATE, DEXTROAMPHETAMINE SULFATE AND AMPHETAMINE SULFATE 3.75; 3.75; 3.75; 3.75 MG/1; MG/1; MG/1; MG/1
15 TABLET ORAL 2 TIMES DAILY
Qty: 56 TABLET | Refills: 0 | Status: SHIPPED | OUTPATIENT
Start: 2021-12-03 | End: 2021-12-13

## 2021-12-03 NOTE — TELEPHONE ENCOUNTER
Received request via: Patient    Was the patient seen in the last year in this department? Yes  LOV 11/4/21  Does the patient have an active prescription (recently filled or refills available) for medication(s) requested? No

## 2021-12-13 ENCOUNTER — HOSPITAL ENCOUNTER (OUTPATIENT)
Dept: LAB | Facility: MEDICAL CENTER | Age: 26
End: 2021-12-13
Attending: FAMILY MEDICINE
Payer: COMMERCIAL

## 2021-12-13 ENCOUNTER — OFFICE VISIT (OUTPATIENT)
Dept: MEDICAL GROUP | Facility: LAB | Age: 26
End: 2021-12-13
Payer: COMMERCIAL

## 2021-12-13 VITALS
RESPIRATION RATE: 12 BRPM | DIASTOLIC BLOOD PRESSURE: 80 MMHG | HEIGHT: 59 IN | WEIGHT: 140 LBS | OXYGEN SATURATION: 96 % | TEMPERATURE: 98.3 F | HEART RATE: 93 BPM | SYSTOLIC BLOOD PRESSURE: 124 MMHG | BODY MASS INDEX: 28.22 KG/M2

## 2021-12-13 DIAGNOSIS — E55.9 VITAMIN D DEFICIENCY: ICD-10-CM

## 2021-12-13 DIAGNOSIS — F98.8 ATTENTION DEFICIT DISORDER (ADD) WITHOUT HYPERACTIVITY: ICD-10-CM

## 2021-12-13 PROCEDURE — 82306 VITAMIN D 25 HYDROXY: CPT

## 2021-12-13 PROCEDURE — 99214 OFFICE O/P EST MOD 30 MIN: CPT | Performed by: FAMILY MEDICINE

## 2021-12-13 PROCEDURE — 36415 COLL VENOUS BLD VENIPUNCTURE: CPT

## 2021-12-13 RX ORDER — DEXTROAMPHETAMINE SACCHARATE, AMPHETAMINE ASPARTATE, DEXTROAMPHETAMINE SULFATE AND AMPHETAMINE SULFATE 5; 5; 5; 5 MG/1; MG/1; MG/1; MG/1
20 TABLET ORAL 2 TIMES DAILY
Qty: 56 EACH | Refills: 0 | Status: SHIPPED | OUTPATIENT
Start: 2021-12-13 | End: 2022-01-13 | Stop reason: SDUPTHER

## 2021-12-13 RX ORDER — CLONAZEPAM 0.5 MG/1
TABLET ORAL
COMMUNITY
Start: 2021-12-05 | End: 2022-04-05 | Stop reason: SDUPTHER

## 2021-12-13 ASSESSMENT — FIBROSIS 4 INDEX: FIB4 SCORE: .3762326679172919259

## 2021-12-13 NOTE — PROGRESS NOTES
Subjective:     Chief Complaint   Patient presents with   • Follow-Up         HPI:   Isa presents today to follow up mental health and sleep. She did see sleep medicine who started clonazepam for sleep.     Sleeping much better now, 6-8 hours a night. Sleep medicine saw today and continuing meds as is. Will rehceck again in 6 months with them.       Also notes with starting Adderall her depression is much better, in addition ot getting better sleep.   Better than concerta.   First dose at 7 -8am , then reduces around 11 am , then takes next dose around noon-1 pm, to be able to get through work.   Does have improved focus , more attentive to patients needs, coworkers needs, more attentive to kids and at home as well.       Has lost a bit of weight of late too.   Appetite is good. No insomnia now. No mood concerns right now.   Does report some achy joints, hips, knees , elbows. Noticed for the last month or so. No swelling. Doesn't seem to be in muscles. In the past has been Vit d deficient.          Current Outpatient Medications Ordered in Epic   Medication Sig Dispense Refill   • amphetamine-dextroamphetamine (ADDERALL) 20 MG Tab Take 1 Tablet by mouth 2 times a day for 28 days. 56 Each 0   • ibuprofen (MOTRIN) 200 MG Tab Take 600 mg by mouth every 6 hours as needed.     • albuterol 108 (90 Base) MCG/ACT Aero Soln inhalation aerosol Inhale 2 Puffs by mouth every 6 hours as needed for Shortness of Breath. 8.5 g 3   • clonazePAM (KLONOPIN) 0.5 MG Tab      • fluticasone (FLOVENT HFA) 44 MCG/ACT Aerosol Inhale 1 Puff 2 times a day. 1 Each 1   • albuterol (PROVENTIL) 2.5mg/3ml Nebu Soln solution for nebulization Take 3 mL by nebulization every four hours as needed for Shortness of Breath (and/or wheezing). 90 mL 0   • Levonorgestrel (MIRENA, 52 MG, IU) by Intrauterine route.       No current [facility]-ordered facility-administered medications on file.         ROS:  Gen: no fevers/chills, no changes in weight  Eyes: no  "changes in vision  ENT: no sore throat, no hearing loss, no bloody nose  Pulm: no sob, no cough  CV: no chest pain, no palpitations  GI: no nausea/vomiting, no diarrhea  : no dysuria  MSk: no myalgias  Skin: no rash  Neuro: no headaches, no numbness/tingling  Heme/Lymph: no easy bruising      Objective:     Exam:  /80 (BP Location: Right arm, Patient Position: Sitting, BP Cuff Size: Adult)   Pulse 93   Temp 36.8 °C (98.3 °F)   Resp 12   Ht 1.499 m (4' 11\")   Wt 63.5 kg (140 lb)   SpO2 96%   BMI 28.28 kg/m²  Body mass index is 28.28 kg/m².  Gen: AAOx3, NAD, well appearing  HEENT: NCAT, EOMI, Nares patent, Mucosa moist  Resp: Normal chest wall rise and fall, not SOB, no tachypnea  Skin: no rash or abnormality of visible skin.   Psych: normal speech, not slurred, good insight, affect full  MSK: Moves all four limbs equally and normally, gait normal        Assessment & Plan:     26 y.o. female with the following -     /1. Attention deficit disorder (ADD) without hyperactivity  Discussed adjusting her dose to see if she gets a little bit more duration of action for her medication.  Discussed risks and benefits.  She should continue to take it on the same routine at the same times per day as she is currently she does not notice any improvement we will go back down to the 15 mg so that she is on more medication than she needs.  - amphetamine-dextroamphetamine (ADDERALL) 20 MG Tab; Take 1 Tablet by mouth 2 times a day for 28 days.  Dispense: 56 Each; Refill: 0    2. Vitamin D deficiency  Discussed possibility for vitamin D deficiency and will check this today in the lab.  We will let her know what we will do for supplementation if needed afterwards.  If we do supplement in this remedies issues we will continue treatment.  Otherwise we may consider that this might be related to medication started recently.  - VITAMIN D,25 HYDROXY; Future            No follow-ups on file.    Please note that this dictation was " created using voice recognition software. I have made every reasonable attempt to correct obvious errors, but I expect that there are errors of grammar and possibly content that I did not discover before finalizing the note.

## 2021-12-15 LAB — 25(OH)D3 SERPL-MCNC: 24 NG/ML (ref 30–80)

## 2021-12-16 DIAGNOSIS — E55.9 VITAMIN D DEFICIENCY: ICD-10-CM

## 2021-12-16 RX ORDER — ERGOCALCIFEROL 1.25 MG/1
50000 CAPSULE ORAL
Qty: 12 CAPSULE | Refills: 3 | Status: SHIPPED | OUTPATIENT
Start: 2021-12-16 | End: 2022-07-22 | Stop reason: SDUPTHER

## 2022-01-13 DIAGNOSIS — F98.8 ATTENTION DEFICIT DISORDER (ADD) WITHOUT HYPERACTIVITY: ICD-10-CM

## 2022-01-13 RX ORDER — DEXTROAMPHETAMINE SACCHARATE, AMPHETAMINE ASPARTATE, DEXTROAMPHETAMINE SULFATE AND AMPHETAMINE SULFATE 5; 5; 5; 5 MG/1; MG/1; MG/1; MG/1
20 TABLET ORAL 2 TIMES DAILY
Qty: 56 EACH | Refills: 0 | Status: SHIPPED | OUTPATIENT
Start: 2022-01-13 | End: 2022-02-17 | Stop reason: SDUPTHER

## 2022-01-21 ENCOUNTER — HOSPITAL ENCOUNTER (OUTPATIENT)
Facility: MEDICAL CENTER | Age: 27
End: 2022-01-21
Payer: COMMERCIAL

## 2022-01-21 LAB — COVID ORDER STATUS COVID19: NORMAL

## 2022-01-22 LAB
SARS-COV-2 RNA RESP QL NAA+PROBE: NOTDETECTED
SPECIMEN SOURCE: NORMAL
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
## 2022-02-17 DIAGNOSIS — F98.8 ATTENTION DEFICIT DISORDER (ADD) WITHOUT HYPERACTIVITY: ICD-10-CM

## 2022-02-17 RX ORDER — DEXTROAMPHETAMINE SACCHARATE, AMPHETAMINE ASPARTATE, DEXTROAMPHETAMINE SULFATE AND AMPHETAMINE SULFATE 5; 5; 5; 5 MG/1; MG/1; MG/1; MG/1
20 TABLET ORAL 2 TIMES DAILY
Qty: 56 EACH | Refills: 0 | Status: SHIPPED | OUTPATIENT
Start: 2022-02-17 | End: 2022-03-25 | Stop reason: SDUPTHER

## 2022-02-17 NOTE — TELEPHONE ENCOUNTER
Received request via: Patient    Was the patient seen in the last year in this department? Yes  LOV 12/13/2021  Does the patient have an active prescription (recently filled or refills available) for medication(s) requested? No

## 2022-03-25 DIAGNOSIS — F98.8 ATTENTION DEFICIT DISORDER (ADD) WITHOUT HYPERACTIVITY: ICD-10-CM

## 2022-03-25 RX ORDER — DEXTROAMPHETAMINE SACCHARATE, AMPHETAMINE ASPARTATE, DEXTROAMPHETAMINE SULFATE AND AMPHETAMINE SULFATE 5; 5; 5; 5 MG/1; MG/1; MG/1; MG/1
20 TABLET ORAL 2 TIMES DAILY
Qty: 56 EACH | Refills: 0 | Status: SHIPPED | OUTPATIENT
Start: 2022-03-25 | End: 2022-04-25 | Stop reason: SDUPTHER

## 2022-03-25 NOTE — TELEPHONE ENCOUNTER
Received request via: Pharmacy    Was the patient seen in the last year in this department? Yes  12/13/2021  Does the patient have an active prescription (recently filled or refills available) for medication(s) requested? No

## 2022-04-05 ENCOUNTER — HOSPITAL ENCOUNTER (OUTPATIENT)
Facility: MEDICAL CENTER | Age: 27
End: 2022-04-05
Attending: FAMILY MEDICINE
Payer: COMMERCIAL

## 2022-04-05 ENCOUNTER — OFFICE VISIT (OUTPATIENT)
Dept: MEDICAL GROUP | Facility: LAB | Age: 27
End: 2022-04-05
Payer: COMMERCIAL

## 2022-04-05 VITALS
DIASTOLIC BLOOD PRESSURE: 74 MMHG | BODY MASS INDEX: 25 KG/M2 | HEIGHT: 59 IN | HEART RATE: 114 BPM | OXYGEN SATURATION: 97 % | RESPIRATION RATE: 12 BRPM | WEIGHT: 124 LBS | TEMPERATURE: 97.3 F | SYSTOLIC BLOOD PRESSURE: 118 MMHG

## 2022-04-05 DIAGNOSIS — F51.01 PRIMARY INSOMNIA: ICD-10-CM

## 2022-04-05 DIAGNOSIS — R11.0 NAUSEA: ICD-10-CM

## 2022-04-05 PROBLEM — G47.00 INSOMNIA: Status: ACTIVE | Noted: 2022-04-05

## 2022-04-05 LAB
APPEARANCE UR: ABNORMAL
BILIRUB UR STRIP-MCNC: ABNORMAL MG/DL
COLOR UR AUTO: YELLOW
GLUCOSE BLD-MCNC: 81 MG/DL (ref 70–100)
GLUCOSE UR STRIP.AUTO-MCNC: ABNORMAL MG/DL
KETONES UR STRIP.AUTO-MCNC: ABNORMAL MG/DL
LEUKOCYTE ESTERASE UR QL STRIP.AUTO: ABNORMAL
NITRITE UR QL STRIP.AUTO: ABNORMAL
PH UR STRIP.AUTO: 8.5 [PH] (ref 5–8)
PROT UR QL STRIP: ABNORMAL MG/DL
RBC UR QL AUTO: ABNORMAL
SP GR UR STRIP.AUTO: 1.02
UROBILINOGEN UR STRIP-MCNC: 1 MG/DL

## 2022-04-05 PROCEDURE — 87510 GARDNER VAG DNA DIR PROBE: CPT

## 2022-04-05 PROCEDURE — 99213 OFFICE O/P EST LOW 20 MIN: CPT | Performed by: FAMILY MEDICINE

## 2022-04-05 PROCEDURE — 87480 CANDIDA DNA DIR PROBE: CPT

## 2022-04-05 PROCEDURE — 87660 TRICHOMONAS VAGIN DIR PROBE: CPT

## 2022-04-05 PROCEDURE — 81002 URINALYSIS NONAUTO W/O SCOPE: CPT | Performed by: FAMILY MEDICINE

## 2022-04-05 PROCEDURE — 82962 GLUCOSE BLOOD TEST: CPT | Performed by: FAMILY MEDICINE

## 2022-04-05 RX ORDER — CLONAZEPAM 0.5 MG/1
0.5 TABLET ORAL NIGHTLY
Qty: 30 TABLET | Refills: 3 | Status: SHIPPED | OUTPATIENT
Start: 2022-04-05 | End: 2022-05-15 | Stop reason: SDUPTHER

## 2022-04-05 RX ORDER — PROMETHAZINE HYDROCHLORIDE 25 MG/1
25 TABLET ORAL EVERY 6 HOURS PRN
Qty: 30 TABLET | Refills: 0 | Status: SHIPPED | OUTPATIENT
Start: 2022-04-05 | End: 2022-08-01

## 2022-04-05 ASSESSMENT — FIBROSIS 4 INDEX: FIB4 SCORE: .3762326679172919259

## 2022-04-05 NOTE — PROGRESS NOTES
Subjective:     Chief Complaint   Patient presents with   • Dizziness     [place] x 2 days         HPI:   Isa presents today with nausea and dizziness for a couple days. Has been getting worse.   Dizziness, fainted, no vomiting, but severely pukey feeling.   Sweaty.   No exposures to stomach bugs. More constipation of late. Sometimes miralax, some stool softeners now and then. This has not been helpful of late. Forced herself to eat today, breakfast.   Has IUD in place.   No partner changes, some increased discharge. Not really sexually active at this time.   No other illness, cough, cold, no fevers, but sweaty, clammy.   Possibly more urinary urgency or frequency.       Current Outpatient Medications Ordered in Epic   Medication Sig Dispense Refill   • amphetamine-dextroamphetamine (ADDERALL) 20 MG Tab Take 1 Tablet by mouth 2 times a day for 28 days. 56 Each 0   • vitamin D2, Ergocalciferol, (DRISDOL) 1.25 MG (78137 UT) Cap capsule Take 1 Capsule by mouth every 7 days. 12 Capsule 3   • clonazePAM (KLONOPIN) 0.5 MG Tab      • fluticasone (FLOVENT HFA) 44 MCG/ACT Aerosol Inhale 1 Puff 2 times a day. 1 Each 1   • albuterol (PROVENTIL) 2.5mg/3ml Nebu Soln solution for nebulization Take 3 mL by nebulization every four hours as needed for Shortness of Breath (and/or wheezing). 90 mL 0   • ibuprofen (MOTRIN) 200 MG Tab Take 600 mg by mouth every 6 hours as needed.     • Levonorgestrel (MIRENA, 52 MG, IU) by Intrauterine route.     • albuterol 108 (90 Base) MCG/ACT Aero Soln inhalation aerosol Inhale 2 Puffs by mouth every 6 hours as needed for Shortness of Breath. 8.5 g 3     No current [facility]-ordered facility-administered medications on file.         ROS:  Gen: no fevers/chills, no changes in weight  Eyes: no changes in vision  ENT: no sore throat, no hearing loss, no bloody nose  Pulm: no sob, no cough  CV: no chest pain, no palpitations  GI: no nausea/vomiting, no diarrhea  : no dysuria  MSk: no myalgias  Skin: no  "rash  Neuro: no headaches, no numbness/tingling  Heme/Lymph: no easy bruising      Objective:     Exam:  /74 (BP Location: Right arm, Patient Position: Sitting, BP Cuff Size: Adult)   Pulse (!) 114   Temp 36.3 °C (97.3 °F)   Resp 12   Ht 1.499 m (4' 11\")   Wt 56.2 kg (124 lb)   SpO2 97%   BMI 25.04 kg/m²  Body mass index is 25.04 kg/m².    Gen: Alert and oriented, No apparent distress.  Neck: Neck is supple without lymphadenopathy.  Lungs: Normal effort, CTA bilaterally, no wheezes, rhonchi, or rales  CV: Regular rate and rhythm. No murmurs, rubs, or gallops.  Ext: No clubbing, cyanosis, edema.  : Physiologic discharge present.  Cervix appears normal.  No cervical motion tenderness.  Vaginal vault is without any masses or lesions.  Vaginal pathogen panel collected.    Assessment & Plan:     26 y.o. female with the following -     1. Nausea  Discussed no overt  sources of nausea at this point.  We will treat what ever pops up on the vaginal pathogens panel.  Reassured that her glucose looks good.  Urinalysis also looks normal.  We will try to use some symptom-based medication with Phenergan and she should try to push fluids certainly.  - POCT Glucose  - POCT Urinalysis  - VAGINAL PATHOGENS DNA PANEL; Future    2. Primary insomnia  Chronic, stable.  Continue current medications as is.  - clonazePAM (KLONOPIN) 0.5 MG Tab; Take 1 Tablet by mouth every evening for 30 days.  Dispense: 30 Tablet; Refill: 3            No follow-ups on file.    Please note that this dictation was created using voice recognition software. I have made every reasonable attempt to correct obvious errors, but I expect that there are errors of grammar and possibly content that I did not discover before finalizing the note.      "

## 2022-04-06 ENCOUNTER — PATIENT MESSAGE (OUTPATIENT)
Dept: MEDICAL GROUP | Facility: LAB | Age: 27
End: 2022-04-06
Payer: COMMERCIAL

## 2022-04-06 DIAGNOSIS — E55.9 VITAMIN D DEFICIENCY: ICD-10-CM

## 2022-04-06 DIAGNOSIS — Z00.00 HEALTH CARE MAINTENANCE: ICD-10-CM

## 2022-04-06 DIAGNOSIS — R11.0 NAUSEA: ICD-10-CM

## 2022-04-06 RX ORDER — METRONIDAZOLE 500 MG/1
500 TABLET ORAL
Qty: 14 TABLET | Refills: 0 | Status: SHIPPED | OUTPATIENT
Start: 2022-04-06 | End: 2022-04-13

## 2022-04-18 ENCOUNTER — HOSPITAL ENCOUNTER (OUTPATIENT)
Dept: LAB | Facility: MEDICAL CENTER | Age: 27
End: 2022-04-18
Attending: FAMILY MEDICINE
Payer: COMMERCIAL

## 2022-04-18 DIAGNOSIS — Z00.00 HEALTH CARE MAINTENANCE: ICD-10-CM

## 2022-04-18 DIAGNOSIS — E55.9 VITAMIN D DEFICIENCY: ICD-10-CM

## 2022-04-18 LAB
25(OH)D3 SERPL-MCNC: 28 NG/ML (ref 30–100)
ALBUMIN SERPL BCP-MCNC: 4.4 G/DL (ref 3.2–4.9)
ALBUMIN/GLOB SERPL: 1.8 G/DL
ALP SERPL-CCNC: 51 U/L (ref 30–99)
ALT SERPL-CCNC: 8 U/L (ref 2–50)
ANION GAP SERPL CALC-SCNC: 11 MMOL/L (ref 7–16)
AST SERPL-CCNC: 16 U/L (ref 12–45)
BASOPHILS # BLD AUTO: 0.3 % (ref 0–1.8)
BASOPHILS # BLD: 0.02 K/UL (ref 0–0.12)
BILIRUB SERPL-MCNC: 0.6 MG/DL (ref 0.1–1.5)
BUN SERPL-MCNC: 10 MG/DL (ref 8–22)
CALCIUM SERPL-MCNC: 9.1 MG/DL (ref 8.5–10.5)
CHLORIDE SERPL-SCNC: 108 MMOL/L (ref 96–112)
CHOLEST SERPL-MCNC: 116 MG/DL (ref 100–199)
CO2 SERPL-SCNC: 22 MMOL/L (ref 20–33)
CREAT SERPL-MCNC: 0.6 MG/DL (ref 0.5–1.4)
EOSINOPHIL # BLD AUTO: 0.02 K/UL (ref 0–0.51)
EOSINOPHIL NFR BLD: 0.3 % (ref 0–6.9)
ERYTHROCYTE [DISTWIDTH] IN BLOOD BY AUTOMATED COUNT: 45.4 FL (ref 35.9–50)
EST. AVERAGE GLUCOSE BLD GHB EST-MCNC: 85 MG/DL
FASTING STATUS PATIENT QL REPORTED: NORMAL
GFR SERPLBLD CREATININE-BSD FMLA CKD-EPI: 125 ML/MIN/1.73 M 2
GLOBULIN SER CALC-MCNC: 2.5 G/DL (ref 1.9–3.5)
GLUCOSE SERPL-MCNC: 81 MG/DL (ref 65–99)
HBA1C MFR BLD: 4.6 % (ref 4–5.6)
HCT VFR BLD AUTO: 37 % (ref 37–47)
HDLC SERPL-MCNC: 60 MG/DL
HGB BLD-MCNC: 11.9 G/DL (ref 12–16)
IMM GRANULOCYTES # BLD AUTO: 0.02 K/UL (ref 0–0.11)
IMM GRANULOCYTES NFR BLD AUTO: 0.3 % (ref 0–0.9)
LDLC SERPL CALC-MCNC: 45 MG/DL
LYMPHOCYTES # BLD AUTO: 2.27 K/UL (ref 1–4.8)
LYMPHOCYTES NFR BLD: 32 % (ref 22–41)
MCH RBC QN AUTO: 29.8 PG (ref 27–33)
MCHC RBC AUTO-ENTMCNC: 32.2 G/DL (ref 33.6–35)
MCV RBC AUTO: 92.5 FL (ref 81.4–97.8)
MONOCYTES # BLD AUTO: 0.43 K/UL (ref 0–0.85)
MONOCYTES NFR BLD AUTO: 6.1 % (ref 0–13.4)
NEUTROPHILS # BLD AUTO: 4.33 K/UL (ref 2–7.15)
NEUTROPHILS NFR BLD: 61 % (ref 44–72)
NRBC # BLD AUTO: 0 K/UL
NRBC BLD-RTO: 0 /100 WBC
PLATELET # BLD AUTO: 284 K/UL (ref 164–446)
PMV BLD AUTO: 11.6 FL (ref 9–12.9)
POTASSIUM SERPL-SCNC: 4.3 MMOL/L (ref 3.6–5.5)
PROT SERPL-MCNC: 6.9 G/DL (ref 6–8.2)
RBC # BLD AUTO: 4 M/UL (ref 4.2–5.4)
SODIUM SERPL-SCNC: 141 MMOL/L (ref 135–145)
TRIGL SERPL-MCNC: 54 MG/DL (ref 0–149)
TSH SERPL DL<=0.005 MIU/L-ACNC: 2.05 UIU/ML (ref 0.38–5.33)
WBC # BLD AUTO: 7.1 K/UL (ref 4.8–10.8)

## 2022-04-18 PROCEDURE — 80053 COMPREHEN METABOLIC PANEL: CPT

## 2022-04-18 PROCEDURE — 80061 LIPID PANEL: CPT

## 2022-04-18 PROCEDURE — 85025 COMPLETE CBC W/AUTO DIFF WBC: CPT

## 2022-04-18 PROCEDURE — 82306 VITAMIN D 25 HYDROXY: CPT

## 2022-04-18 PROCEDURE — 83036 HEMOGLOBIN GLYCOSYLATED A1C: CPT

## 2022-04-18 PROCEDURE — 36415 COLL VENOUS BLD VENIPUNCTURE: CPT

## 2022-04-18 PROCEDURE — 84443 ASSAY THYROID STIM HORMONE: CPT

## 2022-04-25 DIAGNOSIS — F98.8 ATTENTION DEFICIT DISORDER (ADD) WITHOUT HYPERACTIVITY: ICD-10-CM

## 2022-04-25 RX ORDER — DEXTROAMPHETAMINE SACCHARATE, AMPHETAMINE ASPARTATE, DEXTROAMPHETAMINE SULFATE AND AMPHETAMINE SULFATE 5; 5; 5; 5 MG/1; MG/1; MG/1; MG/1
20 TABLET ORAL 2 TIMES DAILY
Qty: 56 EACH | Refills: 0 | Status: SHIPPED | OUTPATIENT
Start: 2022-04-25 | End: 2022-05-25 | Stop reason: SDUPTHER

## 2022-04-25 NOTE — TELEPHONE ENCOUNTER
Received request via: Pharmacy    Was the patient seen in the last year in this department? Yes  4/5/2022  Does the patient have an active prescription (recently filled or refills available) for medication(s) requested? No

## 2022-05-15 DIAGNOSIS — F51.01 PRIMARY INSOMNIA: ICD-10-CM

## 2022-05-16 RX ORDER — CLONAZEPAM 0.5 MG/1
0.5 TABLET ORAL NIGHTLY
Qty: 30 TABLET | Refills: 3 | Status: SHIPPED | OUTPATIENT
Start: 2022-05-16 | End: 2022-06-13

## 2022-05-18 ENCOUNTER — OFFICE VISIT (OUTPATIENT)
Dept: URGENT CARE | Facility: CLINIC | Age: 27
End: 2022-05-18
Payer: COMMERCIAL

## 2022-05-18 VITALS
OXYGEN SATURATION: 100 % | HEIGHT: 59 IN | RESPIRATION RATE: 16 BRPM | SYSTOLIC BLOOD PRESSURE: 122 MMHG | DIASTOLIC BLOOD PRESSURE: 84 MMHG | WEIGHT: 124 LBS | TEMPERATURE: 98 F | BODY MASS INDEX: 25 KG/M2 | HEART RATE: 98 BPM

## 2022-05-18 DIAGNOSIS — M62.838 MUSCLE SPASMS OF NECK: ICD-10-CM

## 2022-05-18 DIAGNOSIS — S16.1XXA STRAIN OF NECK MUSCLE, INITIAL ENCOUNTER: ICD-10-CM

## 2022-05-18 PROCEDURE — 99213 OFFICE O/P EST LOW 20 MIN: CPT | Performed by: PHYSICIAN ASSISTANT

## 2022-05-18 RX ORDER — CYCLOBENZAPRINE HCL 10 MG
10 TABLET ORAL 3 TIMES DAILY PRN
Qty: 14 TABLET | Refills: 0 | Status: SHIPPED | OUTPATIENT
Start: 2022-05-18 | End: 2022-08-01

## 2022-05-18 ASSESSMENT — FIBROSIS 4 INDEX: FIB4 SCORE: 0.54

## 2022-05-18 NOTE — PROGRESS NOTES
"Subjective:   Isa Mcclure is a 26 y.o. female who presents for Neck Pain (Woke up with stiffness on neck and shoulders area painful to move neck )      HPI  Patient presents to the clinic with complaints of neck pain and neck stiffness onset this morning.  She states early this morning she turned over in bed and heard a crack.  Later on in the morning she woke up with stiffness and tightness and pain.  The neck pain is worse on the right side.  She has limited range of motion due to tightness and stiffness as well as pain.  No numbness or tingling.  No weakness.  No radiation pain other than to her trapezius area.  Denies any lower back or mid back pain.  Denies any recent illness, fevers, chills.  Denies any sore throat.         Medications:    • albuterol Aers  • amphetamine-dextroamphetamine Tabs  • clonazePAM Tabs  • ibuprofen Tabs  • MIRENA (52 MG) IU  • promethazine Tabs  • vitamin D2 (Ergocalciferol) Caps    Allergies: Bactrim ds and Hydrocodone    Problem List: Isa Mcclure does not have any pertinent problems on file.    Surgical History:  Past Surgical History:   Procedure Laterality Date   • CHOLECYSTECTOMY         Past Social Hx: Isa Mcclure  reports that she has quit smoking. Her smoking use included cigarettes. She has never used smokeless tobacco. She reports previous alcohol use. She reports previous drug use. Drug: Marijuana.     Past Family Hx:  Isa Mcclure family history includes Diabetes in her mother; Heart Disease (age of onset: 60) in her mother; Hyperlipidemia in her father and mother; Hypertension in her father, mother, and paternal grandmother; No Known Problems in her brother, brother, maternal aunt, sister, sister, and sister; Psychiatric Illness in her brother and mother.     Problem list, medications, and allergies reviewed by myself today in Epic.     Objective:     /84   Pulse 98   Temp 36.7 °C (98 °F) (Temporal)   Resp 16   Ht 1.499 m (4' 11\")   " Wt 56.2 kg (124 lb)   SpO2 100%   BMI 25.04 kg/m²     Physical Exam  Vitals reviewed.   Constitutional:       General: She is not in acute distress.     Appearance: Normal appearance. She is not ill-appearing or toxic-appearing.   HENT:      Mouth/Throat:      Mouth: Mucous membranes are moist.      Pharynx: Oropharynx is clear.   Eyes:      Conjunctiva/sclera: Conjunctivae normal.      Pupils: Pupils are equal, round, and reactive to light.   Cardiovascular:      Rate and Rhythm: Normal rate and regular rhythm.      Heart sounds: Normal heart sounds.   Pulmonary:      Effort: Pulmonary effort is normal.      Breath sounds: Normal breath sounds.   Musculoskeletal:      Cervical back: No edema or crepitus. Pain with movement and muscular tenderness (left paraspinal muscles ) present. No spinous process tenderness. Decreased range of motion (secondary to spasms and pain ).   Skin:     General: Skin is warm and dry.   Neurological:      General: No focal deficit present.      Mental Status: She is alert and oriented to person, place, and time.   Psychiatric:         Mood and Affect: Mood normal.         Behavior: Behavior normal.         Diagnosis and associated orders:     1. Muscle spasms of neck  - cyclobenzaprine (FLEXERIL) 10 mg Tab; Take 1 Tablet by mouth 3 times a day as needed for Moderate Pain or Muscle Spasms.  Dispense: 14 Tablet; Refill: 0    2. Strain of neck muscle, initial encounter  - cyclobenzaprine (FLEXERIL) 10 mg Tab; Take 1 Tablet by mouth 3 times a day as needed for Moderate Pain or Muscle Spasms.  Dispense: 14 Tablet; Refill: 0       Comments/MDM:     Patient's presenting symptoms and exam findings consistent with possible cervical strain and neck spasms.    Treatment of initial rest with no heavy lifting, stooping, or strenuous activity. Massage, deep blue rub, ice and/or heat which ever feels better, and Ibuprofen per manufacture's instructions. Encouraged walking, stretching, and range of  motion exercises as tolerated. Avoid sitting or laying down for long periods of time except for at night during sleep.   Treatment of cyclobenzaprine.   Discussed with patient this medication can cause drowsiness/sedation. The patient was instructed to use medication mostly during the evening/night time. Instructed to avoid driving, operating machinery, or drinking alcohol while using this medication. Patient verbalized understanding and agreement.   Patient is overall very well-appearing, no acute distress, and normal vital signs.  Follow up with your PCP or return to urgent care if symptoms not improving in 1-2 weeks.      I personally reviewed prior external notes and test results pertinent to today's visit. Supportive care, natural history, differential diagnoses, and indications for immediate follow-up discussed. Patient expresses understanding and agrees to plan. Patient denies any other questions or concerns.     Follow-up with the primary care physician for recheck, reevaluation, and consideration of further management.    Please note that this dictation was created using voice recognition software. I have made a reasonable attempt to correct obvious errors, but I expect that there are errors of grammar and possibly content that I did not discover before finalizing the note.    This note was electronically signed by Danny Osborne PA-C

## 2022-05-25 DIAGNOSIS — F98.8 ATTENTION DEFICIT DISORDER (ADD) WITHOUT HYPERACTIVITY: ICD-10-CM

## 2022-05-25 NOTE — TELEPHONE ENCOUNTER
Received request via: Pharmacy    Was the patient seen in the last year in this department? Yes  LOV : 5/18/2022  Does the patient have an active prescription (recently filled or refills available) for medication(s) requested? No

## 2022-05-26 RX ORDER — DEXTROAMPHETAMINE SACCHARATE, AMPHETAMINE ASPARTATE, DEXTROAMPHETAMINE SULFATE AND AMPHETAMINE SULFATE 5; 5; 5; 5 MG/1; MG/1; MG/1; MG/1
20 TABLET ORAL 2 TIMES DAILY
Qty: 56 EACH | Refills: 0 | Status: SHIPPED | OUTPATIENT
Start: 2022-05-26 | End: 2022-06-13 | Stop reason: SDUPTHER

## 2022-06-13 ENCOUNTER — OFFICE VISIT (OUTPATIENT)
Dept: MEDICAL GROUP | Facility: LAB | Age: 27
End: 2022-06-13
Payer: COMMERCIAL

## 2022-06-13 ENCOUNTER — HOSPITAL ENCOUNTER (OUTPATIENT)
Dept: RADIOLOGY | Facility: MEDICAL CENTER | Age: 27
End: 2022-06-13
Attending: FAMILY MEDICINE
Payer: COMMERCIAL

## 2022-06-13 VITALS
DIASTOLIC BLOOD PRESSURE: 80 MMHG | RESPIRATION RATE: 12 BRPM | HEART RATE: 85 BPM | OXYGEN SATURATION: 98 % | HEIGHT: 59 IN | TEMPERATURE: 97.3 F | SYSTOLIC BLOOD PRESSURE: 120 MMHG | BODY MASS INDEX: 25 KG/M2 | WEIGHT: 124 LBS

## 2022-06-13 DIAGNOSIS — F51.01 PRIMARY INSOMNIA: ICD-10-CM

## 2022-06-13 DIAGNOSIS — M54.2 NECK PAIN: ICD-10-CM

## 2022-06-13 DIAGNOSIS — F98.8 ATTENTION DEFICIT DISORDER (ADD) WITHOUT HYPERACTIVITY: ICD-10-CM

## 2022-06-13 PROCEDURE — 72040 X-RAY EXAM NECK SPINE 2-3 VW: CPT

## 2022-06-13 PROCEDURE — 99214 OFFICE O/P EST MOD 30 MIN: CPT | Performed by: FAMILY MEDICINE

## 2022-06-13 RX ORDER — CLONAZEPAM 1 MG/1
1 TABLET ORAL NIGHTLY
Qty: 30 TABLET | Refills: 0
Start: 2022-06-13 | End: 2022-07-13

## 2022-06-13 RX ORDER — DEXTROAMPHETAMINE SACCHARATE, AMPHETAMINE ASPARTATE, DEXTROAMPHETAMINE SULFATE AND AMPHETAMINE SULFATE 7.5; 7.5; 7.5; 7.5 MG/1; MG/1; MG/1; MG/1
30 TABLET ORAL 2 TIMES DAILY
Qty: 56 TABLET | Refills: 0 | Status: SHIPPED | OUTPATIENT
Start: 2022-06-23 | End: 2022-07-22 | Stop reason: SDUPTHER

## 2022-06-13 ASSESSMENT — PATIENT HEALTH QUESTIONNAIRE - PHQ9: CLINICAL INTERPRETATION OF PHQ2 SCORE: 0

## 2022-06-13 ASSESSMENT — FIBROSIS 4 INDEX: FIB4 SCORE: 0.54

## 2022-06-13 NOTE — PROGRESS NOTES
Subjective:     Chief Complaint   Patient presents with   • Neck Pain     Sleep wrong hurts neck area x 1 month          HPI:   Isa presents today with neck pain. Radiating to shoulder . Thinks it is related to sleep.   Has been recurrent. Rolling over in bed will cuase a pop and immediate pain and stiffness in neck and upper back. This past weekend no pop, but stiffness, pain , cant move, mostly right side.   Did buy a new pillow just recently, much better after buying a cervical pillow.   Icing and heating, trying 800 mg motrin and tylenol every 8 hours. Topical pain cream as well. Tried cyclobenzaprine which helped sleep, but not pain.   Left hand dominant.       Has tried chiro in the past. Not much benefit or relief.       Current Outpatient Medications Ordered in Epic   Medication Sig Dispense Refill   • clonazePAM (KLONOPIN) 0.5 MG Tab Take 1 Tablet by mouth every evening for 30 days. (Patient taking differently: Take 1 mg by mouth every evening.) 30 Tablet 3   • amphetamine-dextroamphetamine (ADDERALL) 20 MG Tab Take 1 Tablet by mouth 2 times a day for 28 days. 56 Each 0   • cyclobenzaprine (FLEXERIL) 10 mg Tab Take 1 Tablet by mouth 3 times a day as needed for Moderate Pain or Muscle Spasms. 14 Tablet 0   • promethazine (PHENERGAN) 25 MG Tab Take 1 Tablet by mouth every 6 hours as needed for Nausea/Vomiting. 30 Tablet 0   • vitamin D2, Ergocalciferol, (DRISDOL) 1.25 MG (03250 UT) Cap capsule Take 1 Capsule by mouth every 7 days. 12 Capsule 3   • ibuprofen (MOTRIN) 200 MG Tab Take 600 mg by mouth every 6 hours as needed.     • Levonorgestrel (MIRENA, 52 MG, IU) by Intrauterine route.     • albuterol 108 (90 Base) MCG/ACT Aero Soln inhalation aerosol Inhale 2 Puffs by mouth every 6 hours as needed for Shortness of Breath. 8.5 g 3     No current [facility]-ordered facility-administered medications on file.         ROS:  Gen: no fevers/chills, no changes in weight  Eyes: no changes in vision  ENT: no sore throat,  "no hearing loss, no bloody nose  Pulm: no sob, no cough  CV: no chest pain, no palpitations  GI: no nausea/vomiting, no diarrhea  : no dysuria  MSk: no myalgias  Skin: no rash  Neuro: no headaches, no numbness/tingling  Heme/Lymph: no easy bruising      Objective:     Exam:  /80 (BP Location: Right arm, Patient Position: Sitting, BP Cuff Size: Adult)   Pulse 85   Temp 36.3 °C (97.3 °F)   Resp 12   Ht 1.499 m (4' 11\")   Wt 56.2 kg (124 lb)   SpO2 98%   BMI 25.04 kg/m²  Body mass index is 25.04 kg/m².  Gen: AAOx3, NAD, well appearing  HEENT: NCAT, EOMI, Nares patent, Mucosa moist  Resp: Normal chest wall rise and fall, not SOB, no tachypnea  Skin: no rash or abnormality of visible skin.   Psych: normal speech, not slurred, good insight, affect full  MSK: Moves all four limbs equally and normally, gait normal.  Neck has very limited range of motion with pain and tightness on forward flexion as well as looking left and worse on looking right.  Pain is localized to the cervical paraspinal muscles as well as trapezius and down into the upper back as well.  DTRs are normal and equal bilateral in the upper extremity.  Sensation is intact.  No red flags noted for possible cervical spinal stenosis.        Assessment & Plan:     26 y.o. female with the following -     1. Primary insomnia  Discussed changing dosing but the sleep clinic today.  Continue to monitor this.  I do agree with her getting a new pillow and we will see how long this goes for benefit.  - clonazePAM (KLONOPIN) 1 MG Tab; Take 1 Tablet by mouth every evening for 30 days.  Dispense: 30 Tablet; Refill: 0    2. Neck pain  Discussed getting some imaging to start with plain films to assess alignment possible need for more advanced imaging.  We will also get her referred to physical therapy and consider dry needling as well.  Okay to continue ibuprofen and Tylenol alternating for now as well.  - DX-CERVICAL SPINE-2 OR 3 VIEWS; Future  - Referral to " Physical Therapy    3. Attention deficit disorder (ADD) without hyperactivity  Discussed that her ADD meds seem to be wearing off a little bit earlier.  She is aware of tolerance and is wondering if this is related.  We will see if we can go ahead and go up on her dose a little bit and see how she does with this.  We did discuss that there will be a ceiling for this and we cannot continue to increase that exponentially.  She will let me know how she does when she feels this on her next fill in about 10 days.  - amphetamine-dextroamphetamine (ADDERALL) 30 MG tablet; Take 1 Tablet by mouth 2 times a day for 28 days.  Dispense: 56 Tablet; Refill: 0            No follow-ups on file.    Please note that this dictation was created using voice recognition software. I have made every reasonable attempt to correct obvious errors, but I expect that there are errors of grammar and possibly content that I did not discover before finalizing the note.

## 2022-07-15 ENCOUNTER — PATIENT MESSAGE (OUTPATIENT)
Dept: MEDICAL GROUP | Facility: LAB | Age: 27
End: 2022-07-15
Payer: COMMERCIAL

## 2022-07-15 DIAGNOSIS — N93.9 VAGINAL BLEEDING: ICD-10-CM

## 2022-07-22 DIAGNOSIS — E55.9 VITAMIN D DEFICIENCY: ICD-10-CM

## 2022-07-22 DIAGNOSIS — F98.8 ATTENTION DEFICIT DISORDER (ADD) WITHOUT HYPERACTIVITY: ICD-10-CM

## 2022-07-22 RX ORDER — DEXTROAMPHETAMINE SACCHARATE, AMPHETAMINE ASPARTATE, DEXTROAMPHETAMINE SULFATE AND AMPHETAMINE SULFATE 7.5; 7.5; 7.5; 7.5 MG/1; MG/1; MG/1; MG/1
30 TABLET ORAL 2 TIMES DAILY
Qty: 56 TABLET | Refills: 0 | Status: SHIPPED | OUTPATIENT
Start: 2022-07-22 | End: 2022-08-19 | Stop reason: SDUPTHER

## 2022-07-22 RX ORDER — ERGOCALCIFEROL 1.25 MG/1
50000 CAPSULE ORAL
Qty: 12 CAPSULE | Refills: 3 | Status: SHIPPED | OUTPATIENT
Start: 2022-07-22 | End: 2023-02-23

## 2022-07-22 NOTE — TELEPHONE ENCOUNTER
Received request via: Pharmacy    Was the patient seen in the last year in this department? Yes  6/13/2022  Does the patient have an active prescription (recently filled or refills available) for medication(s) requested? No

## 2022-07-25 ENCOUNTER — APPOINTMENT (OUTPATIENT)
Dept: RADIOLOGY | Facility: MEDICAL CENTER | Age: 27
End: 2022-07-25
Attending: EMERGENCY MEDICINE
Payer: COMMERCIAL

## 2022-07-25 ENCOUNTER — PHYSICAL THERAPY (OUTPATIENT)
Dept: PHYSICAL THERAPY | Facility: REHABILITATION | Age: 27
End: 2022-07-25
Attending: FAMILY MEDICINE
Payer: COMMERCIAL

## 2022-07-25 ENCOUNTER — HOSPITAL ENCOUNTER (EMERGENCY)
Facility: MEDICAL CENTER | Age: 27
End: 2022-07-25
Attending: EMERGENCY MEDICINE
Payer: COMMERCIAL

## 2022-07-25 VITALS
DIASTOLIC BLOOD PRESSURE: 83 MMHG | RESPIRATION RATE: 16 BRPM | SYSTOLIC BLOOD PRESSURE: 114 MMHG | TEMPERATURE: 97.5 F | WEIGHT: 120.59 LBS | OXYGEN SATURATION: 100 % | BODY MASS INDEX: 24.36 KG/M2 | HEART RATE: 78 BPM

## 2022-07-25 DIAGNOSIS — R51.9 ACUTE NONINTRACTABLE HEADACHE, UNSPECIFIED HEADACHE TYPE: ICD-10-CM

## 2022-07-25 DIAGNOSIS — M54.2 NECK PAIN: ICD-10-CM

## 2022-07-25 DIAGNOSIS — R42 DIZZINESS: ICD-10-CM

## 2022-07-25 LAB
ALBUMIN SERPL BCP-MCNC: 5 G/DL (ref 3.2–4.9)
ALBUMIN/GLOB SERPL: 1.6 G/DL
ALP SERPL-CCNC: 65 U/L (ref 30–99)
ALT SERPL-CCNC: 14 U/L (ref 2–50)
ANION GAP SERPL CALC-SCNC: 14 MMOL/L (ref 7–16)
APTT PPP: 33.8 SEC (ref 24.7–36)
AST SERPL-CCNC: 26 U/L (ref 12–45)
BASOPHILS # BLD AUTO: 0.1 % (ref 0–1.8)
BASOPHILS # BLD: 0.01 K/UL (ref 0–0.12)
BILIRUB SERPL-MCNC: 0.7 MG/DL (ref 0.1–1.5)
BUN SERPL-MCNC: 17 MG/DL (ref 8–22)
CALCIUM SERPL-MCNC: 9.6 MG/DL (ref 8.5–10.5)
CHLORIDE SERPL-SCNC: 105 MMOL/L (ref 96–112)
CO2 SERPL-SCNC: 23 MMOL/L (ref 20–33)
CREAT SERPL-MCNC: 0.68 MG/DL (ref 0.5–1.4)
EOSINOPHIL # BLD AUTO: 0.16 K/UL (ref 0–0.51)
EOSINOPHIL NFR BLD: 1.9 % (ref 0–6.9)
ERYTHROCYTE [DISTWIDTH] IN BLOOD BY AUTOMATED COUNT: 43.3 FL (ref 35.9–50)
GFR SERPLBLD CREATININE-BSD FMLA CKD-EPI: 122 ML/MIN/1.73 M 2
GLOBULIN SER CALC-MCNC: 3.2 G/DL (ref 1.9–3.5)
GLUCOSE SERPL-MCNC: 59 MG/DL (ref 65–99)
HCG SERPL QL: NEGATIVE
HCT VFR BLD AUTO: 42.2 % (ref 37–47)
HGB BLD-MCNC: 14.5 G/DL (ref 12–16)
IMM GRANULOCYTES # BLD AUTO: 0.03 K/UL (ref 0–0.11)
IMM GRANULOCYTES NFR BLD AUTO: 0.4 % (ref 0–0.9)
INR PPP: 1.02 (ref 0.87–1.13)
LYMPHOCYTES # BLD AUTO: 2.32 K/UL (ref 1–4.8)
LYMPHOCYTES NFR BLD: 27.2 % (ref 22–41)
MCH RBC QN AUTO: 30.3 PG (ref 27–33)
MCHC RBC AUTO-ENTMCNC: 34.4 G/DL (ref 33.6–35)
MCV RBC AUTO: 88.3 FL (ref 81.4–97.8)
MONOCYTES # BLD AUTO: 0.44 K/UL (ref 0–0.85)
MONOCYTES NFR BLD AUTO: 5.2 % (ref 0–13.4)
NEUTROPHILS # BLD AUTO: 5.58 K/UL (ref 2–7.15)
NEUTROPHILS NFR BLD: 65.2 % (ref 44–72)
NRBC # BLD AUTO: 0 K/UL
NRBC BLD-RTO: 0 /100 WBC
PLATELET # BLD AUTO: 350 K/UL (ref 164–446)
PMV BLD AUTO: 10.8 FL (ref 9–12.9)
POTASSIUM SERPL-SCNC: 3.5 MMOL/L (ref 3.6–5.5)
PROT SERPL-MCNC: 8.2 G/DL (ref 6–8.2)
PROTHROMBIN TIME: 13.3 SEC (ref 12–14.6)
RBC # BLD AUTO: 4.78 M/UL (ref 4.2–5.4)
SODIUM SERPL-SCNC: 142 MMOL/L (ref 135–145)
WBC # BLD AUTO: 8.5 K/UL (ref 4.8–10.8)

## 2022-07-25 PROCEDURE — 70496 CT ANGIOGRAPHY HEAD: CPT

## 2022-07-25 PROCEDURE — 85610 PROTHROMBIN TIME: CPT

## 2022-07-25 PROCEDURE — 70498 CT ANGIOGRAPHY NECK: CPT

## 2022-07-25 PROCEDURE — 85025 COMPLETE CBC W/AUTO DIFF WBC: CPT

## 2022-07-25 PROCEDURE — 85730 THROMBOPLASTIN TIME PARTIAL: CPT

## 2022-07-25 PROCEDURE — 99283 EMERGENCY DEPT VISIT LOW MDM: CPT

## 2022-07-25 PROCEDURE — 0042T CT-CEREBRAL PERFUSION ANALYSIS: CPT

## 2022-07-25 PROCEDURE — 84703 CHORIONIC GONADOTROPIN ASSAY: CPT

## 2022-07-25 PROCEDURE — 80053 COMPREHEN METABOLIC PANEL: CPT

## 2022-07-25 PROCEDURE — 36415 COLL VENOUS BLD VENIPUNCTURE: CPT

## 2022-07-25 PROCEDURE — 700117 HCHG RX CONTRAST REV CODE 255: Performed by: EMERGENCY MEDICINE

## 2022-07-25 PROCEDURE — 97161 PT EVAL LOW COMPLEX 20 MIN: CPT

## 2022-07-25 RX ADMIN — IOHEXOL 80 ML: 350 INJECTION, SOLUTION INTRAVENOUS at 15:14

## 2022-07-25 RX ADMIN — IOHEXOL 40 ML: 350 INJECTION, SOLUTION INTRAVENOUS at 15:12

## 2022-07-25 ASSESSMENT — ENCOUNTER SYMPTOMS
PAIN SCALE: 3
PAIN SCALE AT HIGHEST: 5
PAIN SCALE AT LOWEST: 3

## 2022-07-25 ASSESSMENT — FIBROSIS 4 INDEX: FIB4 SCORE: 0.56

## 2022-07-25 NOTE — ED NOTES
Pt sitting upright in chair with no obvious distress at this time. Discharge information and instructions given/discussed with Pt. Pt acknowledged information. Pt self ambulated to  [employer] without difficulty for her ride.

## 2022-07-25 NOTE — OP THERAPY EVALUATION
Outpatient Physical Therapy  INITIAL EVALUATION    [facility] [address].  [address]  [place] [address]  Phone:  [phone]  Fax:  [phone]    Date of Evaluation: 07/25/2022    Patient: Isa Mcclure  YOB: 1995  MRN: [id number]     Referring Provider: Vivian Romano M.D.  [address] [facility] [address]  [place] [address]   Referring Diagnosis Neck pain [M54.2]     Time Calculation  Start time: 0950  Stop time: 1030 Time Calculation (min): 40 minutes     Chief Complaint: No chief complaint on file.    Visit Diagnoses     ICD-10-CM   1. Neck pain  M54.2       Date of onset of impairment: No data found    Subjective:   History of Present Illness:     Mechanism of injury:  Beginning of June started having neck pain, nothing triggered it, and reports idiopathic neck pain.  Reached at night for daughter, felt a pop, and extreme pain.  In May had a pop, stretching overhead popped, and painful. Reports 5 years ago, had a pop, stretching overhead in bed and felt a pop.  Pain can last for 1-2 days after the pop, and she feels like she can't move because of the pain.  X-rays normal.  Started getting HA - reports horse shoe pattern, occurs on both sides together.  Can just back of the head stiffness.  Patient reports that pain is not getting better, but she has good moments, and that bad moments.      Pain Description:  Suboccipital to upper traps, R>L, can be both sides, but usually once side or the other.  Denies pain into arm, but stiffness. Reports that pain can be so severe she can't move.    Pain Irritability:  High (can be day).   Pain AM/PM Pattern:  Worse 1 hr in the morning.      Better:  New pillow helped, heat, movement (a little)   Worse:  Pillow, running, overhead press (twitch/popping), rowing activities (makes it stiff)  Tried: Ibuprofen, massage (felt like it triggered it), chiropractic helps with tension, but doesn't really help with  "anything else.  Still seeing as needed.     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  Has worked there for about 1 year and 4 month.  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     Further conversation   Dysphagia, Diploplia, Dizziness, and Dysarthria noted.  Reports occasional nausea.    Reports \"eyes went dark\" and felt weird post manipulation from chiropractor   Reports extreme pain, like nothing she has felt, when neck pops.   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   • Urinary tract infection      Past Surgical History:   Procedure Laterality Date   • CHOLECYSTECTOMY       Social History     Tobacco Use   • Smoking status: Former Smoker     Types: Cigarettes   • Smokeless tobacco: Never Used   Substance Use Topics   • Alcohol use: Not Currently     Family and Occupational History     Socioeconomic History   • Marital status:      Spouse name: Not on file   • Number of children: Not on file   • Years of education: Not on file   • Highest education level: Associate degree: occupational, technical, or vocational program   Occupational History   • Not on file       Objective     Shoulder Screen    Shoulder active range of motion within functional limits.  Shoulder range of motion within functional limits with the following exceptions: Painful with overhead motions   Full thoracic mobility    Palpation     Additional Palpation Details  Global tenderness into SCM, suboccipitals and upper trapezius     Active Range of Motion     Cervical Spine   Flexion: " "decreased  Extension: decreased  Retraction: decreased  Left lateral flexion: decreased  Right lateral flexion: decreased  Left rotation: decreased  Right rotation: decreased    Additional Active Range of Motion Details  Pain increased with all motions, worse with flexion and extension activities     Passive Range of Motion     Cervical Spine   Flexion: decreased  Extension: decreased  Retraction: decreased  Left lateral flexion: decreased  Right lateral flexion: decreased  Left rotation: decreased  Right rotation: decreased    General Comments     Spine Comments   Repeated chin tucks - improvement of ROM in supine.             Therapeutic Exercises (CPT 08452):       Therapeutic Exercise Summary:   HEP - none today      Time-based treatments/modalities:    Physical Therapy Timed Treatment Charges  Therapeutic exercise minutes (CPT 06454): 10 minutes      Assessment, Response and Plan:   Impairments: impaired functional mobility, lacks appropriate home exercise program and limited ADL's    Assessment details:  Patient referred to ER secondary to 4/5 Ds, and 1/3 Ns, referral in internal carotid pattern, some jaw/ear pain, reports odd \"darkness\" of vision with adjustment done by chiropractor, and episodes of severe pain.  Patient appear to have mix of ischemic and non ischemic vascular signs.  Held internal carotid testing secondary to risks.  Educated that if vascular concerns ruled out, return to PT for potential cervicogenic causes.  Offered to call ambulance, patient elected to drive today.     Once medically cleared, will complete evaluation and provide full assessment.   Goals:   Short Term Goals:   Patient will follow up with ER   Short term goal time span:  1-2 weeks      Long Term Goals:    Evaluation will be completed and new goals written next visit  Long term goal time span:  2-4 weeks    Plan:   Therapy options:  Referred back to MD (referral to ER, and if cleared of concern, return to PT)  Planned " therapy interventions:  Neuromuscular Re-education (CPT 35135), E Stim Unattended (CPT 62374), Manual Therapy (CPT 52697), Therapeutic Exercise (CPT 93239) and Therapeutic Activities (CPT 03614)  Frequency:  1x week (once medically cleared)  Duration in weeks:  8  Duration in visits:  8  Discussed with:  Patient    Referring provider co-signature:  I have reviewed this plan of care and my co-signature certifies the need for services.    Certification Period: 07/25/2022 to  09/24/22    Physician Signature: ________________________________ Date: ______________

## 2022-07-25 NOTE — ED TRIAGE NOTES
.Isa Mcclure  .  Chief Complaint   Patient presents with   • Dizziness     X 2-3 months   • Visual Problems     Patient c/o double and blurry vision x 1 month     Patient ambulatory to triage with above complaints. Patient reports she went to PT today and was sent here.      Patient to lobby and instructed to inform staff of any needs.

## 2022-07-25 NOTE — ED PROVIDER NOTES
"ED Provider Note    CHIEF COMPLAINT  Chief Complaint   Patient presents with   • Dizziness     X 2-3 months   • Visual Problems     Patient c/o double and blurry vision x 1 month       HPI  Isa Mcclure is a 27 y.o. female who presents for evaluation of dizziness.  The patient states that in May she thought she had slept wrong on her bed and she got up and felt a pop in her neck and then had severe pain.  Patient then had associated dizziness.  Patient states she is well for another month and then it occurred again last week.  X-rays and neck were taken by her primary care which showed some \"mild arthritis\".  The patient had another episode of sudden headache along with blurred vision.  The patient was evaluated by physical therapy and was sent to the ER as they were concerned about potential stroke symptomatology.  The patient had no associated: Fever, URI symptoms, cardiorespiratory symptoms, gastrointestinal symptoms, unilateral motor weakness or paresthesias.  Patient is  Ab1.  Patient has a history of drug abuse but has been sober for several years.  No other complaints.    REVIEW OF SYSTEMS  See HPI for further details.  No history of hypertension, diabetes, thyroid dysfunction, seizures, cardiopulmonary disorders.  All other systems negative.    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   • Urinary tract infection        FAMILY HISTORY  Family History   Problem Relation Age of Onset   • Hypertension Mother    • Diabetes Mother    • Hyperlipidemia Mother    • Heart Disease Mother 60        MI   • Psychiatric Illness Mother         depression, ADD   • Hypertension Father    • Hyperlipidemia Father    • Psychiatric Illness Brother         depression   • No Known Problems Sister    • No Known Problems " Maternal Aunt    • No Known Problems Brother    • No Known Problems Brother    • No Known Problems Sister    • No Known Problems Sister    • Hypertension Paternal Grandmother        SOCIAL HISTORY  Resides locally;    SURGICAL HISTORY  Past Surgical History:   Procedure Laterality Date   • CHOLECYSTECTOMY         CURRENT MEDICATIONS  Home Medications     Reviewed by Jaelyn Rosenthal R.N. (Registered Nurse) on 07/25/22 at 1146  Med List Status: Partial   Medication Last Dose Status   albuterol 108 (90 Base) MCG/ACT Aero Soln inhalation aerosol  Active   amphetamine-dextroamphetamine (ADDERALL) 30 MG tablet  Active   cyclobenzaprine (FLEXERIL) 10 mg Tab  Active   ibuprofen (MOTRIN) 200 MG Tab  Active   Levonorgestrel (MIRENA, 52 MG, IU)  Active   promethazine (PHENERGAN) 25 MG Tab  Active   vitamin D2, Ergocalciferol, (DRISDOL) 1.25 MG (88393 UT) Cap capsule  Active                ALLERGIES  Allergies   Allergen Reactions   • Bactrim Ds Hives     hives   • Hydrocodone Itching and Nausea       PHYSICAL EXAM  VITAL SIGNS: /75   Pulse 66   Temp 36.2 °C (97.2 °F) (Temporal)   Resp 16   Wt 54.7 kg (120 lb 9.5 oz)   LMP 07/13/2022 (Approximate)   SpO2 98%   BMI 24.36 kg/m²    Constitutional: 27-year-old female, well developed, Well nourished, No acute distress, Non-toxic appearance.   HENT: Normocephalic, Atraumatic, Nares:Clear, Oropharynx: moist, well hydrated, posterior pharynx:clear   Eyes: PERRL, EOMI, Conjunctiva normal, No discharge.   Neck: Normal range of motion, No tenderness, Supple, No stridor.   Lymphatic: No lymphadenopathy noted.   Cardiovascular: Regular rate and rhythm without mumurs, gallups, rubs   Thorax & Lungs: Normal Equal breath sounds, No respiratory distress, No wheezing, no stridor, no rales. No chest tenderness.   Abdomen: Soft, nontender, nondistended, no organomegaly, positive bowel sounds normal in quality. No guarding or rebound.  Skin: Good skin turgor, pink, warm, dry. No  rashes, petechiae, purpura. Normal capillary refill.   Back: No tenderness, No CVA tenderness.   Extremities: Intact distal pulses, No edema, No tenderness, No cyanosis,  Vascular: Pulses are 2+, symmetric in the upper and lower extremities.  Musculoskeletal: Good range of motion in all major joints. No tenderness to palpation or major deformities noted.   Neurologic: Alert & oriented x 3,  No gross focal deficits noted.   Psychiatric: Affect normal, Judgment normal, Mood normal.       RADIOLOGY/PROCEDURES  CT-CTA HEAD WITH & W/O-POST PROCESS   Final Result      CT angiogram of the [place] within normal limits.      CT-CEREBRAL PERFUSION ANALYSIS   Final Result      1.Cerebral blood flow less than 30% likely representing completed infarct = 0 mL   2.T Max more than 6 seconds likely representing combination of completed infarct and ischemia = 0 mL   3. Mismatched volume likely representing ischemic brain/penumbra= 0 mL   4.Please note that the cerebral perfusion was performed on the limited brain tissue around the basal ganglia region. Infarct/ischemia outside the CT perfusion sections may not be seen on this study.      CT-CTA NECK WITH & W/O-POST PROCESSING    (Results Pending)         COURSE & MEDICAL DECISION MAKING  Pertinent Labs & Imaging studies reviewed. (See chart for details)  1.  Saline lock    Laboratory studies: CBC shows white count of 8.5, 65% neutrophils, 27% lymphocytes, 5% monocytes, hemoglobin 14.5, hematocrit 42.2; CMP shows potassium 3.5, glucose 59, otherwise within normal;    Discussion: At this time, the patient has had several episodes of acute headache some of which could possibly be subarachnoid bleed.  Patient underwent work-up to ensure no evidence of any aneurysm or AVM.  NIH is 0.  Imaging studies were negative.  Based on the findings and clinical exam, the patient is stable for discharge.  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  I discussed the findings treat  plan with patient.  She indicates she is comfortable with this explanation disposition.      FINAL IMPRESSION  1. Acute nonintractable headache, unspecified headache type    2. Dizziness           PLAN  1.  Appropriate discharge instructions given  2.  Follow-up with physical therapy  3.  Follow-up with primary care    Electronically signed by: Guy G Gansert, M.D., 7/25/2022 1:01 PM

## 2022-08-01 ENCOUNTER — OFFICE VISIT (OUTPATIENT)
Dept: MEDICAL GROUP | Facility: LAB | Age: 27
End: 2022-08-01
Payer: COMMERCIAL

## 2022-08-01 VITALS
HEART RATE: 125 BPM | DIASTOLIC BLOOD PRESSURE: 78 MMHG | TEMPERATURE: 97.4 F | SYSTOLIC BLOOD PRESSURE: 128 MMHG | WEIGHT: 122.8 LBS | OXYGEN SATURATION: 100 % | BODY MASS INDEX: 24.76 KG/M2 | RESPIRATION RATE: 16 BRPM | HEIGHT: 59 IN

## 2022-08-01 DIAGNOSIS — E87.6 HYPOKALEMIA: ICD-10-CM

## 2022-08-01 DIAGNOSIS — E16.2 HYPOGLYCEMIA: ICD-10-CM

## 2022-08-01 PROCEDURE — 99214 OFFICE O/P EST MOD 30 MIN: CPT | Performed by: FAMILY MEDICINE

## 2022-08-01 RX ORDER — CLONAZEPAM 1 MG/1
1 TABLET ORAL
COMMUNITY
Start: 2022-07-21 | End: 2022-11-23 | Stop reason: SDUPTHER

## 2022-08-01 ASSESSMENT — FIBROSIS 4 INDEX: FIB4 SCORE: 0.56

## 2022-08-01 NOTE — PROGRESS NOTES
Subjective:     Chief Complaint   Patient presents with   • Hospital Follow-up         HPI:   Isa presents today with ER follow up for headaches. CTA head was normal. Has felt tired since then. Still HA present, right sided Bilateral neck pain lately as well.   Ibuprofen gives some relief. No muscle relaxer use.   Does get some dizziness with standing up. Pulse today 125.   Good with hydration 100 ounces a day. Glucose and potassium a bit low.       Last meal couple hours ago.   Not much OTC meds, only a protein shake, with superfood and collagen peptides.   Not overusing albuterol.     Current Outpatient Medications Ordered in Epic   Medication Sig Dispense Refill   • vitamin D2, Ergocalciferol, (DRISDOL) 1.25 MG (26248 UT) Cap capsule Take 1 Capsule by mouth every 7 days. 12 Capsule 3   • amphetamine-dextroamphetamine (ADDERALL) 30 MG tablet Take 1 Tablet by mouth 2 times a day for 28 days. 56 Tablet 0   • cyclobenzaprine (FLEXERIL) 10 mg Tab Take 1 Tablet by mouth 3 times a day as needed for Moderate Pain or Muscle Spasms. 14 Tablet 0   • promethazine (PHENERGAN) 25 MG Tab Take 1 Tablet by mouth every 6 hours as needed for Nausea/Vomiting. 30 Tablet 0   • ibuprofen (MOTRIN) 200 MG Tab Take 600 mg by mouth every 6 hours as needed.     • Levonorgestrel (MIRENA, 52 MG, IU) by Intrauterine route.     • albuterol 108 (90 Base) MCG/ACT Aero Soln inhalation aerosol Inhale 2 Puffs by mouth every 6 hours as needed for Shortness of Breath. 8.5 g 3     No current [facility]-ordered facility-administered medications on file.         ROS:  Gen: no fevers/chills, no changes in weight  Eyes: no changes in vision  ENT: no sore throat, no hearing loss, no bloody nose  Pulm: no sob, no cough  CV: no chest pain, no palpitations  GI: no nausea/vomiting, no diarrhea  : no dysuria  MSk: no myalgias  Skin: no rash  Neuro: no headaches, no numbness/tingling  Heme/Lymph: no easy bruising      Objective:     Exam:  /78 (BP Location:  "Right arm)   Pulse (!) 125   Temp 36.3 °C (97.4 °F)   Resp 16   Ht 1.499 m (4' 11\")   Wt 55.7 kg (122 lb 12.8 oz)   LMP 07/13/2022 (Approximate)   SpO2 100%   BMI 24.80 kg/m²  Body mass index is 24.8 kg/m².    Gen: Alert and oriented, No apparent distress.  Neck: Neck is supple without lymphadenopathy.  Lungs: Normal effort, CTA bilaterally, no wheezes, rhonchi, or rales  CV: Regular rate and rhythm. No murmurs, rubs, or gallops.  Ext: No clubbing, cyanosis, edema.      Assessment & Plan:     27 y.o. female with the following -     1. Hypokalemia  Reviewed her labs, imaging, and notes from the ER.  Not quite sure what to make of her low potassium we will get this rechecked today.  She does not have any risk factors for certain medications that would cause this.  She also does eat fairly well and uses a protein shake which is fortified with a lot of vitamins and minerals.  If she continues to be low we will have to supplement her but then also do more work to find out why she is either not absorbing or losing potassium.  - Basic Metabolic Panel; Future    2. Hypoglycemia  Discussed also hypoglycemia potential effect of Adderall with this as well.  This will be rechecked on her basic metabolic panel.  May consider Holter monitor or Zio patch with the tachycardia that she has today as well for further assessment and evaluation.  - Basic Metabolic Panel; Future            No follow-ups on file.    Please note that this dictation was created using voice recognition software. I have made every reasonable attempt to correct obvious errors, but I expect that there are errors of grammar and possibly content that I did not discover before finalizing the note.      "

## 2022-08-08 ENCOUNTER — PHYSICAL THERAPY (OUTPATIENT)
Dept: PHYSICAL THERAPY | Facility: REHABILITATION | Age: 27
End: 2022-08-08
Attending: FAMILY MEDICINE
Payer: COMMERCIAL

## 2022-08-08 ENCOUNTER — HOSPITAL ENCOUNTER (OUTPATIENT)
Dept: RADIOLOGY | Facility: MEDICAL CENTER | Age: 27
End: 2022-08-08
Attending: FAMILY MEDICINE
Payer: COMMERCIAL

## 2022-08-08 DIAGNOSIS — M54.2 NECK PAIN: ICD-10-CM

## 2022-08-08 DIAGNOSIS — N93.9 VAGINAL BLEEDING: ICD-10-CM

## 2022-08-08 PROCEDURE — 97112 NEUROMUSCULAR REEDUCATION: CPT

## 2022-08-08 PROCEDURE — 97110 THERAPEUTIC EXERCISES: CPT

## 2022-08-08 PROCEDURE — 76830 TRANSVAGINAL US NON-OB: CPT

## 2022-08-08 NOTE — OP THERAPY DAILY TREATMENT
Outpatient Physical Therapy  DAILY TREATMENT     [facility] [address].  [address]  [place] [address]  Phone:  [phone]  Fax:  [phone]    Date: 08/08/2022    Patient: Isa Mcclure  YOB: 1995  MRN: [id number]     Time Calculation    Start time: 0945  Stop time: 1030 Time Calculation (min): 45 minutes     Chief Complaint: No chief complaint on file.    Visit #: 2    SUBJECTIVE:  Patient followed up with ER and MD, both not concerned with current symptoms have medical cause, specifically vascular.   Getting labs done for potassium and blood sugar.     Reports stiff on the left side.  Getting HA at least 1/2 of the week, lasting a couple hours.      OBJECTIVE:  See evaluation, continues to have ischemic / non ischemic signs, defer to MD team for management.     L rotation   75% limited, 4-5/10 pain at start  Full ROM, 4-5/10 end range pain at end of session.              Therapeutic Exercises (CPT 20591):       Therapeutic Exercise Summary:   HEP   chin tucks supine - 5x - 3-5x/day  Angles - to fatigue x 1 - 3-5x/day  Postural education    Therapeutic Treatments and Modalities:     Therapeutic Treatment and Modalities Summary:   Chin tucks in supine - 3 x 5   Chin tucks against wall - 6 x 5   Scapular Squeezes - 1 x 10 seconds  Alicia against wall - 3 sets to fatigue   C/s proprioception drills - relocation - next visit  C/s isometrics - next visit if tolerated  Rotational C1-C2 mobilizations, grade III, reproduction of upper trap symptoms, indicating joint referral.     Namibian 5/5, Lower trap scapular squeezes + BP cuff chin tucks.  (next visit)    All exercises performed to technical failure    Time-based treatments/modalities:    Physical Therapy Timed Treatment Charges  Neuromusc re-ed, balance, coor, post minutes (CPT 20050): 15 minutes  Therapeutic exercise minutes (CPT 87186): 30 minutes    ASSESSMENT:   Defer to medical team ruling out  vascular/medical cause with non-ischemic and some ischemic signs.  Since ruled out, further evaluated and began treatments for cervicogenic HAs.     Educated on DOMs, which are likely, postural education, and no lingering symptoms.     Good tolerance in terms of ROM, no change in pain this session.     PLAN/RECOMMENDATIONS:   DNF endurance   Postural strengthening  Postural education

## 2022-08-19 DIAGNOSIS — F98.8 ATTENTION DEFICIT DISORDER (ADD) WITHOUT HYPERACTIVITY: ICD-10-CM

## 2022-08-19 RX ORDER — DEXTROAMPHETAMINE SACCHARATE, AMPHETAMINE ASPARTATE, DEXTROAMPHETAMINE SULFATE AND AMPHETAMINE SULFATE 7.5; 7.5; 7.5; 7.5 MG/1; MG/1; MG/1; MG/1
30 TABLET ORAL 2 TIMES DAILY
Qty: 56 TABLET | Refills: 0 | Status: SHIPPED | OUTPATIENT
Start: 2022-08-19 | End: 2022-09-16 | Stop reason: SDUPTHER

## 2022-08-19 NOTE — TELEPHONE ENCOUNTER
Received request via: Patient    Was the patient seen in the last year in this department? Yes  LOV 08/01/2022  Does the patient have an active prescription (recently filled or refills available) for medication(s) requested? No

## 2022-09-12 ENCOUNTER — PHYSICAL THERAPY (OUTPATIENT)
Dept: PHYSICAL THERAPY | Facility: REHABILITATION | Age: 27
End: 2022-09-12
Attending: FAMILY MEDICINE
Payer: COMMERCIAL

## 2022-09-12 DIAGNOSIS — M54.2 NECK PAIN: ICD-10-CM

## 2022-09-12 PROCEDURE — 97110 THERAPEUTIC EXERCISES: CPT

## 2022-09-12 PROCEDURE — 97140 MANUAL THERAPY 1/> REGIONS: CPT

## 2022-09-12 NOTE — OP THERAPY DAILY TREATMENT
Outpatient Physical Therapy  DAILY TREATMENT     [facility] [address]  [address]  [place] [address]  Phone:  [phone]  Fax:  [phone]    Date: 09/12/2022    Patient: Isa Mcclure  YOB: 1995  MRN: [id number]     Time Calculation    Start time: 1030  Stop time: 1115 Time Calculation (min): 45 minutes     Chief Complaint: Neck Pain    Visit #: 3    SUBJECTIVE:  Patient states she returned from a trip and driving has aggravated her neck on right! She also cannot do the HEP to Ty's specification (every hour). 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  Labs were done for potassium and blood sugar, but she has not had time for any medical f/us.     Reports stiff on the Right side.  Has not had a HA in a while    OBJECTIVE:  C/S AROM  rotation L:55//60 R 50 //55 deg. P+ EOR.    Pain  4-5/10 pain at start  2-3/10 pain at end of sessi        Therapeutic Exercises (CPT 66615):     1. reviewed current HEP, see below      Therapeutic Exercise Summary: Current HEP:   Chin tucks in supine - 3 x 5   Chin tucks against wall - 6 x 5   Scapular Squeezes - 1 x 10 seconds  Alicia against wall - 3 sets to fatigue         Therapeutic Treatments and Modalities: , MHP C/S pre MT    2. Manual Therapy (CPT 42444), IASTM to bilat.(R>L) UT, L.Scapulae    3. Manual Therapy (CPT 72631), Manuel tape to bilat C-T/s PSM 50% TOT    4. Self Care ADL Training (CPT 82823), removal of tape instructions, also ergonomic set up of monitor and adjust window to eye-level if possible.  Time-based treatments/modalities:    Physical Therapy Timed Treatment Charges  Manual therapy minutes (CPT 41075): 25 minutes  Therapeutic exercise minutes (CPT 11261): 15 minutes  ASSESSMENT:   Good tolerance in terms of ROM and change in pain this session. 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    Defer to medical team ruling out vascular/medical cause of HAs    PLAN/RECOMMENDATIONS:   Continue PT. Pt. Will need to watch out and adjust her work station for decreased sx.    Postural strengthening: Thai 5/5, Lower trap scapular squeezes + BP cuff chin tucks.

## 2022-09-16 DIAGNOSIS — F98.8 ATTENTION DEFICIT DISORDER (ADD) WITHOUT HYPERACTIVITY: ICD-10-CM

## 2022-09-16 RX ORDER — DEXTROAMPHETAMINE SACCHARATE, AMPHETAMINE ASPARTATE, DEXTROAMPHETAMINE SULFATE AND AMPHETAMINE SULFATE 7.5; 7.5; 7.5; 7.5 MG/1; MG/1; MG/1; MG/1
30 TABLET ORAL 2 TIMES DAILY
Qty: 56 TABLET | Refills: 0 | Status: SHIPPED | OUTPATIENT
Start: 2022-09-16 | End: 2022-10-14 | Stop reason: SDUPTHER

## 2022-09-19 ENCOUNTER — PHYSICAL THERAPY (OUTPATIENT)
Dept: PHYSICAL THERAPY | Facility: REHABILITATION | Age: 27
End: 2022-09-19
Attending: FAMILY MEDICINE
Payer: COMMERCIAL

## 2022-09-19 DIAGNOSIS — M54.2 NECK PAIN: ICD-10-CM

## 2022-09-19 PROCEDURE — 97110 THERAPEUTIC EXERCISES: CPT

## 2022-09-19 PROCEDURE — 97140 MANUAL THERAPY 1/> REGIONS: CPT

## 2022-09-19 PROCEDURE — 97535 SELF CARE MNGMENT TRAINING: CPT

## 2022-09-19 NOTE — OP THERAPY DAILY TREATMENT
Outpatient Physical Therapy  DAILY TREATMENT     [facility] Physical [address].  [address]  [place] [address]  Phone:  [phone]  Fax:  [phone]    Date: 09/19/2022    Patient: Isa Mcclure  YOB: 1995  MRN: [id number]     Time Calculation    Start time: 0944  Stop time: 1030 Time Calculation (min): 46 minutes       Chief Complaint: Neck Pain    Visit #: 4    SUBJECTIVE:  Pt. reports stiffness on both sides for days. She said the tape helped prompt her postural mechanics. She is still working in a different work site w/raised monitors.  Pain  4/10 pain at start  2/10 pain at end of session    OBJECTIVE:  C/S AROM  rotation L:55//60 R 50 //60 deg.            Therapeutic Exercises (CPT 82211):     1. Upper Trap stretch, L, R 1x20-30    2. Levator scap stretch, L, R 1x20-30      Therapeutic Exercise Summary: Past HEP:   Chin tucks in supine - 3 x 5   Chin tucks against wall - 6 x 5   Scapular Squeezes - 1 x 10 seconds  Alicia against wall - 3 sets to fatigue         Therapeutic Treatments and Modalities: , MHP C/S pre MT x5'    2. Manual Therapy (CPT 43642), IASTM to bilat.(R>L) UT, L.Scapulae, Trp release to L. scapulae near insertion    3. Manual Therapy (CPT 48605), Manuel tape to bilat C-T/s PSM 50% TOT    4. Self Care ADL Training (CPT 77491), postural awareness while at work; HEP    Therapeutic Treatment and Modalities Summary: HOME EXERCISE PROGRAM [[id number]]    Upper Trap Stretch- No Over Pressure -  Hold 20 Seconds, Complete 2 Sets, Perform 2 Times a Day    LEVATOR SCAPULAE STRETCH - GRASPING WRIST -  Repeat 1 Time, Hold 20 Seconds, Complete 1 Set, Perform 2 Times a Day  Time-based treatments/modalities:    Physical Therapy Timed Treatment Charges  Functional training, self care minutes (CPT 36550): 12 minutes  Manual therapy minutes (CPT 72784): 18 minutes  Therapeutic exercise minutes (CPT 25466): 10 minutes  Pain rating (1-10) after treatment:   2    ASSESSMENT:   Response to treatment: Trp spot in bilat. L scap. , which dissipated post MT.  Reapplied tape. Pt. Returns correct demo of hep to carry out at home.  PLAN/RECOMMENDATIONS:   Plan for treatment: therapy treatment to continue next visit.  Planned interventions for next visit: continue with current treatment.

## 2022-09-26 ENCOUNTER — APPOINTMENT (OUTPATIENT)
Dept: PHYSICAL THERAPY | Facility: REHABILITATION | Age: 27
End: 2022-09-26
Attending: FAMILY MEDICINE
Payer: COMMERCIAL

## 2022-10-03 ENCOUNTER — APPOINTMENT (OUTPATIENT)
Dept: PHYSICAL THERAPY | Facility: REHABILITATION | Age: 27
End: 2022-10-03
Attending: FAMILY MEDICINE
Payer: COMMERCIAL

## 2022-10-07 ENCOUNTER — PHYSICAL THERAPY (OUTPATIENT)
Dept: PHYSICAL THERAPY | Facility: REHABILITATION | Age: 27
End: 2022-10-07
Attending: FAMILY MEDICINE
Payer: COMMERCIAL

## 2022-10-07 DIAGNOSIS — M54.2 NECK PAIN: ICD-10-CM

## 2022-10-07 PROCEDURE — 97535 SELF CARE MNGMENT TRAINING: CPT

## 2022-10-07 PROCEDURE — 97124 MASSAGE THERAPY: CPT

## 2022-10-07 NOTE — OP THERAPY DAILY TREATMENT
Outpatient Physical Therapy  DAILY TREATMENT     [facility] Physical [address].  [address]  [place] [address]  Phone:  [phone]  Fax:  [phone]    Date: 10/07/2022    Patient: Isa Mcclure  YOB: 1995  MRN: [id number]     Time Calculation    Start time: 0815  Stop time: 0900 Time Calculation (min): 45 minutes       Chief Complaint: Neck Pain    Visit #: 5    SUBJECTIVE:  Pt. reports upon waking up she got L sided pain radiating down the L post scapula which hasn't gone away since. She also feels tension in the L lateral neck mm.  Pain  6-7/10 pain at start  3-4/10 pain at end of session    OBJECTIVE:  C/S AROM  rotation L:/45*/55 R  55//60 deg.    L UE elevation to 135**//145      *neck  **scapula    Therapeutic Exercises (CPT 32837):     1. Upper Trap stretch, L  1x20-30, manual    2. Levator scap stretch, L 1x20-30, manual      Therapeutic Exercise Summary: Past HEP:   Chin tucks in supine - 3 x 5   Chin tucks against wall - 6 x 5   Scapular Squeezes - 1 x 10 seconds  Alicia against wall - 3 sets to fatigue         Therapeutic Treatments and Modalities: , MHP C/S&T/S pre MT x8'    2. Manual Therapy (CPT 61800), Supine STM/Acp to L scalene, L QL-hoxblyks-cxcf, L L. scap -prox. IASTM to L UT, L.Scapulae, Rhomboid minor 1/2 way- insertion    3. Manual Therapy (CPT 52378), Manuel tape to bilat C-T/s PSM 50% TOT    4. Self Care ADL Training (CPT 67730), use heating pad 20' doses    Therapeutic Treatment and Modalities Summary: HOME EXERCISE PROGRAM [[id number]]    Upper Trap Stretch- No Over Pressure -  Hold 20 Seconds, Complete 2 Sets, Perform 2 Times a Day    LEVATOR SCAPULAE STRETCH - GRASPING WRIST -  Repeat 1 Time, Hold 20 Seconds, Complete 1 Set, Perform 2 Times a Day  Time-based treatments/modalities:    Physical Therapy Timed Treatment Charges  Functional training, self care minutes (CPT 22244): 15 minutes  Massage therapy minutes (CPT 04578): 25 minutes  Pain  rating (1-10) after treatment:  2    ASSESSMENT:   Response to treatment: improved. Hpertension /Trps in the L neck, post MT the pain lessened.  PLAN/RECOMMENDATIONS:   Plan for treatment: therapy treatment to continue next visit.  Planned interventions for next visit: continue with current treatment.

## 2022-10-10 ENCOUNTER — PHYSICAL THERAPY (OUTPATIENT)
Dept: PHYSICAL THERAPY | Facility: REHABILITATION | Age: 27
End: 2022-10-10
Attending: FAMILY MEDICINE
Payer: COMMERCIAL

## 2022-10-10 DIAGNOSIS — M54.2 NECK PAIN: ICD-10-CM

## 2022-10-10 PROCEDURE — 97140 MANUAL THERAPY 1/> REGIONS: CPT

## 2022-10-10 PROCEDURE — 97110 THERAPEUTIC EXERCISES: CPT

## 2022-10-10 NOTE — OP THERAPY DAILY TREATMENT
"  Outpatient Physical Therapy  DAILY TREATMENT     [facility] Physical [address].  [address]  [place] [address]  Phone:  [phone]  Fax:  [phone]    Date: 10/10/2022    Patient: Isa Mcclure  YOB: 1995  MRN: [id number]     Time Calculation    Start time: 0949  Stop time: 1030 Time Calculation (min): 41 minutes       Chief Complaint: Neck Problem    Visit #: 6    SUBJECTIVE:  Pt. reports no pain at all after the last rx  just stiff now.  0/10 pain at start  0/10 pain at end of session    OBJECTIVE:  C/S AROM  rotation L: R 65 deg.   L UE elevation to 175  E: 55 deg. *// 75 deg     Therapeutic Exercises (CPT 11447):     1. Upper Trap stretch, L 1x 20-30\"    2. Levator scap stretch, L 1x20-30      Therapeutic Exercise Summary: HOME EXERCISE PROGRAM [[id number]]    COMMENT: Progression. If the exercises become easy to you, then try with 2# DB for added challenge.    Superman Level 1 -  Repeat 3 Times, Hold 10 Seconds, Perform 1 Times a Day    Superman Level 2 -  Repeat 10 Times, Hold 2 Seconds, Perform 1 Times a Day    Superman Rhomboid -  Repeat 10 Times, Hold 2 Seconds, Perform 1 Times a Day    Past HEP:   Chin tucks in supine - 3 x 5   Chin tucks against wall - 6 x 5   Scapular Squeezes - 1 x 10 seconds  Alicia against wall - 3 sets to fatigue         Therapeutic Treatments and Modalities: , MHP C/S&T/S pre MT -    2. Manual Therapy (CPT 32454), Supine paivms C4 PA g3 x 3 bouts, C5-T2 PA G4    3. Manual Therapy (CPT 55731), Manuel tape to bilat C-T/s PSM 50% TOT    4. Self Care ADL Training (CPT 02710), Hep instructions - see below for details, emailed    Therapeutic Treatment and Modalities Summary: HOME EXERCISE PROGRAM [[id number]]    Upper Trap Stretch- No Over Pressure -  Hold 20 Seconds, Complete 2 Sets, Perform 2 Times a Day    LEVATOR SCAPULAE STRETCH - GRASPING WRIST -  Repeat 1 Time, Hold 20 Seconds, Complete 1 Set, Perform 2 Times a Day  Time-based " treatments/modalities:    Physical Therapy Timed Treatment Charges  Manual therapy minutes (CPT 82157): 15 minutes  Therapeutic exercise minutes (CPT 58483): 25 minutes  Pain rating (1-10) after treatment:  2    ASSESSMENT:   Response to treatment: improved. Full, painfree C/S AROM, progressed TE w/out pain.   PLAN/RECOMMENDATIONS:   Plan for treatment: therapy treatment to continue next visit. Mt C4? Vs tape/TE  Planned interventions for next visit: continue with current treatment. Anticipate 2 more f/us visits

## 2022-10-14 DIAGNOSIS — F98.8 ATTENTION DEFICIT DISORDER (ADD) WITHOUT HYPERACTIVITY: ICD-10-CM

## 2022-10-14 RX ORDER — DEXTROAMPHETAMINE SACCHARATE, AMPHETAMINE ASPARTATE, DEXTROAMPHETAMINE SULFATE AND AMPHETAMINE SULFATE 7.5; 7.5; 7.5; 7.5 MG/1; MG/1; MG/1; MG/1
30 TABLET ORAL 2 TIMES DAILY
Qty: 56 TABLET | Refills: 0 | Status: SHIPPED | OUTPATIENT
Start: 2022-10-14 | End: 2022-10-19 | Stop reason: SDUPTHER

## 2022-10-17 ENCOUNTER — PHYSICAL THERAPY (OUTPATIENT)
Dept: PHYSICAL THERAPY | Facility: REHABILITATION | Age: 27
End: 2022-10-17
Attending: FAMILY MEDICINE
Payer: COMMERCIAL

## 2022-10-17 DIAGNOSIS — M54.2 NECK PAIN: ICD-10-CM

## 2022-10-17 PROCEDURE — 97124 MASSAGE THERAPY: CPT

## 2022-10-17 PROCEDURE — 97110 THERAPEUTIC EXERCISES: CPT

## 2022-10-17 NOTE — OP THERAPY DAILY TREATMENT
"  Outpatient Physical Therapy  DAILY TREATMENT     [facility] Physical [address]  [address]  [place] [address]  Phone:  [phone]  Fax:  [phone]    Date: 10/17/2022    Patient: Isa Mcclure  YOB: 1995  MRN: [id number]     Time Calculation    Start time: 0945  Stop time: 1015 Time Calculation (min): 30 minutes         Chief Complaint: Neck Problem    Visit #: 7    SUBJECTIVE:  Pt. Got back from a road trip and she was thinking it would flair but, she is just S+ in neck     Pain rating (1-10) before treatment:  1  Pain rating (1-10) after treatment:  1  Pain Location:  Suboccipital to upper traps, R>L, can be both sides, but usually one side or the other.  Description: S+    OBJECTIVE:  Current objective measures:         Therapeutic Exercises (CPT 38586):     1. Upper Trap stretch, L 1x 20-30\"    2. Levator scap stretch, L 1x20-30    3. prone E, 1x10, sb    4. prone T, 1x10, sb, TC/VC for no shrug    5. prone T, thumbs down, 1x10, sb TC/VC for no shrug      Therapeutic Exercise Summary: HOME EXERCISE PROGRAM [[id number]]    COMMENT: Progression. If the exercises become easy to you, then try with 2# DB for added challenge.    Superman Level 1 -  Repeat 3 Times, Hold 10 Seconds, Perform 1 Times a Day    Superman Level 2 -  Repeat 10 Times, Hold 2 Seconds, Perform 1 Times a Day    Superman Rhomboid -  Repeat 10 Times, Hold 2 Seconds, Perform 1 Times a Day    Past HEP:   Chin tucks in supine - 3 x 5   Chin tucks against wall - 6 x 5   Scapular Squeezes - 1 x 10 seconds  Alicia against wall - 3 sets to fatigue         Therapeutic Treatments and Modalities:     2. Manual Therapy (CPT 88649), prone STM/MFR to bilat. UT, .L scap., Rhomboids    3. Manual Therapy (CPT 22299), Manuel tape to -    4. Self Care ADL Training (CPT 75508), -    Therapeutic Treatment and Modalities Summary: HOME EXERCISE PROGRAM [[id number]]    Upper Trap Stretch- No Over Pressure -  Hold 20 Seconds, " Complete 2 Sets, Perform 2 Times a Day    LEVATOR SCAPULAE STRETCH - GRASPING WRIST -  Repeat 1 Time, Hold 20 Seconds, Complete 1 Set, Perform 2 Times a Day  Time-based treatments/modalities:       ASSESSMENT:   Response to treatment: well with reduced, symptoms and no pain    PLAN/RECOMMENDATIONS:   Plan for treatment: therapy treatment to continue next visit.  Planned interventions for next visit: continue with current treatment.

## 2022-10-19 DIAGNOSIS — F98.8 ATTENTION DEFICIT DISORDER (ADD) WITHOUT HYPERACTIVITY: ICD-10-CM

## 2022-10-19 RX ORDER — DEXTROAMPHETAMINE SACCHARATE, AMPHETAMINE ASPARTATE, DEXTROAMPHETAMINE SULFATE AND AMPHETAMINE SULFATE 7.5; 7.5; 7.5; 7.5 MG/1; MG/1; MG/1; MG/1
30 TABLET ORAL 2 TIMES DAILY
Qty: 56 TABLET | Refills: 0 | Status: SHIPPED | OUTPATIENT
Start: 2022-10-19 | End: 2022-11-28 | Stop reason: SDUPTHER

## 2022-10-20 ENCOUNTER — OFFICE VISIT (OUTPATIENT)
Dept: MEDICAL GROUP | Facility: LAB | Age: 27
End: 2022-10-20
Payer: COMMERCIAL

## 2022-10-20 VITALS
DIASTOLIC BLOOD PRESSURE: 60 MMHG | TEMPERATURE: 97.9 F | WEIGHT: 123 LBS | BODY MASS INDEX: 24.8 KG/M2 | SYSTOLIC BLOOD PRESSURE: 100 MMHG | HEIGHT: 59 IN | HEART RATE: 99 BPM | OXYGEN SATURATION: 100 % | RESPIRATION RATE: 12 BRPM

## 2022-10-20 DIAGNOSIS — F43.10 POSTTRAUMATIC STRESS DISORDER: ICD-10-CM

## 2022-10-20 DIAGNOSIS — E55.9 VITAMIN D DEFICIENCY: ICD-10-CM

## 2022-10-20 DIAGNOSIS — J06.9 UPPER RESPIRATORY TRACT INFECTION, UNSPECIFIED TYPE: ICD-10-CM

## 2022-10-20 LAB
EXTERNAL QUALITY CONTROL: NORMAL
FLUAV+FLUBV AG SPEC QL IA: NEGATIVE
INT CON NEG: NORMAL
INT CON NEG: NORMAL
INT CON POS: NORMAL
INT CON POS: NORMAL
SARS-COV+SARS-COV-2 AG RESP QL IA.RAPID: NEGATIVE

## 2022-10-20 PROCEDURE — 87426 SARSCOV CORONAVIRUS AG IA: CPT | Performed by: INTERNAL MEDICINE

## 2022-10-20 PROCEDURE — 87804 INFLUENZA ASSAY W/OPTIC: CPT | Performed by: INTERNAL MEDICINE

## 2022-10-20 PROCEDURE — 99214 OFFICE O/P EST MOD 30 MIN: CPT | Performed by: INTERNAL MEDICINE

## 2022-10-20 ASSESSMENT — FIBROSIS 4 INDEX: FIB4 SCORE: 0.56
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
## 2022-10-20 NOTE — LETTER
October 20, 2022        Patient: Isa Mcclure   YOB: 1995   Date of Visit: 10/20/2022           To Whom It May Concern:    It is my opinion that Isa Mcclure needs to be off work October 18, 2022 through October 21, 2022, due to a medical illness.      If you have any questions or concerns, please don't hesitate to call.        Sincerely,          Nicolas Wood D.O.  Board Certified General Internal Medicine.      [address] [id number] [phone] (Phone)  [phone] (Fax)

## 2022-11-23 DIAGNOSIS — F51.01 PRIMARY INSOMNIA: ICD-10-CM

## 2022-11-23 RX ORDER — CLONAZEPAM 1 MG/1
1 TABLET ORAL
Qty: 30 TABLET | Refills: 0 | Status: SHIPPED | OUTPATIENT
Start: 2022-11-23 | End: 2023-02-24 | Stop reason: SDUPTHER

## 2022-11-23 NOTE — TELEPHONE ENCOUNTER
Received request via: Patient  10/20/22  Was the patient seen in the last year in this department? Yes    Does the patient have an active prescription (recently filled or refills available) for medication(s) requested? No    Does the patient have group home Plus and need 100 day supply (blood pressure, diabetes and cholesterol meds only)?

## 2022-11-28 DIAGNOSIS — F98.8 ATTENTION DEFICIT DISORDER (ADD) WITHOUT HYPERACTIVITY: ICD-10-CM

## 2022-11-28 RX ORDER — DEXTROAMPHETAMINE SACCHARATE, AMPHETAMINE ASPARTATE, DEXTROAMPHETAMINE SULFATE AND AMPHETAMINE SULFATE 7.5; 7.5; 7.5; 7.5 MG/1; MG/1; MG/1; MG/1
30 TABLET ORAL 2 TIMES DAILY
Qty: 56 TABLET | Refills: 0 | Status: SHIPPED | OUTPATIENT
Start: 2022-11-28 | End: 2022-12-21 | Stop reason: SDUPTHER

## 2022-11-28 NOTE — TELEPHONE ENCOUNTER
Received request via: Pharmacy  8/1/2022lov  Was the patient seen in the last year in this department? Yes    Does the patient have an active prescription (recently filled or refills available) for medication(s) requested? No    Does the patient have [facility] Plus and need 100 day supply (blood pressure, diabetes and cholesterol meds only)? Patient does not have SCP

## 2022-12-12 ENCOUNTER — OFFICE VISIT (OUTPATIENT)
Dept: URGENT CARE | Facility: CLINIC | Age: 27
End: 2022-12-12
Payer: COMMERCIAL

## 2022-12-12 VITALS
RESPIRATION RATE: 20 BRPM | DIASTOLIC BLOOD PRESSURE: 72 MMHG | WEIGHT: 116 LBS | OXYGEN SATURATION: 100 % | HEART RATE: 107 BPM | SYSTOLIC BLOOD PRESSURE: 126 MMHG | HEIGHT: 59 IN | TEMPERATURE: 98.7 F | BODY MASS INDEX: 23.39 KG/M2

## 2022-12-12 DIAGNOSIS — B34.9 ACUTE VIRAL SYNDROME: ICD-10-CM

## 2022-12-12 DIAGNOSIS — R05.1 ACUTE COUGH: ICD-10-CM

## 2022-12-12 PROCEDURE — 87426 SARSCOV CORONAVIRUS AG IA: CPT | Performed by: EMERGENCY MEDICINE

## 2022-12-12 PROCEDURE — 99213 OFFICE O/P EST LOW 20 MIN: CPT | Performed by: EMERGENCY MEDICINE

## 2022-12-12 PROCEDURE — 87804 INFLUENZA ASSAY W/OPTIC: CPT | Performed by: EMERGENCY MEDICINE

## 2022-12-12 RX ORDER — BENZONATATE 100 MG/1
100 CAPSULE ORAL 3 TIMES DAILY PRN
Qty: 30 CAPSULE | Refills: 0 | Status: SHIPPED | OUTPATIENT
Start: 2022-12-12 | End: 2023-02-23

## 2022-12-12 ASSESSMENT — ENCOUNTER SYMPTOMS
FEVER: 1
SHORTNESS OF BREATH: 1
COUGH: 1

## 2022-12-12 ASSESSMENT — FIBROSIS 4 INDEX: FIB4 SCORE: 0.56

## 2022-12-12 NOTE — PATIENT INSTRUCTIONS
Tylenol or ibuprofen as directed for pain or fever., Throat lozenges with benzocaine or salt water gargles may help with throat pain., Use honey for cough, it has been shown to be better than even over the counter medications for cough severity and frequency., Mucinex as directed to loosen up the phlegm, Drink plenty of fluids. , and Followup with your doctor if not improved, return if shortness of breath, vomiting, unable to swallow, worse.

## 2022-12-12 NOTE — PROGRESS NOTES
"  Subjective:     Isa Mcclure  is a 27 y.o. female who presents for Shortness of Breath (X2-3 days, has asthma ) and Cough (Chest congested )       Patient is a 27-year-old female who complains of shortness of breath, cough for 2 to 3 days, fever up to 102.  She denies any nausea or vomiting or other GI symptoms.  She states she does have some throat discomfort.  She states she thought initially the shortness of breath was due to her asthma, but is not improved with her inhaler.  She has lots of exposure to illness, she works in the pediatric clinic.  Past medical history significant for asthma, but no hospitalizations since childhood, only requires steroids about once a year.  She is vaccinated for COVID.   Review of Systems   Constitutional:  Positive for fever and malaise/fatigue.   Respiratory:  Positive for cough and shortness of breath.    All other systems reviewed and are negative.   Allergies   Allergen Reactions   • Bactrim Ds Hives and Unspecified     hives   • Hydrocodone Itching and Nausea     Past Medical History:   Diagnosis Date   • Allergy    • Anxiety     Used Klonopin   • Asthma    • Depression    • Drug abuse, IV (HCC)    • Migraine    • Substance abuse ([facility])     Methamphetamine and MJ - clean 4 1/2 years   • Urinary tract infection         Objective:   /72   Pulse (!) 107   Temp 37.1 °C (98.7 °F) (Temporal)   Resp 20   Ht 1.499 m (4' 11\")   Wt 52.6 kg (116 lb)   SpO2 100%   BMI 23.43 kg/m²   Physical Exam  Vitals and nursing note reviewed.   Constitutional:       General: She is not in acute distress.     Appearance: Normal appearance. She is ill-appearing. She is not toxic-appearing.      Comments: Petite female   HENT:      Head: Normocephalic and atraumatic.      Right Ear: Tympanic membrane, ear canal and external ear normal. There is no impacted cerumen.      Left Ear: Tympanic membrane, ear canal and external ear normal. There is no impacted cerumen.      Nose: " Congestion present. No rhinorrhea.      Mouth/Throat:      Mouth: Mucous membranes are moist.      Pharynx: No oropharyngeal exudate or posterior oropharyngeal erythema.   Eyes:      General: No scleral icterus.        Right eye: No discharge.         Left eye: No discharge.   Cardiovascular:      Rate and Rhythm: Normal rate and regular rhythm.      Heart sounds: Normal heart sounds. No murmur heard.  Pulmonary:      Effort: Pulmonary effort is normal. No respiratory distress.      Breath sounds: Normal breath sounds. No wheezing or rhonchi.   Abdominal:      General: There is no distension.      Palpations: Abdomen is soft.      Tenderness: There is no abdominal tenderness.   Musculoskeletal:      Cervical back: Normal range of motion and neck supple. No tenderness.      Right lower leg: No edema.      Left lower leg: No edema.   Lymphadenopathy:      Cervical: No cervical adenopathy.   Skin:     General: Skin is warm and dry.      Coloration: Skin is not jaundiced.      Findings: No rash.   Neurological:      General: No focal deficit present.      Mental Status: She is alert and oriented to person, place, and time.   Psychiatric:         Mood and Affect: Mood normal.         Behavior: Behavior normal.         Thought Content: Thought content normal.         Assessment/Plan:     Encounter Diagnoses   Name Primary?   • Acute viral syndrome        Patient with viral symptoms without any concerning features, oxygen saturation 100% on room air, afebrile here without antipyretics.  Mildly tachycardic at intake, but not at the time of my exam.  Otherwise benign exam.  Will test for flu and COVID, otherwise we will offer supportive/symptomatic treatment.    Assessment    1. Acute viral syndrome  - POCT Influenza A/B  - POCT SARS-COV Antigen DUSTIN (Symptomatic only)    POCT flu negative  POCT covid negative    See AVS Instructions below for written guidance provided to patient on after-visit management and care in addition  to our verbal discussion during the visit.    Please note that this dictation was created using voice recognition software. I have attempted to correct all errors, but there may be sound-alike, spelling, grammar and possibly content errors that I did not discover before finalizing the note.

## 2022-12-12 NOTE — LETTER
[facility]  [address]  [place] [address]     December 12, 2022    Patient: Isa Mcclure   YOB: 1995   Date of Visit: 12/12/2022       To Whom It May Concern:    Isa Mcclure was seen and treated in our department on 12/12/2022. Please excuse the patient from work for the following dates: Dec 12-13  She may return without restrictions on: Dec 14 If fever free for at least 24 hours without medications.         Sincerely,     Mariposa Patterson M.D.

## 2022-12-21 DIAGNOSIS — F98.8 ATTENTION DEFICIT DISORDER (ADD) WITHOUT HYPERACTIVITY: ICD-10-CM

## 2022-12-21 RX ORDER — DEXTROAMPHETAMINE SACCHARATE, AMPHETAMINE ASPARTATE, DEXTROAMPHETAMINE SULFATE AND AMPHETAMINE SULFATE 7.5; 7.5; 7.5; 7.5 MG/1; MG/1; MG/1; MG/1
30 TABLET ORAL 2 TIMES DAILY
Qty: 56 TABLET | Refills: 0 | Status: SHIPPED | OUTPATIENT
Start: 2022-12-21 | End: 2022-12-22 | Stop reason: SDUPTHER

## 2022-12-21 NOTE — TELEPHONE ENCOUNTER
Received request via: Patient    Was the patient seen in the last year in this department? Yes    Does the patient have an active prescription (recently filled or refills available) for medication(s) requested? No    Does the patient have retirement Plus and need 100 day supply (blood pressure, diabetes and cholesterol meds only)? Patient does not have SCP

## 2022-12-22 DIAGNOSIS — F98.8 ATTENTION DEFICIT DISORDER (ADD) WITHOUT HYPERACTIVITY: ICD-10-CM

## 2022-12-22 RX ORDER — DEXTROAMPHETAMINE SACCHARATE, AMPHETAMINE ASPARTATE, DEXTROAMPHETAMINE SULFATE AND AMPHETAMINE SULFATE 7.5; 7.5; 7.5; 7.5 MG/1; MG/1; MG/1; MG/1
30 TABLET ORAL 2 TIMES DAILY
Qty: 56 TABLET | Refills: 0 | Status: SHIPPED | OUTPATIENT
Start: 2022-12-22 | End: 2022-12-27 | Stop reason: SDUPTHER

## 2022-12-27 DIAGNOSIS — F98.8 ATTENTION DEFICIT DISORDER (ADD) WITHOUT HYPERACTIVITY: ICD-10-CM

## 2022-12-27 RX ORDER — DEXTROAMPHETAMINE SACCHARATE, AMPHETAMINE ASPARTATE, DEXTROAMPHETAMINE SULFATE AND AMPHETAMINE SULFATE 7.5; 7.5; 7.5; 7.5 MG/1; MG/1; MG/1; MG/1
30 TABLET ORAL 2 TIMES DAILY
Qty: 56 TABLET | Refills: 0 | Status: SHIPPED | OUTPATIENT
Start: 2022-12-27 | End: 2023-02-24 | Stop reason: SDUPTHER

## 2023-01-26 PROCEDURE — RXMED WILLOW AMBULATORY MEDICATION CHARGE: Performed by: NURSE PRACTITIONER

## 2023-01-27 ENCOUNTER — PHARMACY VISIT (OUTPATIENT)
Dept: PHARMACY | Facility: MEDICAL CENTER | Age: 28
End: 2023-01-27
Payer: COMMERCIAL

## 2023-02-01 ENCOUNTER — GYNECOLOGY VISIT (OUTPATIENT)
Dept: OBGYN | Facility: CLINIC | Age: 28
End: 2023-02-01
Payer: COMMERCIAL

## 2023-02-01 ENCOUNTER — HOSPITAL ENCOUNTER (OUTPATIENT)
Facility: MEDICAL CENTER | Age: 28
End: 2023-02-01
Attending: STUDENT IN AN ORGANIZED HEALTH CARE EDUCATION/TRAINING PROGRAM
Payer: COMMERCIAL

## 2023-02-01 VITALS — WEIGHT: 120 LBS | BODY MASS INDEX: 24.24 KG/M2

## 2023-02-01 DIAGNOSIS — N76.0 ACUTE VAGINITIS: ICD-10-CM

## 2023-02-01 DIAGNOSIS — Z12.4 SCREENING FOR MALIGNANT NEOPLASM OF CERVIX: ICD-10-CM

## 2023-02-01 DIAGNOSIS — Z30.432 ENCOUNTER FOR IUD REMOVAL: ICD-10-CM

## 2023-02-01 LAB
CANDIDA DNA VAG QL PROBE+SIG AMP: NEGATIVE
CYTOLOGY REG CYTOL: NORMAL
G VAGINALIS DNA VAG QL PROBE+SIG AMP: POSITIVE
T VAGINALIS DNA VAG QL PROBE+SIG AMP: NEGATIVE

## 2023-02-01 PROCEDURE — RXMED WILLOW AMBULATORY MEDICATION CHARGE: Performed by: STUDENT IN AN ORGANIZED HEALTH CARE EDUCATION/TRAINING PROGRAM

## 2023-02-01 PROCEDURE — 99213 OFFICE O/P EST LOW 20 MIN: CPT | Mod: 25 | Performed by: STUDENT IN AN ORGANIZED HEALTH CARE EDUCATION/TRAINING PROGRAM

## 2023-02-01 PROCEDURE — 87510 GARDNER VAG DNA DIR PROBE: CPT

## 2023-02-01 PROCEDURE — 88175 CYTOPATH C/V AUTO FLUID REDO: CPT

## 2023-02-01 PROCEDURE — 58301 REMOVE INTRAUTERINE DEVICE: CPT | Performed by: STUDENT IN AN ORGANIZED HEALTH CARE EDUCATION/TRAINING PROGRAM

## 2023-02-01 PROCEDURE — 87660 TRICHOMONAS VAGIN DIR PROBE: CPT

## 2023-02-01 PROCEDURE — 87480 CANDIDA DNA DIR PROBE: CPT

## 2023-02-01 RX ORDER — NORETHINDRONE ACETATE AND ETHINYL ESTRADIOL 1MG-20(21)
1 KIT ORAL DAILY
Qty: 28 TABLET | Refills: 11 | Status: SHIPPED | OUTPATIENT
Start: 2023-02-01 | End: 2023-06-23

## 2023-02-01 ASSESSMENT — FIBROSIS 4 INDEX: FIB4 SCORE: 0.56

## 2023-02-01 NOTE — PROGRESS NOTES
GYN PROBLEM VISIT    CC: BV and IUD removal       HPI: Patient is a 27 y.o.  who complains of frequent BV infections with Mirena IUD in place.  Has been in place since  and reports BV and/or foul smelling discharge as frequently as every other week. Desires IUD removal today.  Also reports worsening cramping, facial acne, and hair growth since placement.  Has been on OCPs in the past and would like to restart those.     She is also due to routine pap today.     ROS:   General: denies fever / chills  HEENT: denies sore throat:  CV: denies chest pain:  Repiratory: denies shortness of breath  GI: denies abdominal pain  : denies dysuria    PFSH:  I personally reviewed the past medical and surgical histories.     Social History     Tobacco Use    Smoking status: Former     Types: Cigarettes    Smokeless tobacco: Never   Vaping Use    Vaping Use: Never used   Substance Use Topics    Alcohol use: Not Currently    Drug use: Not Currently     Types: Marijuana     Comment: hx of meth and cannabis use, sober since        Social History     Substance and Sexual Activity   Sexual Activity Yes    Partners: Male    Birth control/protection: I.U.D.    Comment: none        ALLERGIES / REACTIONS:  Allergies   Allergen Reactions    Bactrim Ds Hives and Unspecified     hives    Hydrocodone Itching and Nausea                           PHYSICAL EXAMINATION:  Vital Signs:   Vitals:    23 0854   Weight: 120 lb     Body mass index is 24.24 kg/m².    Gen: appears well, NAD  Respiratory: normal effort  Abdomen: Soft, non-tender.  Pelvic Exam:    Vulva: normal.    Urethra: normal.   Vagina: normal.    Cervix: normal, IUD strings visualized; pap collected   Uterus: normal size, shape and contour.    left adnexa: normal.   right adnexa: normal.   Perineum: normal.    Procedure: IUD removal  IUD strings visualized on speculum exam. Grasped with ring forceps and removed intact.    ASSESSMENT AND PLAN:  27 y.o.    1.  Acute vaginitis  -- Vaginal pathogens swab obtained today  -- Will treat if indicated    - VAGINAL PATHOGENS DNA PANEL; Future    2. Screening for malignant neoplasm of cervix  -- Pap due today - collected with exam  -- Follow-up pending results  - THINPREP PAP ONLY; Future    3. Encounter for IUD removal  -- IUD removed intact   -- Prescription sent for OCPs    Return: annually or PRN    Total encounter time: 25 minutes  Ernesto Fernandes DO, JENAEOG

## 2023-02-01 NOTE — PROGRESS NOTES
Pt here annual exam  Pt states thinks she has BV, and would like her IUD out and discuss other BC options  Good#[phone]  Pharmacy verified

## 2023-02-02 DIAGNOSIS — B96.89 BACTERIAL VAGINOSIS: ICD-10-CM

## 2023-02-02 DIAGNOSIS — N76.0 BACTERIAL VAGINOSIS: ICD-10-CM

## 2023-02-02 PROCEDURE — RXMED WILLOW AMBULATORY MEDICATION CHARGE: Performed by: STUDENT IN AN ORGANIZED HEALTH CARE EDUCATION/TRAINING PROGRAM

## 2023-02-02 RX ORDER — METRONIDAZOLE 500 MG/1
TABLET ORAL
Qty: 14 TABLET | Refills: 0 | Status: SHIPPED | OUTPATIENT
Start: 2023-02-02 | End: 2023-02-23

## 2023-02-02 NOTE — PROGRESS NOTES
Meds sent for BV to [facility] pharmacy per patient request.    JELANI Fernandes D.O.

## 2023-02-03 ENCOUNTER — TELEPHONE (OUTPATIENT)
Dept: OBGYN | Facility: CLINIC | Age: 28
End: 2023-02-03
Payer: COMMERCIAL

## 2023-02-03 NOTE — TELEPHONE ENCOUNTER
----- Message from Ernesto Fernandes D.O. sent at 2/2/2023  8:35 AM PST -----  Can you please call the patient and let her know that her swab came back + for BV? I've prescribed her medication to her pharmacy. Thank you!        Called pt and informed her test came back positive for BV, pt sttaed she has had it before. Pt informed she needs to start antibiotics. Should take the full Tx and advised to take meds with food but not with milk and to avoid alcohol and intercourse while on Tx. Pharmacy verified with pt. Pt agreed and verbalized understanding.

## 2023-02-04 ENCOUNTER — PHARMACY VISIT (OUTPATIENT)
Dept: PHARMACY | Facility: MEDICAL CENTER | Age: 28
End: 2023-02-04
Payer: COMMERCIAL

## 2023-02-23 ENCOUNTER — OFFICE VISIT (OUTPATIENT)
Dept: URGENT CARE | Facility: CLINIC | Age: 28
End: 2023-02-23
Payer: COMMERCIAL

## 2023-02-23 VITALS
BODY MASS INDEX: 26.26 KG/M2 | DIASTOLIC BLOOD PRESSURE: 82 MMHG | HEART RATE: 84 BPM | OXYGEN SATURATION: 99 % | SYSTOLIC BLOOD PRESSURE: 110 MMHG | RESPIRATION RATE: 16 BRPM | WEIGHT: 125.1 LBS | TEMPERATURE: 98.9 F | HEIGHT: 58 IN

## 2023-02-23 DIAGNOSIS — J02.9 PHARYNGITIS, UNSPECIFIED ETIOLOGY: ICD-10-CM

## 2023-02-23 DIAGNOSIS — R68.89 FLU-LIKE SYMPTOMS: ICD-10-CM

## 2023-02-23 LAB
FLUAV RNA SPEC QL NAA+PROBE: NEGATIVE
FLUBV RNA SPEC QL NAA+PROBE: NEGATIVE
RSV RNA SPEC QL NAA+PROBE: NEGATIVE
S PYO DNA SPEC NAA+PROBE: NOT DETECTED
SARS-COV-2 RNA RESP QL NAA+PROBE: NEGATIVE

## 2023-02-23 PROCEDURE — 87651 STREP A DNA AMP PROBE: CPT | Performed by: PHYSICIAN ASSISTANT

## 2023-02-23 PROCEDURE — 99213 OFFICE O/P EST LOW 20 MIN: CPT | Mod: CS | Performed by: PHYSICIAN ASSISTANT

## 2023-02-23 PROCEDURE — 0241U POCT CEPHEID COV-2, FLU A/B, RSV - PCR: CPT | Performed by: PHYSICIAN ASSISTANT

## 2023-02-23 RX ORDER — DEXAMETHASONE SODIUM PHOSPHATE 10 MG/ML
10 INJECTION INTRAMUSCULAR; INTRAVENOUS ONCE
Status: COMPLETED | OUTPATIENT
Start: 2023-02-23 | End: 2023-02-23

## 2023-02-23 RX ADMIN — DEXAMETHASONE SODIUM PHOSPHATE 10 MG: 10 INJECTION INTRAMUSCULAR; INTRAVENOUS at 12:42

## 2023-02-23 ASSESSMENT — ENCOUNTER SYMPTOMS
NAUSEA: 0
SINUS PAIN: 0
EYE PAIN: 0
HEADACHES: 0
EYE REDNESS: 0
COUGH: 0
DIAPHORESIS: 0
EYE DISCHARGE: 0
SHORTNESS OF BREATH: 0
VOMITING: 0
DIZZINESS: 0
CHILLS: 0
FEVER: 0
WHEEZING: 0
DIARRHEA: 0
CONSTIPATION: 0
ABDOMINAL PAIN: 0
SORE THROAT: 1

## 2023-02-23 ASSESSMENT — FIBROSIS 4 INDEX: FIB4 SCORE: 0.56

## 2023-02-23 NOTE — PROGRESS NOTES
"  Subjective:     KENNEY CISSE  is a 27 y.o. female who presents for Pharyngitis (Runny nose, congestion x 3 days )       She presents today with sore throat, sinus congestion and rhinorrhea x3 days.  It was noted that she has a close sick contact at home whom has had strep pharyngitis, she is requesting testing today.  She was recommended by work to also obtain a COVID test at this time.  Denies fever/chills/sweats, chest pain, shortness of breath, nausea/vomiting, abdominal pain, diarrhea.  Has not used any medications for symptoms     Review of Systems   Constitutional:  Negative for chills, diaphoresis, fever and malaise/fatigue.   HENT:  Positive for congestion and sore throat. Negative for ear discharge and sinus pain.    Eyes:  Negative for pain, discharge and redness.   Respiratory:  Negative for cough, shortness of breath and wheezing.    Cardiovascular:  Negative for chest pain.   Gastrointestinal:  Negative for abdominal pain, constipation, diarrhea, nausea and vomiting.   Genitourinary:  Negative for dysuria, frequency and urgency.   Neurological:  Negative for dizziness and headaches.    Allergies   Allergen Reactions    Bactrim Ds Hives and Unspecified     hives    Hydrocodone Itching and Nausea     Past Medical History:   Diagnosis Date    Allergy     Anxiety     Used Klonopin    Asthma     Depression     Drug abuse, IV (HCC)     Migraine     Substance abuse (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   Constitutional:       General: She is not in acute distress.     Appearance: Normal appearance. She is not ill-appearing, toxic-appearing or diaphoretic.   HENT:      Head: Normocephalic.      Right Ear: Tympanic membrane, ear canal and " external ear normal. There is no impacted cerumen.      Left Ear: Tympanic membrane, ear canal and external ear normal. There is no impacted cerumen.      Nose: Congestion present. No rhinorrhea.      Mouth/Throat:      Mouth: Mucous membranes are moist.      Pharynx: Posterior oropharyngeal erythema present. No oropharyngeal exudate.      Comments: No soft tissue swelling of the sublingual mucosa, no swelling of the soft or hard palate, no unilarteral oral pharynx swelling, no uvular deviation.  Eyes:      General:         Right eye: No discharge.         Left eye: No discharge.      Conjunctiva/sclera: Conjunctivae normal.   Cardiovascular:      Rate and Rhythm: Normal rate and regular rhythm.   Pulmonary:      Effort: Pulmonary effort is normal. No respiratory distress.      Breath sounds: Normal breath sounds. No stridor. No wheezing or rhonchi.   Musculoskeletal:      Cervical back: Neck supple.   Lymphadenopathy:      Cervical: No cervical adenopathy.   Neurological:      General: No focal deficit present.      Mental Status: She is alert and oriented to person, place, and time.   Psychiatric:         Mood and Affect: Mood normal.         Behavior: Behavior normal.         Thought Content: Thought content normal.         Judgment: Judgment normal.           Diagnostic testing:    Cephid COVID/Influenza/RSV -negative    Cephid Strep -negative    Assessment/Plan:     Encounter Diagnoses   Name Primary?    Pharyngitis, unspecified etiology     Flu-like symptoms           Plan for care for today's complaint includes 10 mg oral Decadron in office today for pharyngitis symptoms, Cephid COVID/influenza/RSV and strep test were obtained today, results of these test were negative.  [employer] message was sent to the patient with the results.  At this time symptoms likely viral in nature, no evidence to support antibiotic use at this time as there is a negative strep test today.  Continue to monitor symptoms and return to  urgent care or follow-up with primary care provider if symptoms remain ongoing.  Follow-up in the emergency department if symptoms become severe, ER precautions discussed in office today..    See AVS Instructions below for written guidance provided to patient on after-visit management and care in addition to our verbal discussion during the visit.    Please note that this dictation was created using voice recognition software. I have attempted to correct all errors, but there may be sound-alike, spelling, grammar and possibly content errors that I did not discover before finalizing the note.    McDonald Chapeldwayne Villalta PA-C

## 2023-02-24 ENCOUNTER — PATIENT MESSAGE (OUTPATIENT)
Dept: MEDICAL GROUP | Facility: LAB | Age: 28
End: 2023-02-24
Payer: COMMERCIAL

## 2023-02-24 DIAGNOSIS — F51.01 PRIMARY INSOMNIA: ICD-10-CM

## 2023-02-24 DIAGNOSIS — F98.8 ATTENTION DEFICIT DISORDER (ADD) WITHOUT HYPERACTIVITY: ICD-10-CM

## 2023-02-24 RX ORDER — CLONAZEPAM 1 MG/1
1 TABLET ORAL
Qty: 30 TABLET | Refills: 0 | Status: SHIPPED | OUTPATIENT
Start: 2023-02-24 | End: 2023-03-26

## 2023-02-24 RX ORDER — DEXTROAMPHETAMINE SACCHARATE, AMPHETAMINE ASPARTATE, DEXTROAMPHETAMINE SULFATE AND AMPHETAMINE SULFATE 7.5; 7.5; 7.5; 7.5 MG/1; MG/1; MG/1; MG/1
30 TABLET ORAL 2 TIMES DAILY
Qty: 56 TABLET | Refills: 0 | Status: SHIPPED | OUTPATIENT
Start: 2023-02-24 | End: 2023-02-27 | Stop reason: SDUPTHER

## 2023-02-24 NOTE — TELEPHONE ENCOUNTER
Received request via: Pharmacy  6/13/2022lov  Was the patient seen in the last year in this department? Yes    Does the patient have an active prescription (recently filled or refills available) for medication(s) requested? No    Does the patient have senior living Plus and need 100 day supply (blood pressure, diabetes and cholesterol meds only)? Patient does not have SCP

## 2023-02-27 ENCOUNTER — PATIENT MESSAGE (OUTPATIENT)
Dept: MEDICAL GROUP | Facility: LAB | Age: 28
End: 2023-02-27
Payer: COMMERCIAL

## 2023-02-27 DIAGNOSIS — F98.8 ATTENTION DEFICIT DISORDER (ADD) WITHOUT HYPERACTIVITY: ICD-10-CM

## 2023-02-27 RX ORDER — DEXTROAMPHETAMINE SACCHARATE, AMPHETAMINE ASPARTATE, DEXTROAMPHETAMINE SULFATE AND AMPHETAMINE SULFATE 7.5; 7.5; 7.5; 7.5 MG/1; MG/1; MG/1; MG/1
30 TABLET ORAL 2 TIMES DAILY
Qty: 56 TABLET | Refills: 0 | Status: CANCELLED | OUTPATIENT
Start: 2023-02-27 | End: 2023-03-27

## 2023-02-27 RX ORDER — DEXTROAMPHETAMINE SACCHARATE, AMPHETAMINE ASPARTATE, DEXTROAMPHETAMINE SULFATE AND AMPHETAMINE SULFATE 7.5; 7.5; 7.5; 7.5 MG/1; MG/1; MG/1; MG/1
30 TABLET ORAL 2 TIMES DAILY
Qty: 56 TABLET | Refills: 0 | Status: SHIPPED | OUTPATIENT
Start: 2023-02-27 | End: 2023-03-27

## 2023-03-01 ENCOUNTER — OFFICE VISIT (OUTPATIENT)
Dept: MEDICAL GROUP | Facility: LAB | Age: 28
End: 2023-03-01
Payer: COMMERCIAL

## 2023-03-01 VITALS
RESPIRATION RATE: 12 BRPM | BODY MASS INDEX: 26.66 KG/M2 | TEMPERATURE: 97.7 F | HEIGHT: 58 IN | OXYGEN SATURATION: 95 % | SYSTOLIC BLOOD PRESSURE: 142 MMHG | HEART RATE: 102 BPM | WEIGHT: 127 LBS | DIASTOLIC BLOOD PRESSURE: 90 MMHG

## 2023-03-01 DIAGNOSIS — J03.90 TONSILLITIS: ICD-10-CM

## 2023-03-01 PROCEDURE — 99213 OFFICE O/P EST LOW 20 MIN: CPT | Performed by: INTERNAL MEDICINE

## 2023-03-01 RX ORDER — AMOXICILLIN 500 MG/1
500 CAPSULE ORAL 2 TIMES DAILY
Qty: 20 CAPSULE | Refills: 0 | Status: SHIPPED | OUTPATIENT
Start: 2023-03-01 | End: 2023-06-23

## 2023-03-01 ASSESSMENT — FIBROSIS 4 INDEX: FIB4 SCORE: 0.56

## 2023-03-01 NOTE — PROCEDURES
CC: KENNEY CISSE is a 27 y.o. female is suffering from   Chief Complaint   Patient presents with    Flu Like Symptoms     X 10 days, negative testing through urgent care         SUBJECTIVE:  1. Tonsillitis  Patient is here with a 10-day history of sore throat fatigue elevated temperature.  We have discussed the possibility that she may be suffering from tonsillitis.  Patient denies any allergies to amoxicillin, patient is on birth control discussed with her that amoxicillin can possibly cause problems with her birth control.        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[facility]  Social History     Tobacco Use    Smoking status: Former     Types: Cigarettes    Smokeless tobacco: Never   Vaping Use    Vaping Use: Every day   Substance Use Topics    Alcohol use: Not Currently    Drug use: Not Currently     Types: Marijuana     Comment: hx of meth and cannabis use, sober since 2015         MEDICATIONS:    Current Outpatient Medications:     amoxicillin (AMOXIL) 500 MG Cap, Take 1 Capsule by mouth 2 times a day., Disp: 20 Capsule, Rfl: 0    amphetamine-dextroamphetamine (ADDERALL) 30 MG tablet, Take 1 Tablet by mouth 2 times a day for 28 days., Disp: 56 Tablet, Rfl: 0    clonazePAM (KLONOPIN) 1 MG Tab, Take 1 Tablet by mouth at bedtime for 30 days., Disp: 30 Tablet, Rfl: 0    norethindrone-ethinyl estradiol (LOESTRIN FE 1/20) 1-20 MG-MCG per tablet, Take 1 Tablet by mouth every day., Disp: 28 Tablet, Rfl: 11    albuterol 108 (90 Base) MCG/ACT Aero Soln inhalation aerosol, Inhale 2 Puffs by mouth every 6 hours as needed for Shortness of Breath., Disp: 8.5 g, Rfl: 3    PROBLEMS:  Patient Active Problem List    Diagnosis Date Noted    Insomnia 04/05/2022    Mild persistent asthma without complication 10/11/2021    Painful menstruation 08/14/2020    Heavy menses 07/30/2020    Amenorrhea 08/27/2019    Eczema 04/17/2019    Obesity (BMI 30-39.9) 03/27/2018    Severe episode of recurrent major  "depressive disorder ([facility]) 01/13/2017    PTSD (post-traumatic stress disorder) 01/13/2017       REVIEW OF SYSTEMS:  Gen.:  No Nausea, Vomiting, fever, Chills.  Heart: No chest pain.  Lungs:  No shortness of Breath.  Psychological: Huber unusual Anxiety depression     PHYSICAL EXAM   Constitutional: Alert, cooperative, not in acute distress.  Cardiovascular:  Rate Rhythm is regular without murmurs rubs clicks.     Thorax & Lungs: Clear to auscultation, no wheezing, rhonchi, or rales  HENT: Normocephalic, Atraumatic.  Swollen tonsils  Eyes: PERRLA, EOMI, Conjunctiva normal.   Neck: Trachia is midline pain to palpation upper pharynx.   Lymphatic: No lymphadenopathy noted.   Neurologic: Alert & oriented x 3, cranial nerves II through XII are intact, Normal motor function, Normal sensory function, No focal deficits noted.   Psychiatric: Affect normal, Judgment normal, Mood normal.     VITAL SIGNS:BP (!) 142/90   Pulse (!) 102   Temp 36.5 °C (97.7 °F) (Temporal)   Resp 12   Ht 1.473 m (4' 10\")   Wt 57.6 kg (127 lb)   SpO2 95%   BMI 26.54 kg/m²     Labs: Reviewed    Assessment:                                                     Plan:    1. Tonsillitis  Likely tonsillitis start amoxicillin 500 mg 2 times a day for 10 days  - amoxicillin (AMOXIL) 500 MG Cap; Take 1 Capsule by mouth 2 times a day.  Dispense: 20 Capsule; Refill: 0        "

## 2023-03-01 NOTE — PROGRESS NOTES
CC: KENNEY CISSE is a 27 y.o. female is suffering from   Chief Complaint   Patient presents with    Flu Like Symptoms     X 10 days, negative testing through urgent care         SUBJECTIVE:  1. Tonsillitis  Patient is here with a 10-day history of sore throat fatigue elevated temperature.  We have discussed the possibility that she may be suffering from tonsillitis.  Patient denies any allergies to amoxicillin, patient is on birth control discussed with her that amoxicillin can possibly cause problems with her birth control.        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[employer]  Social History     Tobacco Use    Smoking status: Former     Types: Cigarettes    Smokeless tobacco: Never   Vaping Use    Vaping Use: Every day   Substance Use Topics    Alcohol use: Not Currently    Drug use: Not Currently     Types: Marijuana     Comment: hx of meth and cannabis use, sober since 2015         MEDICATIONS:    Current Outpatient Medications:     amoxicillin (AMOXIL) 500 MG Cap, Take 1 Capsule by mouth 2 times a day., Disp: 20 Capsule, Rfl: 0    amphetamine-dextroamphetamine (ADDERALL) 30 MG tablet, Take 1 Tablet by mouth 2 times a day for 28 days., Disp: 56 Tablet, Rfl: 0    clonazePAM (KLONOPIN) 1 MG Tab, Take 1 Tablet by mouth at bedtime for 30 days., Disp: 30 Tablet, Rfl: 0    norethindrone-ethinyl estradiol (LOESTRIN FE 1/20) 1-20 MG-MCG per tablet, Take 1 Tablet by mouth every day., Disp: 28 Tablet, Rfl: 11    albuterol 108 (90 Base) MCG/ACT Aero Soln inhalation aerosol, Inhale 2 Puffs by mouth every 6 hours as needed for Shortness of Breath., Disp: 8.5 g, Rfl: 3    PROBLEMS:  Patient Active Problem List    Diagnosis Date Noted    Insomnia 04/05/2022    Mild persistent asthma without complication 10/11/2021    Painful menstruation 08/14/2020    Heavy menses 07/30/2020    Amenorrhea 08/27/2019    Eczema 04/17/2019    Obesity (BMI 30-39.9) 03/27/2018    Severe episode of recurrent major  "depressive disorder ([facility]) 01/13/2017    PTSD (post-traumatic stress disorder) 01/13/2017       REVIEW OF SYSTEMS:  Gen.:  No Nausea, Vomiting, fever, Chills.  Heart: No chest pain.  Lungs:  No shortness of Breath.  Psychological: Huber unusual Anxiety depression     PHYSICAL EXAM   Constitutional: Alert, cooperative, not in acute distress.  Cardiovascular:  Rate Rhythm is regular without murmurs rubs clicks.     Thorax & Lungs: Clear to auscultation, no wheezing, rhonchi, or rales  HENT: Normocephalic, Atraumatic.  Swollen tonsils  Eyes: PERRLA, EOMI, Conjunctiva normal.   Neck: Trachia is midline pain to palpation upper pharynx.   Lymphatic: No lymphadenopathy noted.   Neurologic: Alert & oriented x 3, cranial nerves II through XII are intact, Normal motor function, Normal sensory function, No focal deficits noted.   Psychiatric: Affect normal, Judgment normal, Mood normal.     VITAL SIGNS:BP (!) 142/90   Pulse (!) 102   Temp 36.5 °C (97.7 °F) (Temporal)   Resp 12   Ht 1.473 m (4' 10\")   Wt 57.6 kg (127 lb)   SpO2 95%   BMI 26.54 kg/m²     Labs: Reviewed    Assessment:                                                     Plan:    1. Tonsillitis  Likely tonsillitis start amoxicillin 500 mg 2 times a day for 10 days  - amoxicillin (AMOXIL) 500 MG Cap; Take 1 Capsule by mouth 2 times a day.  Dispense: 20 Capsule; Refill: 0      "

## 2023-03-03 PROCEDURE — RXMED WILLOW AMBULATORY MEDICATION CHARGE: Performed by: STUDENT IN AN ORGANIZED HEALTH CARE EDUCATION/TRAINING PROGRAM

## 2023-03-03 RX ORDER — DEXTROAMPHETAMINE SACCHARATE, AMPHETAMINE ASPARTATE, DEXTROAMPHETAMINE SULFATE AND AMPHETAMINE SULFATE 3.75; 3.75; 3.75; 3.75 MG/1; MG/1; MG/1; MG/1
30 TABLET ORAL 2 TIMES DAILY
Qty: 120 TABLET | Refills: 0 | Status: SHIPPED | OUTPATIENT
Start: 2023-03-03 | End: 2023-03-31 | Stop reason: SDUPTHER

## 2023-03-04 ENCOUNTER — PHARMACY VISIT (OUTPATIENT)
Dept: PHARMACY | Facility: MEDICAL CENTER | Age: 28
End: 2023-03-04
Payer: COMMERCIAL

## 2023-03-04 PROCEDURE — RXOTC WILLOW AMBULATORY OTC CHARGE

## 2023-03-30 PROCEDURE — RXMED WILLOW AMBULATORY MEDICATION CHARGE: Performed by: STUDENT IN AN ORGANIZED HEALTH CARE EDUCATION/TRAINING PROGRAM

## 2023-03-31 DIAGNOSIS — F98.8 ATTENTION DEFICIT DISORDER (ADD) WITHOUT HYPERACTIVITY: ICD-10-CM

## 2023-03-31 RX ORDER — DEXTROAMPHETAMINE SACCHARATE, AMPHETAMINE ASPARTATE, DEXTROAMPHETAMINE SULFATE AND AMPHETAMINE SULFATE 3.75; 3.75; 3.75; 3.75 MG/1; MG/1; MG/1; MG/1
30 TABLET ORAL 2 TIMES DAILY
Qty: 120 TABLET | Refills: 0 | Status: SHIPPED | OUTPATIENT
Start: 2023-03-31 | End: 2023-05-01 | Stop reason: SDUPTHER

## 2023-03-31 NOTE — TELEPHONE ENCOUNTER
Received request via: Pharmacy  3/1/2023lov  Was the patient seen in the last year in this department? Yes    Does the patient have an active prescription (recently filled or refills available) for medication(s) requested? No    Does the patient have long-term Plus and need 100 day supply (blood pressure, diabetes and cholesterol meds only)? Patient does not have SCP

## 2023-04-01 ENCOUNTER — PHARMACY VISIT (OUTPATIENT)
Dept: PHARMACY | Facility: MEDICAL CENTER | Age: 28
End: 2023-04-01
Payer: COMMERCIAL

## 2023-04-13 ENCOUNTER — APPOINTMENT (OUTPATIENT)
Dept: OBGYN | Facility: CLINIC | Age: 28
End: 2023-04-13
Payer: COMMERCIAL

## 2023-04-14 ENCOUNTER — GYNECOLOGY VISIT (OUTPATIENT)
Dept: OBGYN | Facility: CLINIC | Age: 28
End: 2023-04-14
Payer: COMMERCIAL

## 2023-04-14 VITALS — SYSTOLIC BLOOD PRESSURE: 110 MMHG | DIASTOLIC BLOOD PRESSURE: 72 MMHG | BODY MASS INDEX: 25.08 KG/M2 | WEIGHT: 120 LBS

## 2023-04-14 DIAGNOSIS — L68.0 HIRSUTISM: ICD-10-CM

## 2023-04-14 DIAGNOSIS — N92.1 BREAKTHROUGH BLEEDING ON BIRTH CONTROL PILLS: ICD-10-CM

## 2023-04-14 PROCEDURE — 99213 OFFICE O/P EST LOW 20 MIN: CPT | Performed by: STUDENT IN AN ORGANIZED HEALTH CARE EDUCATION/TRAINING PROGRAM

## 2023-04-14 RX ORDER — SPIRONOLACTONE 50 MG/1
50 TABLET, FILM COATED ORAL DAILY
Qty: 30 TABLET | Refills: 11 | Status: SHIPPED | OUTPATIENT
Start: 2023-04-14 | End: 2023-06-23

## 2023-04-14 RX ORDER — SPIRONOLACTONE 50 MG/1
50 TABLET, FILM COATED ORAL DAILY
Qty: 30 TABLET | Refills: 11 | Status: SHIPPED | OUTPATIENT
Start: 2023-04-14 | End: 2023-04-14

## 2023-04-14 ASSESSMENT — FIBROSIS 4 INDEX: FIB4 SCORE: 0.56

## 2023-04-14 NOTE — PROGRESS NOTES
GYN FOLLOW UP VISIT    CC:  Follow-up birth control       HPI: Patient is a 27 y.o.  who presents to discuss change in birth control. She had her mirena removed 2 months ago and switched to OCPs.  She reports heavy bleeding since switching along with bad cramping, mood swings, low sex drive, nausea, and worsening hirsutism.  She wishes to switch back to Mirena IUD.        ROS:   General: denies fever / chills  HEENT: denies sore throat  CV: denies chest pain  Repiratory: denies shortness of breath  GI: denies abdominal pain  : denies dysuria    PFSH:  I personally reviewed the past medical and surgical histories.       PHYSICAL EXAMINATION:  Vital Signs:   Vitals:    23 1510   BP: 110/72   Weight: 120 lb     Body mass index is 25.08 kg/m².    Gen: appears well, NAD  Respiratory: normal effort  Abdomen: Soft, non-tender.      ASSESSMENT AND PLAN:  27 y.o.    1. Breakthrough bleeding on birth control pills  -- discussed her current symptoms with OCPs  -- discussed other contraceptive options  -- desires Mirena IUD insertion  -- recommend she return in 2 weeks for IUD insertion, use protection until then.    2. Hirsutism  -- desires trial of spironolactone for worsening hirsutism  -- all questions answered        Total Time: 20 minutes spent in chart review, exam, counseling and documentation  Ernesto Fernandes D.O.

## 2023-04-18 ENCOUNTER — TELEPHONE (OUTPATIENT)
Dept: MEDICAL GROUP | Facility: LAB | Age: 28
End: 2023-04-18
Payer: COMMERCIAL

## 2023-04-18 NOTE — LETTER
4/18/2023    KENNEY CISSE  [id number]  [place] [address]    Dear KENNEY,    Your care is very important to us, and we have noticed that on 3/13/2023, you missed your appointment with Vivian Romano M.D. at [facility]    We’re committed to providing you with the best care possible. Your appointment time is reserved for you and your provider to discuss any current or new health concerns and, together, determine the best plan of care for you. Please call [phone] to reschedule at your earliest convenience.    In some cases, Critical access hospital offers additional resources to make your healthcare more accessible, including transportation assistance, financial assistance and virtual visits. To learn more about these resources, please call [phone].    In order to keep you as informed as possible, below is a brief summary of our policy regarding missed appointments:  If a patient “No Shows”  three (3) or more appointments within a rolling 12-month period, they may be dismissed from the practice for failure to follow clinician recommendations.     If you have any concerns regarding the care you are receiving, please talk with your provider or call the office at [phone] and request to speak with the Practice . We’re committed to providing excellent care, and your feedback is invaluable.    Sincerely,    Vivian Romano M.D.

## 2023-04-25 DIAGNOSIS — L68.0 HIRSUTISM: ICD-10-CM

## 2023-04-25 RX ORDER — SPIRONOLACTONE 50 MG/1
50 TABLET, FILM COATED ORAL DAILY
Qty: 30 TABLET | Refills: 3 | Status: SHIPPED | OUTPATIENT
Start: 2023-04-25 | End: 2023-06-23

## 2023-05-01 DIAGNOSIS — F98.8 ATTENTION DEFICIT DISORDER (ADD) WITHOUT HYPERACTIVITY: ICD-10-CM

## 2023-05-01 RX ORDER — DEXTROAMPHETAMINE SACCHARATE, AMPHETAMINE ASPARTATE, DEXTROAMPHETAMINE SULFATE AND AMPHETAMINE SULFATE 3.75; 3.75; 3.75; 3.75 MG/1; MG/1; MG/1; MG/1
30 TABLET ORAL 2 TIMES DAILY
Qty: 120 TABLET | Refills: 0 | Status: SHIPPED | OUTPATIENT
Start: 2023-05-01 | End: 2023-05-30 | Stop reason: SDUPTHER

## 2023-05-01 NOTE — TELEPHONE ENCOUNTER
Received request via: Patient    Was the patient seen in the last year in this department? Yes 3/1/23    Does the patient have an active prescription (recently filled or refills available) for medication(s) requested? No    Does the patient have custodial Plus and need 100 day supply (blood pressure, diabetes and cholesterol meds only)? Patient does not have SCP

## 2023-05-30 DIAGNOSIS — F98.8 ATTENTION DEFICIT DISORDER (ADD) WITHOUT HYPERACTIVITY: ICD-10-CM

## 2023-05-30 RX ORDER — DEXTROAMPHETAMINE SACCHARATE, AMPHETAMINE ASPARTATE, DEXTROAMPHETAMINE SULFATE AND AMPHETAMINE SULFATE 3.75; 3.75; 3.75; 3.75 MG/1; MG/1; MG/1; MG/1
30 TABLET ORAL 2 TIMES DAILY
Qty: 120 TABLET | Refills: 0 | Status: SHIPPED | OUTPATIENT
Start: 2023-05-30 | End: 2023-06-29

## 2023-06-16 SDOH — HEALTH STABILITY: PHYSICAL HEALTH: ON AVERAGE, HOW MANY MINUTES DO YOU ENGAGE IN EXERCISE AT THIS LEVEL?: 60 MIN

## 2023-06-16 SDOH — ECONOMIC STABILITY: INCOME INSECURITY: IN THE LAST 12 MONTHS, WAS THERE A TIME WHEN YOU WERE NOT ABLE TO PAY THE MORTGAGE OR RENT ON TIME?: NO

## 2023-06-16 SDOH — ECONOMIC STABILITY: INCOME INSECURITY: HOW HARD IS IT FOR YOU TO PAY FOR THE VERY BASICS LIKE FOOD, HOUSING, MEDICAL CARE, AND HEATING?: NOT HARD AT ALL

## 2023-06-16 SDOH — HEALTH STABILITY: MENTAL HEALTH
STRESS IS WHEN SOMEONE FEELS TENSE, NERVOUS, ANXIOUS, OR CAN'T SLEEP AT NIGHT BECAUSE THEIR MIND IS TROUBLED. HOW STRESSED ARE YOU?: RATHER MUCH

## 2023-06-16 SDOH — ECONOMIC STABILITY: FOOD INSECURITY: WITHIN THE PAST 12 MONTHS, THE FOOD YOU BOUGHT JUST DIDN'T LAST AND YOU DIDN'T HAVE MONEY TO GET MORE.: NEVER TRUE

## 2023-06-16 SDOH — ECONOMIC STABILITY: FOOD INSECURITY: WITHIN THE PAST 12 MONTHS, YOU WORRIED THAT YOUR FOOD WOULD RUN OUT BEFORE YOU GOT MONEY TO BUY MORE.: NEVER TRUE

## 2023-06-16 SDOH — ECONOMIC STABILITY: HOUSING INSECURITY: IN THE LAST 12 MONTHS, HOW MANY PLACES HAVE YOU LIVED?: 1

## 2023-06-16 SDOH — HEALTH STABILITY: PHYSICAL HEALTH: ON AVERAGE, HOW MANY DAYS PER WEEK DO YOU ENGAGE IN MODERATE TO STRENUOUS EXERCISE (LIKE A BRISK WALK)?: 5 DAYS

## 2023-06-16 ASSESSMENT — SOCIAL DETERMINANTS OF HEALTH (SDOH)
WITHIN THE PAST 12 MONTHS, YOU WORRIED THAT YOUR FOOD WOULD RUN OUT BEFORE YOU GOT THE MONEY TO BUY MORE: NEVER TRUE
HOW HARD IS IT FOR YOU TO PAY FOR THE VERY BASICS LIKE FOOD, HOUSING, MEDICAL CARE, AND HEATING?: NOT HARD AT ALL
IN A TYPICAL WEEK, HOW MANY TIMES DO YOU TALK ON THE PHONE WITH FAMILY, FRIENDS, OR NEIGHBORS?: NEVER
HOW OFTEN DO YOU ATTEND CHURCH OR RELIGIOUS SERVICES?: MORE THAN 4 TIMES PER YEAR
HOW OFTEN DO YOU HAVE SIX OR MORE DRINKS ON ONE OCCASION: NEVER
HOW OFTEN DO YOU HAVE A DRINK CONTAINING ALCOHOL: NEVER
IN A TYPICAL WEEK, HOW MANY TIMES DO YOU TALK ON THE PHONE WITH FAMILY, FRIENDS, OR NEIGHBORS?: NEVER
HOW OFTEN DO YOU ATTEND CHURCH OR RELIGIOUS SERVICES?: MORE THAN 4 TIMES PER YEAR
DO YOU BELONG TO ANY CLUBS OR ORGANIZATIONS SUCH AS CHURCH GROUPS UNIONS, FRATERNAL OR ATHLETIC GROUPS, OR SCHOOL GROUPS?: NO
HOW OFTEN DO YOU ATTENT MEETINGS OF THE CLUB OR ORGANIZATION YOU BELONG TO?: NEVER
HOW OFTEN DO YOU ATTENT MEETINGS OF THE CLUB OR ORGANIZATION YOU BELONG TO?: NEVER
HOW MANY DRINKS CONTAINING ALCOHOL DO YOU HAVE ON A TYPICAL DAY WHEN YOU ARE DRINKING: PATIENT DOES NOT DRINK
HOW OFTEN DO YOU GET TOGETHER WITH FRIENDS OR RELATIVES?: ONCE A WEEK
DO YOU BELONG TO ANY CLUBS OR ORGANIZATIONS SUCH AS CHURCH GROUPS UNIONS, FRATERNAL OR ATHLETIC GROUPS, OR SCHOOL GROUPS?: NO
HOW OFTEN DO YOU GET TOGETHER WITH FRIENDS OR RELATIVES?: ONCE A WEEK

## 2023-06-16 ASSESSMENT — LIFESTYLE VARIABLES
HOW OFTEN DO YOU HAVE SIX OR MORE DRINKS ON ONE OCCASION: NEVER
SKIP TO QUESTIONS 9-10: 1
HOW MANY STANDARD DRINKS CONTAINING ALCOHOL DO YOU HAVE ON A TYPICAL DAY: PATIENT DOES NOT DRINK
HOW OFTEN DO YOU HAVE A DRINK CONTAINING ALCOHOL: NEVER
AUDIT-C TOTAL SCORE: 0

## 2023-06-23 ENCOUNTER — OFFICE VISIT (OUTPATIENT)
Dept: MEDICAL GROUP | Facility: LAB | Age: 28
End: 2023-06-23
Payer: COMMERCIAL

## 2023-06-23 VITALS
OXYGEN SATURATION: 100 % | DIASTOLIC BLOOD PRESSURE: 70 MMHG | RESPIRATION RATE: 12 BRPM | TEMPERATURE: 97.4 F | HEART RATE: 121 BPM | HEIGHT: 58 IN | SYSTOLIC BLOOD PRESSURE: 134 MMHG | WEIGHT: 115 LBS | BODY MASS INDEX: 24.14 KG/M2

## 2023-06-23 DIAGNOSIS — Z00.00 WELL WOMAN EXAM WITHOUT GYNECOLOGICAL EXAM: ICD-10-CM

## 2023-06-23 DIAGNOSIS — F98.8 ATTENTION DEFICIT DISORDER (ADD) WITHOUT HYPERACTIVITY: ICD-10-CM

## 2023-06-23 DIAGNOSIS — Z23 NEED FOR VACCINATION: ICD-10-CM

## 2023-06-23 DIAGNOSIS — E55.9 VITAMIN D DEFICIENCY: ICD-10-CM

## 2023-06-23 DIAGNOSIS — F51.01 PRIMARY INSOMNIA: ICD-10-CM

## 2023-06-23 PROBLEM — E66.9 OBESITY (BMI 30-39.9): Status: RESOLVED | Noted: 2018-03-27 | Resolved: 2023-06-23

## 2023-06-23 PROCEDURE — 3078F DIAST BP <80 MM HG: CPT | Performed by: FAMILY MEDICINE

## 2023-06-23 PROCEDURE — 90471 IMMUNIZATION ADMIN: CPT | Performed by: FAMILY MEDICINE

## 2023-06-23 PROCEDURE — 90651 9VHPV VACCINE 2/3 DOSE IM: CPT | Performed by: FAMILY MEDICINE

## 2023-06-23 PROCEDURE — 99395 PREV VISIT EST AGE 18-39: CPT | Mod: 25 | Performed by: FAMILY MEDICINE

## 2023-06-23 PROCEDURE — 3075F SYST BP GE 130 - 139MM HG: CPT | Performed by: FAMILY MEDICINE

## 2023-06-23 RX ORDER — DEXTROAMPHETAMINE SACCHARATE, AMPHETAMINE ASPARTATE MONOHYDRATE, DEXTROAMPHETAMINE SULFATE AND AMPHETAMINE SULFATE 7.5; 7.5; 7.5; 7.5 MG/1; MG/1; MG/1; MG/1
60 CAPSULE, EXTENDED RELEASE ORAL EVERY MORNING
Qty: 60 CAPSULE | Refills: 0 | Status: SHIPPED | OUTPATIENT
Start: 2023-06-23 | End: 2023-07-11

## 2023-06-23 RX ORDER — CLONAZEPAM 1 MG/1
1 TABLET ORAL NIGHTLY
Qty: 30 TABLET | Refills: 3 | Status: SHIPPED | OUTPATIENT
Start: 2023-06-23 | End: 2023-08-06 | Stop reason: SDUPTHER

## 2023-06-23 ASSESSMENT — PATIENT HEALTH QUESTIONNAIRE - PHQ9
5. POOR APPETITE OR OVEREATING: 2 - MORE THAN HALF THE DAYS
SUM OF ALL RESPONSES TO PHQ QUESTIONS 1-9: 11
CLINICAL INTERPRETATION OF PHQ2 SCORE: 2

## 2023-06-23 ASSESSMENT — FIBROSIS 4 INDEX: FIB4 SCORE: 0.58

## 2023-06-23 NOTE — PROGRESS NOTES
Subjective:     Chief Complaint   Patient presents with    Annual Exam         HPI:   KENNEY presents today for annual.     Diet: pretty healthy in general. Lots of greens. Trying to increase protein.   Exercise: lots more of late. Strength, yoga and pilates.   Sleep: not good when off clonazepam. Has seen sleep medicine who recommended this.       LMP: Not on OCP right now. Some irregular menses. Regular but will stop mid period for a day, then start back up. Worse cramps as well. No heavy bleeding.  Sees ob/gyne.   Did have some more infections on Mirena.     Has been compliant and stable on her Adderall dose although she is finding that with the immediate dosing she only has about 2 to 3 hours of efficacy before she feels things wears off.  She does note that she does feel better on the medication when it is active but she only gets a couple hours during the day.  Then with the second dose is a similar thing.  The only long-acting medication she was on in the past was Concerta which did cause some mood and mental health concerns.      Current Outpatient Medications Ordered in Epic   Medication Sig Dispense Refill    clonazePAM (KLONOPIN) 1 MG Tab Take 1 Tablet by mouth every evening for 30 days. 30 Tablet 3    amphetamine-dextroamphetamine ER (ADDERALL XR) 30 MG XR capsule Take 2 Capsules by mouth every morning for 30 days. 60 Capsule 0    amphetamine-dextroamphetamine (ADDERALL) 15 MG tablet Take 2 Tablets by mouth 2 times a day for 30 days. 120 Tablet 0    albuterol 108 (90 Base) MCG/ACT Aero Soln inhalation aerosol Inhale 2 Puffs by mouth every 6 hours as needed for Shortness of Breath. 8.5 g 3     No current [facility]-ordered facility-administered medications on file.         ROS:  Gen: no fevers/chills, no changes in weight  Eyes: no changes in vision  ENT: no sore throat, no hearing loss, no bloody nose  Pulm: no sob, no cough  CV: no chest pain, no palpitations  GI: no nausea/vomiting, no diarrhea  : no  "dysuria  MSk: no myalgias  Skin: no rash  Neuro: no headaches, no numbness/tingling  Heme/Lymph: no easy bruising      Objective:     Exam:  /70 (BP Location: Right arm, Patient Position: Sitting, BP Cuff Size: Adult)   Pulse (!) 121   Temp 36.3 °C (97.4 °F)   Resp 12   Ht 1.473 m (4' 10\")   Wt 52.2 kg (115 lb)   SpO2 100%   BMI 24.04 kg/m²  Body mass index is 24.04 kg/m².    Gen: Alert and oriented, No apparent distress.  Neck: Neck is supple without lymphadenopathy.  Lungs: Normal effort, CTA bilaterally, no wheezes, rhonchi, or rales  CV: Regular rate and rhythm. No murmurs, rubs, or gallops.  Ext: No clubbing, cyanosis, edema.      Assessment & Plan:     28 y.o. female with the following -     1. Well woman exam without gynecological exam  Discussed diet and exercise recommendations.  Overall she is doing very well.  Encouraged her to continue working on more protein in the diet and also a lifting a little bit heavier for strength.  We did discuss different birth control options including IUD, Nexplanon implant, NuvaRing.  She will discuss with OB/GYN.  We did discuss condoms as well in the interim.    2. Primary insomnia  Has seen sleep medicine in the past.  Was compliant and very regular with clonazepam the past and had benefit.  Refill sent in.  - clonazePAM (KLONOPIN) 1 MG Tab; Take 1 Tablet by mouth every evening for 30 days.  Dispense: 30 Tablet; Refill: 3    3. Vitamin D deficiency    - VITAMIN D,25 HYDROXY (DEFICIENCY); Future    4. Attention deficit disorder (ADD) without hyperactivity  We will see if a long-acting Adderall is more beneficial for her.  We did discuss still nationwide shortages and we may have to stick with immediate release until we can find this.  She will let me know.  - amphetamine-dextroamphetamine ER (ADDERALL XR) 30 MG XR capsule; Take 2 Capsules by mouth every morning for 30 days.  Dispense: 60 Capsule; Refill: 0    5. Need for vaccination  We will catch her up on " her HPV today.  - 9VHPV Vaccine 2-3 Dose (GARDASIL 9)          No follow-ups on file.    Please note that this dictation was created using voice recognition software. I have made every reasonable attempt to correct obvious errors, but I expect that there are errors of grammar and possibly content that I did not discover before finalizing the note.

## 2023-07-10 ENCOUNTER — PATIENT MESSAGE (OUTPATIENT)
Dept: MEDICAL GROUP | Facility: LAB | Age: 28
End: 2023-07-10
Payer: COMMERCIAL

## 2023-07-10 DIAGNOSIS — F98.8 ATTENTION DEFICIT DISORDER (ADD) WITHOUT HYPERACTIVITY: ICD-10-CM

## 2023-07-11 ENCOUNTER — PATIENT MESSAGE (OUTPATIENT)
Dept: MEDICAL GROUP | Facility: LAB | Age: 28
End: 2023-07-11
Payer: COMMERCIAL

## 2023-07-11 DIAGNOSIS — F98.8 ATTENTION DEFICIT DISORDER (ADD) WITHOUT HYPERACTIVITY: ICD-10-CM

## 2023-07-11 RX ORDER — DEXTROAMPHETAMINE SACCHARATE, AMPHETAMINE ASPARTATE, DEXTROAMPHETAMINE SULFATE AND AMPHETAMINE SULFATE 3.75; 3.75; 3.75; 3.75 MG/1; MG/1; MG/1; MG/1
15 TABLET ORAL 2 TIMES DAILY
Qty: 60 TABLET | Refills: 0 | Status: SHIPPED | OUTPATIENT
Start: 2023-07-11 | End: 2023-07-27

## 2023-07-13 RX ORDER — DEXTROAMPHETAMINE SACCHARATE, AMPHETAMINE ASPARTATE, DEXTROAMPHETAMINE SULFATE AND AMPHETAMINE SULFATE 3.75; 3.75; 3.75; 3.75 MG/1; MG/1; MG/1; MG/1
15 TABLET ORAL 4 TIMES DAILY
Qty: 56 TABLET | Refills: 0 | Status: SHIPPED | OUTPATIENT
Start: 2023-07-13 | End: 2023-07-27

## 2023-07-27 ENCOUNTER — OFFICE VISIT (OUTPATIENT)
Dept: URGENT CARE | Facility: CLINIC | Age: 28
End: 2023-07-27
Payer: COMMERCIAL

## 2023-07-27 ENCOUNTER — PATIENT MESSAGE (OUTPATIENT)
Dept: MEDICAL GROUP | Facility: LAB | Age: 28
End: 2023-07-27

## 2023-07-27 VITALS
HEIGHT: 58 IN | HEART RATE: 86 BPM | OXYGEN SATURATION: 99 % | BODY MASS INDEX: 26.09 KG/M2 | RESPIRATION RATE: 20 BRPM | SYSTOLIC BLOOD PRESSURE: 110 MMHG | WEIGHT: 124.3 LBS | TEMPERATURE: 98.4 F | DIASTOLIC BLOOD PRESSURE: 60 MMHG

## 2023-07-27 DIAGNOSIS — J02.9 SORE THROAT: ICD-10-CM

## 2023-07-27 DIAGNOSIS — R05.1 ACUTE COUGH: ICD-10-CM

## 2023-07-27 DIAGNOSIS — F98.8 ATTENTION DEFICIT DISORDER (ADD) WITHOUT HYPERACTIVITY: ICD-10-CM

## 2023-07-27 DIAGNOSIS — R68.89 FLU-LIKE SYMPTOMS: ICD-10-CM

## 2023-07-27 PROCEDURE — 87651 STREP A DNA AMP PROBE: CPT | Performed by: NURSE PRACTITIONER

## 2023-07-27 PROCEDURE — 3078F DIAST BP <80 MM HG: CPT | Performed by: NURSE PRACTITIONER

## 2023-07-27 PROCEDURE — 99213 OFFICE O/P EST LOW 20 MIN: CPT | Performed by: NURSE PRACTITIONER

## 2023-07-27 PROCEDURE — 0241U POCT CEPHEID COV-2, FLU A/B, RSV - PCR: CPT | Performed by: NURSE PRACTITIONER

## 2023-07-27 PROCEDURE — 3074F SYST BP LT 130 MM HG: CPT | Performed by: NURSE PRACTITIONER

## 2023-07-27 RX ORDER — PREDNISONE 10 MG/1
20 TABLET ORAL DAILY
Qty: 10 TABLET | Refills: 0 | Status: SHIPPED | OUTPATIENT
Start: 2023-07-27 | End: 2023-08-01

## 2023-07-27 RX ORDER — DEXTROAMPHETAMINE SACCHARATE, AMPHETAMINE ASPARTATE, DEXTROAMPHETAMINE SULFATE AND AMPHETAMINE SULFATE 7.5; 7.5; 7.5; 7.5 MG/1; MG/1; MG/1; MG/1
30 TABLET ORAL 2 TIMES DAILY
Qty: 60 TABLET | Refills: 0 | Status: SHIPPED | OUTPATIENT
Start: 2023-07-27 | End: 2023-08-25 | Stop reason: SDUPTHER

## 2023-07-27 RX ORDER — DEXTROMETHORPHAN HYDROBROMIDE AND PROMETHAZINE HYDROCHLORIDE 15; 6.25 MG/5ML; MG/5ML
5 SYRUP ORAL EVERY 6 HOURS PRN
Qty: 118 ML | Refills: 0 | Status: SHIPPED | OUTPATIENT
Start: 2023-07-27 | End: 2023-08-03

## 2023-07-27 ASSESSMENT — FIBROSIS 4 INDEX: FIB4 SCORE: 0.58

## 2023-07-30 ASSESSMENT — ENCOUNTER SYMPTOMS
HEADACHES: 1
DIARRHEA: 0
ABDOMINAL PAIN: 0
MYALGIAS: 1
NAUSEA: 0
RHINORRHEA: 1
EYES NEGATIVE: 1
CHILLS: 0
SORE THROAT: 1
COUGH: 1
FEVER: 1
CARDIOVASCULAR NEGATIVE: 1
WHEEZING: 1

## 2023-07-31 NOTE — PROGRESS NOTES
"Subjective:   KENNEY CISSE is a 28 y.o. female who presents for Nasal Congestion (X4days SNEEZING/CHEST CONGESTION/fever 100.7/dry cough/headache/body aches/fatigue/)      URI   This is a new problem. Episode onset: 4 days. The problem has been unchanged. The maximum temperature recorded prior to her arrival was 100.4 - 100.9 F. The fever has been present for 1 to 2 days. Associated symptoms include congestion, coughing, headaches, a plugged ear sensation, rhinorrhea, sneezing, a sore throat and wheezing. Pertinent negatives include no abdominal pain, diarrhea or nausea. She has tried antihistamine, decongestant and NSAIDs for the symptoms. The treatment provided mild relief.       Review of Systems   Constitutional:  Positive for fever and malaise/fatigue. Negative for chills.   HENT:  Positive for congestion, rhinorrhea, sneezing and sore throat.    Eyes: Negative.    Respiratory:  Positive for cough and wheezing.    Cardiovascular: Negative.    Gastrointestinal:  Negative for abdominal pain, diarrhea and nausea.   Genitourinary: Negative.    Musculoskeletal:  Positive for myalgias.   Neurological:  Positive for headaches.       Medications, Allergies, and current problem list reviewed today in Epic.     Objective:     /60 (BP Location: Left arm, Patient Position: Sitting)   Pulse 86   Temp 36.9 °C (98.4 °F) (Temporal)   Resp 20   Ht 1.473 m (4' 10\")   Wt 56.4 kg (124 lb 4.8 oz)   SpO2 99%     Physical Exam  Vitals reviewed.   Constitutional:       General: She is not in acute distress.     Appearance: Normal appearance. She is ill-appearing.   HENT:      Head: Normocephalic and atraumatic.      Right Ear: Tympanic membrane, ear canal and external ear normal.      Left Ear: Tympanic membrane, ear canal and external ear normal.      Nose: Congestion and rhinorrhea present.      Mouth/Throat:      Mouth: Mucous membranes are moist.      Pharynx: Oropharynx is clear. Posterior oropharyngeal erythema " present.   Eyes:      Extraocular Movements: Extraocular movements intact.      Conjunctiva/sclera: Conjunctivae normal.      Pupils: Pupils are equal, round, and reactive to light.   Cardiovascular:      Rate and Rhythm: Normal rate and regular rhythm.   Pulmonary:      Effort: Pulmonary effort is normal.      Breath sounds: Wheezing present.   Abdominal:      General: Abdomen is flat.      Palpations: Abdomen is soft.   Musculoskeletal:         General: Normal range of motion.      Cervical back: Normal range of motion and neck supple.   Skin:     General: Skin is warm and dry.      Capillary Refill: Capillary refill takes less than 2 seconds.   Neurological:      General: No focal deficit present.      Mental Status: She is alert.   Psychiatric:         Mood and Affect: Mood normal.         Behavior: Behavior normal.         Lab Results/POC Test Results   Results for orders placed or performed in visit on 07/27/23   POCT CoV-2, Flu A/B, RSV by PCR   Result Value Ref Range    SARS-CoV-2 by PCR Negative Negative, Invalid    Influenza virus A RNA Negative Negative, Invalid    Influenza virus B, PCR Negative Negative, Invalid    RSV, PCR Negative Negative, Invalid   POCT GROUP A STREP, PCR   Result Value Ref Range    POC Group A Strep, PCR Not Detected Not Detected, Invalid             Assessment/Plan:     Diagnosis and associated orders:     1. Acute cough  predniSONE (DELTASONE) 10 MG Tab    promethazine-dextromethorphan (PROMETHAZINE-DM) 6.25-15 MG/5ML syrup      2. Flu-like symptoms  POCT CoV-2, Flu A/B, RSV by PCR      3. Sore throat  POCT GROUP A STREP, PCR         Comments/MDM:     Advised patient symptoms are viral in etiology, recommend supportive care. Increased fluids and rest.  Recommend over-the-counter cold and flu medications and Tylenol and/or ibuprofen for symptomatic relief and fevers.  Discussed use of nedi-pot, humidifier, and Flonase nasal spray as well.  Discussed good hand hygiene and ways to  decrease spread of disease.  Follow-up with PCP return for reevaluation if symptoms persist/worsen.  Patient offered discharge instructions regarding viral illness.  The patient demonstrated a good understanding and agreed with the treatment plan.            Differential diagnosis, natural history, supportive care, and indications for immediate follow-up discussed.    Advised the patient to follow-up with the primary care physician for recheck, reevaluation, and consideration of further management.    Please note that this dictation was created using voice recognition software. I have made a reasonable attempt to correct obvious errors, but I expect that there are errors of grammar and possibly content that I did not discover before finalizing the note.    This note was electronically signed by KELSI Whitehead

## 2023-08-06 DIAGNOSIS — F51.01 PRIMARY INSOMNIA: ICD-10-CM

## 2023-08-07 RX ORDER — CLONAZEPAM 1 MG/1
1 TABLET ORAL NIGHTLY
Qty: 30 TABLET | Refills: 3 | Status: SHIPPED | OUTPATIENT
Start: 2023-08-07 | End: 2023-09-19 | Stop reason: SDUPTHER

## 2023-08-07 NOTE — TELEPHONE ENCOUNTER
Received request via: Pharmacy  6/23/23lov  Was the patient seen in the last year in this department? Yes    Does the patient have an active prescription (recently filled or refills available) for medication(s) requested? No    Does the patient have shelter Plus and need 100 day supply (blood pressure, diabetes and cholesterol meds only)? Patient does not have SCP

## 2023-08-25 ENCOUNTER — PATIENT MESSAGE (OUTPATIENT)
Dept: MEDICAL GROUP | Facility: LAB | Age: 28
End: 2023-08-25

## 2023-08-25 ENCOUNTER — OFFICE VISIT (OUTPATIENT)
Dept: MEDICAL GROUP | Facility: LAB | Age: 28
End: 2023-08-25
Payer: COMMERCIAL

## 2023-08-25 VITALS
HEIGHT: 58 IN | OXYGEN SATURATION: 96 % | RESPIRATION RATE: 12 BRPM | BODY MASS INDEX: 25.19 KG/M2 | DIASTOLIC BLOOD PRESSURE: 80 MMHG | HEART RATE: 100 BPM | WEIGHT: 120 LBS | SYSTOLIC BLOOD PRESSURE: 120 MMHG | TEMPERATURE: 97.4 F

## 2023-08-25 DIAGNOSIS — N63.21 MASS OF UPPER OUTER QUADRANT OF LEFT BREAST: ICD-10-CM

## 2023-08-25 DIAGNOSIS — F98.8 ATTENTION DEFICIT DISORDER (ADD) WITHOUT HYPERACTIVITY: ICD-10-CM

## 2023-08-25 PROCEDURE — 3074F SYST BP LT 130 MM HG: CPT | Performed by: FAMILY MEDICINE

## 2023-08-25 PROCEDURE — 99213 OFFICE O/P EST LOW 20 MIN: CPT | Performed by: FAMILY MEDICINE

## 2023-08-25 PROCEDURE — 3079F DIAST BP 80-89 MM HG: CPT | Performed by: FAMILY MEDICINE

## 2023-08-25 RX ORDER — DEXTROAMPHETAMINE SACCHARATE, AMPHETAMINE ASPARTATE, DEXTROAMPHETAMINE SULFATE AND AMPHETAMINE SULFATE 7.5; 7.5; 7.5; 7.5 MG/1; MG/1; MG/1; MG/1
30 TABLET ORAL 2 TIMES DAILY
Qty: 60 TABLET | Refills: 0 | Status: SHIPPED | OUTPATIENT
Start: 2023-08-25 | End: 2023-08-25 | Stop reason: SDUPTHER

## 2023-08-25 RX ORDER — AMOXICILLIN AND CLAVULANATE POTASSIUM 875; 125 MG/1; MG/1
1 TABLET, FILM COATED ORAL 2 TIMES DAILY
Qty: 20 TABLET | Refills: 0 | Status: SHIPPED | OUTPATIENT
Start: 2023-08-25 | End: 2023-09-04

## 2023-08-25 RX ORDER — DEXTROAMPHETAMINE SACCHARATE, AMPHETAMINE ASPARTATE, DEXTROAMPHETAMINE SULFATE AND AMPHETAMINE SULFATE 7.5; 7.5; 7.5; 7.5 MG/1; MG/1; MG/1; MG/1
30 TABLET ORAL 2 TIMES DAILY
Qty: 60 TABLET | Refills: 0 | Status: SHIPPED | OUTPATIENT
Start: 2023-08-25 | End: 2023-09-22 | Stop reason: SDUPTHER

## 2023-08-25 ASSESSMENT — FIBROSIS 4 INDEX: FIB4 SCORE: 0.58

## 2023-08-25 NOTE — PROGRESS NOTES
"Subjective:     Chief Complaint   Patient presents with    Breast Mass     X TUESDAY LEFT         HPI:   KENNEY presents today with left sided breat lump.   Period supposed to start tomorrow. Very tender on left. No right sided tenderness.           Current Outpatient Medications Ordered in Epic   Medication Sig Dispense Refill    clonazePAM (KLONOPIN) 1 MG Tab Take 1 Tablet by mouth every evening for 30 days. 30 Tablet 3    amphetamine-dextroamphetamine (ADDERALL) 30 MG tablet Take 1 Tablet by mouth 2 times a day for 30 days. 60 Tablet 0    albuterol 108 (90 Base) MCG/ACT Aero Soln inhalation aerosol Inhale 2 Puffs by mouth every 6 hours as needed for Shortness of Breath. 8.5 g 3     No current [facility]-ordered facility-administered medications on file.         ROS:  Gen: no fevers/chills, no changes in weight  Eyes: no changes in vision  ENT: no sore throat, no hearing loss, no bloody nose  Pulm: no sob, no cough  CV: no chest pain, no palpitations  GI: no nausea/vomiting, no diarrhea  : no dysuria  MSk: no myalgias  Skin: no rash  Neuro: no headaches, no numbness/tingling  Heme/Lymph: no easy bruising      Objective:     Exam:  /80   Pulse 100   Temp 36.3 °C (97.4 °F)   Resp 12   Ht 1.473 m (4' 10\")   Wt 54.4 kg (120 lb)   SpO2 96%   BMI 25.08 kg/m²  Body mass index is 25.08 kg/m².    Gen: AAOx3, NAD, well appearing  HEENT: NCAT, EOMI, Nares patent, Mucosa moist  Resp: Normal chest wall rise and fall, not SOB, no tachypnea  Skin: no rash or abnormality of visible skin.   Psych: normal speech, not slurred, good insight, affect full  MSK: Moves all four limbs equally and normally, gait normal  Breast exam: Left side upper outer quadrant tender circumscribed increased density noted, approximately large egg sized.  No erythema noted.  Definite difference from the right side.  No masses in the right side though she does have some more dense tissue in the upper outer quadrant on the right side as " well.      Assessment & Plan:     28 y.o. female with the following -   1. Mass of upper outer quadrant of left breast  Discussed definite discrepancy from the left side to the right side.  We did discuss that in fact tenderness is oftentimes not concerning for a cancer standpoint.  May be more related to hormone changes with her menses about to start but also could be blocked duct or even an infection.  If she cannot get her ultrasound scheduled until after the weekend we may go ahead and just cover her with an antibiotic in the interim.  She will let me know when her appointment is.  - US-BREAST LIMITED-LEFT; Future            No follow-ups on file.    Please note that this dictation was created using voice recognition software. I have made every reasonable attempt to correct obvious errors, but I expect that there are errors of grammar and possibly content that I did not discover before finalizing the note.

## 2023-09-19 DIAGNOSIS — F51.01 PRIMARY INSOMNIA: ICD-10-CM

## 2023-09-21 RX ORDER — CLONAZEPAM 1 MG/1
1 TABLET ORAL NIGHTLY
Qty: 30 TABLET | Refills: 3 | Status: SHIPPED | OUTPATIENT
Start: 2023-09-21 | End: 2023-11-01 | Stop reason: SDUPTHER

## 2023-09-22 DIAGNOSIS — F98.8 ATTENTION DEFICIT DISORDER (ADD) WITHOUT HYPERACTIVITY: ICD-10-CM

## 2023-09-22 RX ORDER — DEXTROAMPHETAMINE SACCHARATE, AMPHETAMINE ASPARTATE, DEXTROAMPHETAMINE SULFATE AND AMPHETAMINE SULFATE 7.5; 7.5; 7.5; 7.5 MG/1; MG/1; MG/1; MG/1
30 TABLET ORAL 2 TIMES DAILY
Qty: 60 TABLET | Refills: 0 | Status: SHIPPED | OUTPATIENT
Start: 2023-09-22 | End: 2023-10-18 | Stop reason: SDUPTHER

## 2023-09-22 NOTE — TELEPHONE ENCOUNTER
Received request via: Patient    Was the patient seen in the last year in this department? Yes 8/25/2023    Does the patient have an active prescription (recently filled or refills available) for medication(s) requested? No    Does the patient have snf Plus and need 100 day supply (blood pressure, diabetes and cholesterol meds only)? Patient does not have SCP

## 2023-09-25 ENCOUNTER — HOSPITAL ENCOUNTER (OUTPATIENT)
Dept: RADIOLOGY | Facility: MEDICAL CENTER | Age: 28
End: 2023-09-25
Attending: FAMILY MEDICINE
Payer: COMMERCIAL

## 2023-09-25 DIAGNOSIS — N63.21 MASS OF UPPER OUTER QUADRANT OF LEFT BREAST: ICD-10-CM

## 2023-09-25 PROCEDURE — 76642 ULTRASOUND BREAST LIMITED: CPT | Mod: RT

## 2023-09-26 ENCOUNTER — GYNECOLOGY VISIT (OUTPATIENT)
Dept: OBGYN | Facility: CLINIC | Age: 28
End: 2023-09-26
Payer: COMMERCIAL

## 2023-09-26 VITALS — WEIGHT: 117.5 LBS | SYSTOLIC BLOOD PRESSURE: 134 MMHG | BODY MASS INDEX: 24.56 KG/M2 | DIASTOLIC BLOOD PRESSURE: 86 MMHG

## 2023-09-26 DIAGNOSIS — N94.6 DYSMENORRHEA: Primary | ICD-10-CM

## 2023-09-26 DIAGNOSIS — N92.0 MENORRHAGIA WITH REGULAR CYCLE: ICD-10-CM

## 2023-09-26 PROCEDURE — 99213 OFFICE O/P EST LOW 20 MIN: CPT | Performed by: OBSTETRICS & GYNECOLOGY

## 2023-09-26 PROCEDURE — 3079F DIAST BP 80-89 MM HG: CPT | Performed by: OBSTETRICS & GYNECOLOGY

## 2023-09-26 PROCEDURE — 3075F SYST BP GE 130 - 139MM HG: CPT | Performed by: OBSTETRICS & GYNECOLOGY

## 2023-09-26 RX ORDER — MEFENAMIC ACID 250 MG/1
CAPSULE ORAL
Qty: 21 CAPSULE | Refills: 3 | Status: SHIPPED | OUTPATIENT
Start: 2023-09-26 | End: 2024-01-22

## 2023-09-26 ASSESSMENT — FIBROSIS 4 INDEX: FIB4 SCORE: 0.58

## 2023-09-26 NOTE — PROGRESS NOTES
Patient is her for GYN Visit. She would like to discuss with you about her Menses and BC. Her Weight is 117.5 lb and BP is 134/86. Her LMP was on 09/20/2023 and Last Pap was on 02/01/2023

## 2023-09-28 ENCOUNTER — IMMUNIZATION (OUTPATIENT)
Dept: OCCUPATIONAL MEDICINE | Facility: CLINIC | Age: 28
End: 2023-09-28

## 2023-09-28 DIAGNOSIS — Z23 NEED FOR VACCINATION: Primary | ICD-10-CM

## 2023-09-28 PROCEDURE — 90686 IIV4 VACC NO PRSV 0.5 ML IM: CPT | Performed by: PREVENTIVE MEDICINE

## 2023-10-09 ENCOUNTER — GYNECOLOGY VISIT (OUTPATIENT)
Dept: OBGYN | Facility: CLINIC | Age: 28
End: 2023-10-09
Payer: COMMERCIAL

## 2023-10-09 VITALS — DIASTOLIC BLOOD PRESSURE: 82 MMHG | SYSTOLIC BLOOD PRESSURE: 125 MMHG | BODY MASS INDEX: 24.45 KG/M2 | WEIGHT: 117 LBS

## 2023-10-09 DIAGNOSIS — Z30.430 ENCOUNTER FOR IUD INSERTION: Primary | ICD-10-CM

## 2023-10-09 LAB
POCT INT CON NEG: NEGATIVE
POCT INT CON POS: POSITIVE
POCT URINE PREGNANCY TEST: NEGATIVE

## 2023-10-09 PROCEDURE — 58300 INSERT INTRAUTERINE DEVICE: CPT | Performed by: OBSTETRICS & GYNECOLOGY

## 2023-10-09 PROCEDURE — 3079F DIAST BP 80-89 MM HG: CPT | Performed by: OBSTETRICS & GYNECOLOGY

## 2023-10-09 PROCEDURE — 3074F SYST BP LT 130 MM HG: CPT | Performed by: OBSTETRICS & GYNECOLOGY

## 2023-10-09 PROCEDURE — 81025 URINE PREGNANCY TEST: CPT | Performed by: OBSTETRICS & GYNECOLOGY

## 2023-10-09 ASSESSMENT — FIBROSIS 4 INDEX: FIB4 SCORE: 0.58

## 2023-10-09 NOTE — PROGRESS NOTES
IUD INSERTION PROCEDURE NOTE    Today the patient is counseled on procedure of IUD insertion. I discussed with the patient the risks of IUD including infection, risk of IUD expulsion, the risk of uterine perforation (1-2/1000, slightly higher while Bfing), risk of IUD migration or lost strings.  If the IUD does migrate the patient may require a separate procedure such as hysteroscopy or laparoscopy to remove or retrieve the migrated IUD. I also discussed the 0.1% risk of pregnancy with IUD use which coincides with an increased risk of ectopic pregnancy with IUD use.  We reviewed leaving the strings long enough to prevent disappearance however this may initially result in the possibility that partner can feel the IUD during intercourse; this typically resolves as the strings soften and tuck behind cervix. I also discussed the side effects of progestin-containing IUDs including decreased, irregular or absent menses and/or spotting.  All questions answered.  Informed consent is signed.  UPT negative.     Procedure  The pelvis was examined and the uterus is retroverted.  The speculum was placed.  Beta-dine was applied to the cervix.  Allis clamp was used used to grasp the cervix and provide counter traction.  The uterus was sounded with the IUD device to 9cm. The device was withdrawn 1cm and the IUD was then deployed and gently advanced to the fundus without complications then the device removed.  Type of IUD placed: Mirena       Aftercare discussed. She is to return for fever, worsening pelvic pain, abdominal pain, syncope, unusually heavy vaginal bleeding, suspected expulsion, foul smelling vaginal discharge, or pregnancy-like symptoms.     If the patient is comfortable she may also perform a digital self examination to check for the strings or return to office in 1 mo for any concerns with IUD symptoms/placement/possible expulsion, otherwise no follow up needed.    *Procedure performed by Luis Brown PA-C for  training purposes.  I was present and participated in the procedure. No complications.     Lynda Ochoa D.O.

## 2023-10-09 NOTE — PROCEDURES
IUD INSERTION PROCEDURE NOTE    Today the patient is counseled on procedure of IUD insertion. I discussed with the patient the risks of IUD including infection, risk of IUD expulsion, the risk of uterine perforation (1-2/1000, slightly higher while Bfing), risk of IUD migration or lost strings.  If the IUD does migrate the patient may require a separate procedure such as hysteroscopy or laparoscopy to remove or retrieve the migrated IUD. I also discussed the 0.1% risk of pregnancy with IUD use which coincides with an increased risk of ectopic pregnancy with IUD use.  We reviewed leaving the strings long enough to prevent disappearance however this may initially result in the possibility that partner can feel the IUD during intercourse; this typically resolves as the strings soften and tuck behind cervix. I also discussed the side effects of progestin-containing IUDs including decreased, irregular or absent menses and/or spotting.  All questions answered.  Informed consent is signed.  UPT negative.     Procedure  The pelvis was examined and the uterus is retroverted.  The speculum was placed.  Beta-dine was applied to the cervix.  Allis clamp was used used to grasp the cervix and provide counter traction.  The uterus was sounded with the IUD device to 9cm. The device was withdrawn 1cm and the IUD was then deployed and gently advanced to the fundus without complications then the device removed.  Type of IUD placed: Mirena       Aftercare discussed. She is to return for fever, worsening pelvic pain, abdominal pain, syncope, unusually heavy vaginal bleeding, suspected expulsion, foul smelling vaginal discharge, or pregnancy-like symptoms.     If the patient is comfortable she may also perform a digital self examination to check for the strings or return to office in 1 mo for any concerns with IUD symptoms/placement/possible expulsion, otherwise no follow up needed.    *Procedure performed by Luis Brown for training  purposes.  I was present and participated in the procedure. No complications.     Lynda Ochoa D.O.

## 2023-10-18 DIAGNOSIS — F98.8 ATTENTION DEFICIT DISORDER (ADD) WITHOUT HYPERACTIVITY: ICD-10-CM

## 2023-10-19 RX ORDER — DEXTROAMPHETAMINE SACCHARATE, AMPHETAMINE ASPARTATE, DEXTROAMPHETAMINE SULFATE AND AMPHETAMINE SULFATE 7.5; 7.5; 7.5; 7.5 MG/1; MG/1; MG/1; MG/1
30 TABLET ORAL 2 TIMES DAILY
Qty: 60 TABLET | Refills: 0 | Status: SHIPPED | OUTPATIENT
Start: 2023-10-19 | End: 2023-11-13 | Stop reason: SDUPTHER

## 2023-11-01 DIAGNOSIS — F51.01 PRIMARY INSOMNIA: ICD-10-CM

## 2023-11-01 NOTE — TELEPHONE ENCOUNTER
Received request via: Pharmacy    Was the patient seen in the last year in this department? Yes  8/25/23  Does the patient have an active prescription (recently filled or refills available) for medication(s) requested? No    Does the patient have [facility] Plus and need 100 day supply (blood pressure, diabetes and cholesterol meds only)? Medication is not for cholesterol, blood pressure or diabetes

## 2023-11-02 RX ORDER — CLONAZEPAM 1 MG/1
1 TABLET ORAL NIGHTLY
Qty: 30 TABLET | Refills: 3 | Status: SHIPPED | OUTPATIENT
Start: 2023-11-02 | End: 2024-01-03 | Stop reason: SDUPTHER

## 2023-11-13 DIAGNOSIS — F98.8 ATTENTION DEFICIT DISORDER (ADD) WITHOUT HYPERACTIVITY: ICD-10-CM

## 2023-11-13 RX ORDER — DEXTROAMPHETAMINE SACCHARATE, AMPHETAMINE ASPARTATE, DEXTROAMPHETAMINE SULFATE AND AMPHETAMINE SULFATE 7.5; 7.5; 7.5; 7.5 MG/1; MG/1; MG/1; MG/1
30 TABLET ORAL 2 TIMES DAILY
Qty: 60 TABLET | Refills: 0 | Status: SHIPPED | OUTPATIENT
Start: 2023-11-13 | End: 2023-11-16 | Stop reason: SDUPTHER

## 2023-11-16 DIAGNOSIS — F98.8 ATTENTION DEFICIT DISORDER (ADD) WITHOUT HYPERACTIVITY: ICD-10-CM

## 2023-11-16 RX ORDER — DEXTROAMPHETAMINE SACCHARATE, AMPHETAMINE ASPARTATE, DEXTROAMPHETAMINE SULFATE AND AMPHETAMINE SULFATE 7.5; 7.5; 7.5; 7.5 MG/1; MG/1; MG/1; MG/1
30 TABLET ORAL 2 TIMES DAILY
Qty: 60 TABLET | Refills: 0 | Status: SHIPPED | OUTPATIENT
Start: 2023-11-16 | End: 2023-12-12 | Stop reason: SDUPTHER

## 2023-12-12 DIAGNOSIS — F98.8 ATTENTION DEFICIT DISORDER (ADD) WITHOUT HYPERACTIVITY: ICD-10-CM

## 2023-12-12 RX ORDER — DEXTROAMPHETAMINE SACCHARATE, AMPHETAMINE ASPARTATE, DEXTROAMPHETAMINE SULFATE AND AMPHETAMINE SULFATE 7.5; 7.5; 7.5; 7.5 MG/1; MG/1; MG/1; MG/1
30 TABLET ORAL 2 TIMES DAILY
Qty: 60 TABLET | Refills: 0 | Status: SHIPPED | OUTPATIENT
Start: 2023-12-12 | End: 2024-01-12 | Stop reason: SDUPTHER

## 2023-12-12 NOTE — TELEPHONE ENCOUNTER
Received request via: Pharmacy    Was the patient seen in the last year in this department? Yes  8/25/23  Does the patient have an active prescription (recently filled or refills available) for medication(s) requested? No    Does the patient have assisted Plus and need 100 day supply (blood pressure, diabetes and cholesterol meds only)? Medication is not for cholesterol, blood pressure or diabetes

## 2024-01-03 DIAGNOSIS — F51.01 PRIMARY INSOMNIA: ICD-10-CM

## 2024-01-03 RX ORDER — CLONAZEPAM 1 MG/1
1 TABLET ORAL NIGHTLY
Qty: 30 TABLET | Refills: 0 | Status: SHIPPED | OUTPATIENT
Start: 2024-01-03 | End: 2024-02-12 | Stop reason: SDUPTHER

## 2024-01-12 DIAGNOSIS — F98.8 ATTENTION DEFICIT DISORDER (ADD) WITHOUT HYPERACTIVITY: ICD-10-CM

## 2024-01-12 RX ORDER — DEXTROAMPHETAMINE SACCHARATE, AMPHETAMINE ASPARTATE, DEXTROAMPHETAMINE SULFATE AND AMPHETAMINE SULFATE 7.5; 7.5; 7.5; 7.5 MG/1; MG/1; MG/1; MG/1
30 TABLET ORAL 2 TIMES DAILY
Qty: 60 TABLET | Refills: 0 | Status: SHIPPED | OUTPATIENT
Start: 2024-01-12 | End: 2024-02-12 | Stop reason: SDUPTHER

## 2024-01-12 NOTE — TELEPHONE ENCOUNTER
Received request via: Pharmacy  8/25/23lov  Was the patient seen in the last year in this department? Yes    Does the patient have an active prescription (recently filled or refills available) for medication(s) requested? No    Does the patient have care home Plus and need 100 day supply (blood pressure, diabetes and cholesterol meds only)? Patient does not have SCP

## 2024-01-19 ENCOUNTER — HOSPITAL ENCOUNTER (OUTPATIENT)
Dept: LAB | Facility: MEDICAL CENTER | Age: 29
End: 2024-01-19
Attending: FAMILY MEDICINE
Payer: COMMERCIAL

## 2024-01-19 ENCOUNTER — PATIENT MESSAGE (OUTPATIENT)
Dept: MEDICAL GROUP | Facility: LAB | Age: 29
End: 2024-01-19
Payer: COMMERCIAL

## 2024-01-19 DIAGNOSIS — Z00.00 WELL WOMAN EXAM WITHOUT GYNECOLOGICAL EXAM: ICD-10-CM

## 2024-01-19 DIAGNOSIS — E55.9 VITAMIN D DEFICIENCY: ICD-10-CM

## 2024-01-19 LAB
ALBUMIN SERPL BCP-MCNC: 4.4 G/DL (ref 3.2–4.9)
ALBUMIN/GLOB SERPL: 1.3 G/DL
ALP SERPL-CCNC: 49 U/L (ref 30–99)
ALT SERPL-CCNC: 11 U/L (ref 2–50)
ANION GAP SERPL CALC-SCNC: 12 MMOL/L (ref 7–16)
AST SERPL-CCNC: 22 U/L (ref 12–45)
BASOPHILS # BLD AUTO: 0.3 % (ref 0–1.8)
BASOPHILS # BLD: 0.02 K/UL (ref 0–0.12)
BILIRUB SERPL-MCNC: 0.4 MG/DL (ref 0.1–1.5)
BUN SERPL-MCNC: 15 MG/DL (ref 8–22)
CALCIUM ALBUM COR SERPL-MCNC: 8.7 MG/DL (ref 8.5–10.5)
CALCIUM SERPL-MCNC: 9 MG/DL (ref 8.5–10.5)
CHLORIDE SERPL-SCNC: 100 MMOL/L (ref 96–112)
CHOLEST SERPL-MCNC: 145 MG/DL (ref 100–199)
CO2 SERPL-SCNC: 22 MMOL/L (ref 20–33)
CREAT SERPL-MCNC: 0.69 MG/DL (ref 0.5–1.4)
EOSINOPHIL # BLD AUTO: 0.07 K/UL (ref 0–0.51)
EOSINOPHIL NFR BLD: 1.2 % (ref 0–6.9)
ERYTHROCYTE [DISTWIDTH] IN BLOOD BY AUTOMATED COUNT: 44.8 FL (ref 35.9–50)
EST. AVERAGE GLUCOSE BLD GHB EST-MCNC: 100 MG/DL
FASTING STATUS PATIENT QL REPORTED: NORMAL
GFR SERPLBLD CREATININE-BSD FMLA CKD-EPI: 120 ML/MIN/1.73 M 2
GLOBULIN SER CALC-MCNC: 3.4 G/DL (ref 1.9–3.5)
GLUCOSE SERPL-MCNC: 85 MG/DL (ref 65–99)
HBA1C MFR BLD: 5.1 % (ref 4–5.6)
HCT VFR BLD AUTO: 38.5 % (ref 37–47)
HDLC SERPL-MCNC: 75 MG/DL
HGB BLD-MCNC: 13.1 G/DL (ref 12–16)
IMM GRANULOCYTES # BLD AUTO: 0.01 K/UL (ref 0–0.11)
IMM GRANULOCYTES NFR BLD AUTO: 0.2 % (ref 0–0.9)
LDLC SERPL CALC-MCNC: 60 MG/DL
LYMPHOCYTES # BLD AUTO: 2.07 K/UL (ref 1–4.8)
LYMPHOCYTES NFR BLD: 34.1 % (ref 22–41)
MCH RBC QN AUTO: 31.2 PG (ref 27–33)
MCHC RBC AUTO-ENTMCNC: 34 G/DL (ref 32.2–35.5)
MCV RBC AUTO: 91.7 FL (ref 81.4–97.8)
MONOCYTES # BLD AUTO: 0.5 K/UL (ref 0–0.85)
MONOCYTES NFR BLD AUTO: 8.2 % (ref 0–13.4)
NEUTROPHILS # BLD AUTO: 3.4 K/UL (ref 1.82–7.42)
NEUTROPHILS NFR BLD: 56 % (ref 44–72)
NRBC # BLD AUTO: 0 K/UL
NRBC BLD-RTO: 0 /100 WBC (ref 0–0.2)
PLATELET # BLD AUTO: 294 K/UL (ref 164–446)
PMV BLD AUTO: 11.3 FL (ref 9–12.9)
POTASSIUM SERPL-SCNC: 4.2 MMOL/L (ref 3.6–5.5)
PROT SERPL-MCNC: 7.8 G/DL (ref 6–8.2)
RBC # BLD AUTO: 4.2 M/UL (ref 4.2–5.4)
SODIUM SERPL-SCNC: 134 MMOL/L (ref 135–145)
TRIGL SERPL-MCNC: 50 MG/DL (ref 0–149)
WBC # BLD AUTO: 6.1 K/UL (ref 4.8–10.8)

## 2024-01-19 PROCEDURE — 82306 VITAMIN D 25 HYDROXY: CPT

## 2024-01-19 PROCEDURE — 36415 COLL VENOUS BLD VENIPUNCTURE: CPT

## 2024-01-19 PROCEDURE — 85025 COMPLETE CBC W/AUTO DIFF WBC: CPT

## 2024-01-19 PROCEDURE — 80053 COMPREHEN METABOLIC PANEL: CPT

## 2024-01-19 PROCEDURE — 80061 LIPID PANEL: CPT

## 2024-01-19 PROCEDURE — 84443 ASSAY THYROID STIM HORMONE: CPT

## 2024-01-19 PROCEDURE — 83036 HEMOGLOBIN GLYCOSYLATED A1C: CPT

## 2024-01-20 LAB
25(OH)D3 SERPL-MCNC: 34 NG/ML (ref 30–100)
TSH SERPL DL<=0.005 MIU/L-ACNC: 2.3 UIU/ML (ref 0.38–5.33)

## 2024-01-22 ENCOUNTER — OFFICE VISIT (OUTPATIENT)
Dept: MEDICAL GROUP | Facility: LAB | Age: 29
End: 2024-01-22
Payer: COMMERCIAL

## 2024-01-22 VITALS
HEART RATE: 111 BPM | RESPIRATION RATE: 12 BRPM | TEMPERATURE: 97.3 F | BODY MASS INDEX: 25.4 KG/M2 | DIASTOLIC BLOOD PRESSURE: 72 MMHG | WEIGHT: 121 LBS | HEIGHT: 58 IN | OXYGEN SATURATION: 98 % | SYSTOLIC BLOOD PRESSURE: 122 MMHG

## 2024-01-22 DIAGNOSIS — F33.1 MODERATE EPISODE OF RECURRENT MAJOR DEPRESSIVE DISORDER (HCC): ICD-10-CM

## 2024-01-22 DIAGNOSIS — F90.0 ATTENTION DEFICIT HYPERACTIVITY DISORDER (ADHD), PREDOMINANTLY INATTENTIVE TYPE: ICD-10-CM

## 2024-01-22 PROCEDURE — 3074F SYST BP LT 130 MM HG: CPT | Performed by: FAMILY MEDICINE

## 2024-01-22 PROCEDURE — 99214 OFFICE O/P EST MOD 30 MIN: CPT | Performed by: FAMILY MEDICINE

## 2024-01-22 PROCEDURE — 3078F DIAST BP <80 MM HG: CPT | Performed by: FAMILY MEDICINE

## 2024-01-22 RX ORDER — BUPROPION HYDROCHLORIDE 150 MG/1
150 TABLET ORAL EVERY MORNING
Qty: 90 TABLET | Refills: 1 | Status: SHIPPED | OUTPATIENT
Start: 2024-01-22 | End: 2024-02-26 | Stop reason: SDUPTHER

## 2024-01-22 ASSESSMENT — PATIENT HEALTH QUESTIONNAIRE - PHQ9
5. POOR APPETITE OR OVEREATING: 2 - MORE THAN HALF THE DAYS
CLINICAL INTERPRETATION OF PHQ2 SCORE: 6
SUM OF ALL RESPONSES TO PHQ QUESTIONS 1-9: 23

## 2024-01-22 ASSESSMENT — FIBROSIS 4 INDEX: FIB4 SCORE: 0.65

## 2024-01-22 NOTE — LETTER
January 22, 2024        Isaalison Mcclure was seen in clinic today. Please excuse for work missed 1/19/24.                                Vivian Romano M.D.

## 2024-01-22 NOTE — PROGRESS NOTES
Subjective:     Chief Complaint   Patient presents with    Results    Medication Refill         HPI:   Isa presents today with follow up labs and refills.     Overall she is doing ok. Very fatigued. More feeling down.     Diet: up and down. Some over eating, then wont eat for a whole day.   Sleep: ok if she takes her meds, but then she doesn't sleep and this causes problems.     Has Mirena IUD as well now. Some spotting,and bleeding, placed October.   Has had before as well.   PDMP reviewed.     Depression Screening    Little interest or pleasure in doing things?  3 - nearly every day   Feeling down, depressed , or hopeless? 3 - nearly every day   Trouble falling or staying asleep, or sleeping too much?  3 - nearly every day   Feeling tired or having little energy?  3 - nearly every day   Poor appetite or overeating?  2 - more than half the days   Feeling bad about yourself - or that you are a failure or have let yourself or your family down? 3 - nearly every day   Trouble concentrating on things, such as reading the newspaper or watching television? 3 - nearly every day   Moving or speaking so slowly that other people could have noticed.  Or the opposite - being so fidgety or restless that you have been moving around a lot more than usual?  2 - more than half the days   Thoughts that you would be better off dead, or of hurting yourself?  1 - several days   Patient Health Questionnaire Score: 23       If depressive symptoms identified deferred to follow up visit unless specifically addressed in assesment and plan.    Interpretation of PHQ-9 Total Score   Score Severity   1-4 No Depression   5-9 Mild Depression   10-14 Moderate Depression   15-19 Moderately Severe Depression   20-27 Severe Depression        Current Outpatient Medications Ordered in Epic   Medication Sig Dispense Refill    buPROPion (WELLBUTRIN XL) 150 MG XL tablet Take 1 Tablet by mouth every morning. 90 Tablet 1    amphetamine-dextroamphetamine  "(ADDERALL) 30 MG tablet Take 1 Tablet by mouth 2 times a day for 30 days. 60 Tablet 0    clonazePAM (KLONOPIN) 1 MG Tab Take 1 Tablet by mouth every evening for 30 days. 30 Tablet 0    albuterol 108 (90 Base) MCG/ACT Aero Soln inhalation aerosol Inhale 2 Puffs by mouth every 6 hours as needed for Shortness of Breath. 8.5 g 3     No current Epic-ordered facility-administered medications on file.         ROS:  Gen: no fevers/chills, no changes in weight  Eyes: no changes in vision  ENT: no sore throat, no hearing loss, no bloody nose  Pulm: no sob, no cough  CV: no chest pain, no palpitations  GI: no nausea/vomiting, no diarrhea  : no dysuria  MSk: no myalgias  Skin: no rash  Neuro: no headaches, no numbness/tingling  Heme/Lymph: no easy bruising      Objective:     Exam:  /72 (BP Location: Right arm, Patient Position: Sitting, BP Cuff Size: Adult)   Pulse (!) 111   Temp 36.3 °C (97.3 °F)   Resp 12   Ht 1.473 m (4' 10\")   Wt 54.9 kg (121 lb)   SpO2 98%   BMI 25.29 kg/m²  Body mass index is 25.29 kg/m².    Gen: Alert and oriented, No apparent distress.  Neck: Neck is supple without lymphadenopathy.  Lungs: Normal effort, CTA bilaterally, no wheezes, rhonchi, or rales  CV: Regular rate and rhythm. No murmurs, rubs, or gallops.  Ext: No clubbing, cyanosis, edema.      Assessment & Plan:     28 y.o. female with the following -     1. Moderate episode of recurrent major depressive disorder (HCC)  More concerned with her mood symptoms at this point.  We did discuss the importance of routine daytime sunlight viewing as well as nutrition with regard to the mood in general.  Also discussed the benefit of starting back on an antidepressant.  She thinks that the Wellbutrin was thing that worked best in the past.  Will go ahead and go back on this.  We did discuss monitoring for any worsening anxiety.  She does not have her clonazepam she uses at nighttime for sleep.  - buPROPion (WELLBUTRIN XL) 150 MG XL tablet; " Take 1 Tablet by mouth every morning.  Dispense: 90 Tablet; Refill: 1    2. Attention deficit hyperactivity disorder (ADHD), predominantly inattentive type  Chronic, stable.  I think he is on a good dose right now with regard to her Adderall.  I think a lot of her concerns are more mood related.  Reviewed her labs actually all look really pretty good.  Minimally low sodium is not of concern at this time.  She will deftly continue working on her nutrition and hydration overall.  Will follow her up in 4 weeks.            Return in about 4 weeks (around 2/19/2024) for follow up meds.    Please note that this dictation was created using voice recognition software. I have made every reasonable attempt to correct obvious errors, but I expect that there are errors of grammar and possibly content that I did not discover before finalizing the note.

## 2024-02-01 ENCOUNTER — OFFICE VISIT (OUTPATIENT)
Dept: MEDICAL GROUP | Facility: LAB | Age: 29
End: 2024-02-01
Payer: COMMERCIAL

## 2024-02-01 VITALS
RESPIRATION RATE: 12 BRPM | DIASTOLIC BLOOD PRESSURE: 60 MMHG | BODY MASS INDEX: 25.82 KG/M2 | HEIGHT: 58 IN | SYSTOLIC BLOOD PRESSURE: 104 MMHG | TEMPERATURE: 98.8 F | OXYGEN SATURATION: 100 % | WEIGHT: 123 LBS | HEART RATE: 84 BPM

## 2024-02-01 DIAGNOSIS — J45.30 MILD PERSISTENT ASTHMA WITHOUT COMPLICATION: ICD-10-CM

## 2024-02-01 DIAGNOSIS — J06.9 UPPER RESPIRATORY TRACT INFECTION, UNSPECIFIED TYPE: ICD-10-CM

## 2024-02-01 PROCEDURE — 99213 OFFICE O/P EST LOW 20 MIN: CPT | Performed by: INTERNAL MEDICINE

## 2024-02-01 PROCEDURE — 3078F DIAST BP <80 MM HG: CPT | Performed by: INTERNAL MEDICINE

## 2024-02-01 PROCEDURE — 3074F SYST BP LT 130 MM HG: CPT | Performed by: INTERNAL MEDICINE

## 2024-02-01 RX ORDER — METHYLPREDNISOLONE 4 MG/1
TABLET ORAL
Qty: 21 TABLET | Refills: 0 | Status: SHIPPED | OUTPATIENT
Start: 2024-02-01 | End: 2024-02-26

## 2024-02-01 ASSESSMENT — FIBROSIS 4 INDEX: FIB4 SCORE: 0.65

## 2024-02-01 NOTE — LETTER
February 1, 2024        Patient: Isa Mcclure   YOB: 1995   Date of Visit: 2/1/2024           To Whom It May Concern:    It is my medical opinion that Isa Mcclure is suffering from a URI and needs to be off work February 1, 2024 and February 2, 2024, Isa may return to work February 5, 2024 without restriction.     If you have any questions or concerns, please don't hesitate to call.        Sincerely,          Nicolas Wood D.O.  Board Certified General Internal Medicine.      [address] [id number] [phone] (Phone)  [phone] (Fax)

## 2024-02-01 NOTE — PROGRESS NOTES
CC: Isa Mcclure is a 28 y.o. female is suffering from   Chief Complaint   Patient presents with    Cough     Congestion and sore throat x 3 days         SUBJECTIVE:  1. Upper respiratory tract infection, unspecified type  Upper respiratory tract infection x 3 days    2. Mild persistent asthma without complication  History of mild asthma        Past social, family, history: Single, works in pediatrics for [employer]  Social History     Tobacco Use    Smoking status: Former     Types: Cigarettes    Smokeless tobacco: Never   Vaping Use    Vaping Use: Every day   Substance Use Topics    Alcohol use: Not Currently    Drug use: Not Currently     Types: Marijuana     Comment: hx of meth and cannabis use, sober since 2015         MEDICATIONS:    Current Outpatient Medications:     buPROPion (WELLBUTRIN XL) 150 MG XL tablet, Take 1 Tablet by mouth every morning., Disp: 90 Tablet, Rfl: 1    amphetamine-dextroamphetamine (ADDERALL) 30 MG tablet, Take 1 Tablet by mouth 2 times a day for 30 days., Disp: 60 Tablet, Rfl: 0    clonazePAM (KLONOPIN) 1 MG Tab, Take 1 Tablet by mouth every evening for 30 days., Disp: 30 Tablet, Rfl: 0    albuterol 108 (90 Base) MCG/ACT Aero Soln inhalation aerosol, Inhale 2 Puffs by mouth every 6 hours as needed for Shortness of Breath., Disp: 8.5 g, Rfl: 3    PROBLEMS:  Patient Active Problem List    Diagnosis Date Noted    Insomnia 04/05/2022    Mild persistent asthma without complication 10/11/2021    Painful menstruation 08/14/2020    Heavy menses 07/30/2020    Amenorrhea 08/27/2019    Eczema 04/17/2019    Severe episode of recurrent major depressive disorder (HCC) 01/13/2017    PTSD (post-traumatic stress disorder) 01/13/2017       REVIEW OF SYSTEMS:  Gen.:  No Nausea, Vomiting, fever, Chills.  Heart: No chest pain.  Lungs:  No shortness of Breath.  Psychological: Huber unusual Anxiety depression     PHYSICAL EXAM   Constitutional: Alert, cooperative, not in acute  "distress.  Cardiovascular:  Rate Rhythm is regular without murmurs rubs clicks.     Thorax & Lungs: Clear to auscultation, no wheezing, rhonchi, or rales  HENT: Normocephalic, Atraumatic.  Eyes: PERRLA, EOMI, Conjunctiva normal.   Neck: Trachia is midline no swelling of the thyroid.   Lymphatic: No lymphadenopathy noted.   Neurologic: Alert & oriented x 3, cranial nerves II through XII are intact, Normal motor function, Normal sensory function, No focal deficits noted.   Psychiatric: Affect normal, Judgment normal, Mood normal.     VITAL SIGNS:/60 (BP Location: Left arm, Patient Position: Sitting, BP Cuff Size: Adult)   Pulse 84   Temp 37.1 °C (98.8 °F)   Resp 12   Ht 1.473 m (4' 10\")   Wt 55.8 kg (123 lb)   SpO2 100%   BMI 25.71 kg/m²     Labs: Reviewed    Assessment:                                                     Plan:    1. Upper respiratory tract infection, unspecified type  Upper respiratory tract infection etiology uncertain negative COVID, influenza AB, RSV  - methylPREDNISolone (MEDROL DOSEPAK) 4 MG Tablet Therapy Pack; As directed on the packaging label.  Dispense: 21 Tablet; Refill: 0    2. Mild persistent asthma without complication  Continue with Medrol  "

## 2024-02-12 DIAGNOSIS — F98.8 ATTENTION DEFICIT DISORDER (ADD) WITHOUT HYPERACTIVITY: ICD-10-CM

## 2024-02-12 DIAGNOSIS — F51.01 PRIMARY INSOMNIA: ICD-10-CM

## 2024-02-12 RX ORDER — DEXTROAMPHETAMINE SACCHARATE, AMPHETAMINE ASPARTATE, DEXTROAMPHETAMINE SULFATE AND AMPHETAMINE SULFATE 7.5; 7.5; 7.5; 7.5 MG/1; MG/1; MG/1; MG/1
30 TABLET ORAL 2 TIMES DAILY
Qty: 60 TABLET | Refills: 0 | Status: SHIPPED | OUTPATIENT
Start: 2024-02-12 | End: 2024-03-08 | Stop reason: SDUPTHER

## 2024-02-12 RX ORDER — CLONAZEPAM 1 MG/1
1 TABLET ORAL NIGHTLY
Qty: 30 TABLET | Refills: 0 | Status: SHIPPED | OUTPATIENT
Start: 2024-02-12 | End: 2024-03-08 | Stop reason: SDUPTHER

## 2024-02-12 NOTE — TELEPHONE ENCOUNTER
Received request via: Pharmacy  2/1/24lov  Was the patient seen in the last year in this department? Yes    Does the patient have an active prescription (recently filled or refills available) for medication(s) requested? No    Pharmacy Name: [facility]    Does the patient have assisted Plus and need 100 day supply (blood pressure, diabetes and cholesterol meds only)? Patient does not have SCP

## 2024-02-12 NOTE — TELEPHONE ENCOUNTER
Received request via: Pharmacy    Was the patient seen in the last year in this department? Yes  LOV : 2/1/2024   Does the patient have an active prescription (recently filled or refills available) for medication(s) requested? No    Pharmacy Name: [facility]     Does the patient have [facility] Plus and need 100 day supply (blood pressure, diabetes and cholesterol meds only)? Patient does not have SCP

## 2024-02-26 ENCOUNTER — OFFICE VISIT (OUTPATIENT)
Dept: MEDICAL GROUP | Facility: LAB | Age: 29
End: 2024-02-26
Payer: COMMERCIAL

## 2024-02-26 VITALS
RESPIRATION RATE: 16 BRPM | TEMPERATURE: 97.8 F | WEIGHT: 123 LBS | BODY MASS INDEX: 25.82 KG/M2 | SYSTOLIC BLOOD PRESSURE: 122 MMHG | DIASTOLIC BLOOD PRESSURE: 74 MMHG | HEART RATE: 88 BPM | OXYGEN SATURATION: 94 % | HEIGHT: 58 IN

## 2024-02-26 DIAGNOSIS — F33.1 MODERATE EPISODE OF RECURRENT MAJOR DEPRESSIVE DISORDER (HCC): ICD-10-CM

## 2024-02-26 PROCEDURE — 3078F DIAST BP <80 MM HG: CPT | Performed by: FAMILY MEDICINE

## 2024-02-26 PROCEDURE — 99213 OFFICE O/P EST LOW 20 MIN: CPT | Performed by: FAMILY MEDICINE

## 2024-02-26 PROCEDURE — 3074F SYST BP LT 130 MM HG: CPT | Performed by: FAMILY MEDICINE

## 2024-02-26 RX ORDER — BUPROPION HYDROCHLORIDE 150 MG/1
150 TABLET ORAL EVERY MORNING
Qty: 90 TABLET | Refills: 1 | Status: SHIPPED | OUTPATIENT
Start: 2024-02-26

## 2024-02-26 ASSESSMENT — FIBROSIS 4 INDEX: FIB4 SCORE: 0.65

## 2024-02-26 NOTE — PROGRESS NOTES
"Subjective:     Chief Complaint   Patient presents with    Follow-Up         HPI:   Isa presents today with follow-up depression.  Last visit she was put back on the Wellbutrin 150 XL.  Today she reports not really noticing much different.   Energy wise things are not good.   Stayed in bed all day yesterday.   No bad things, side effect wise.   She does have better awareness of her symptoms now.   Sleep: ok. Using clonazepam regularly.   Hasn't made any daylight sunshine changes yet.   Trying to open her blinds  bit more in the bedroom in the AM.         Current Outpatient Medications Ordered in Epic   Medication Sig Dispense Refill    buPROPion (WELLBUTRIN XL) 150 MG XL tablet Take 1 Tablet by mouth every morning. 90 Tablet 1    amphetamine-dextroamphetamine (ADDERALL) 30 MG tablet Take 1 Tablet by mouth 2 times a day for 30 days. 60 Tablet 0    clonazePAM (KLONOPIN) 1 MG Tab Take 1 Tablet by mouth every evening for 30 days. 30 Tablet 0    albuterol 108 (90 Base) MCG/ACT Aero Soln inhalation aerosol Inhale 2 Puffs by mouth every 6 hours as needed for Shortness of Breath. 8.5 g 3     No current [facility]-ordered facility-administered medications on file.         ROS:  Gen: no fevers/chills, no changes in weight  Eyes: no changes in vision  ENT: no sore throat, no hearing loss, no bloody nose  Pulm: no sob, no cough  CV: no chest pain, no palpitations  GI: no nausea/vomiting, no diarrhea  : no dysuria  MSk: no myalgias  Skin: no rash  Neuro: no headaches, no numbness/tingling  Heme/Lymph: no easy bruising      Objective:     Exam:  /74   Pulse 88   Temp 36.6 °C (97.8 °F) (Temporal)   Resp 16   Ht 1.473 m (4' 10\")   Wt 55.8 kg (123 lb)   SpO2 94%   BMI 25.71 kg/m²  Body mass index is 25.71 kg/m².    Gen: AAOx3, NAD, well appearing  HEENT: NCAT, EOMI, Nares patent, Mucosa moist  Resp: Normal chest wall rise and fall, not SOB, no tachypnea  Skin: no rash or abnormality of visible skin.   Psych: normal " speech, not slurred, good insight, affect full  MSK: Moves all four limbs equally and normally, gait normal        Assessment & Plan:     28 y.o. female with the following -   1. Moderate episode of recurrent major depressive disorder (HCC)  Discussed staying on her Wellbutrin at the same dose for right now.  Discussed different habits to try to get into with particularly things and sunlight throughout the day.  Also the weekends would like her to try to avoid sleeping in all day long.  If she can wake up to an alarm with a little bit later in the day this will be helpful but also getting out of bed and getting some sunshine at this time to be beneficial to.  She is sleeping okay in general.  Other medications seem to be going well.  May increase dosing next visit or change to something different.  She will let us know if there is anything that pops up in the interim.  - buPROPion (WELLBUTRIN XL) 150 MG XL tablet; Take 1 Tablet by mouth every morning.  Dispense: 90 Tablet; Refill: 1            Return in about 6 weeks (around 4/8/2024) for follow up meds, wellbutrin.    Please note that this dictation was created using voice recognition software. I have made every reasonable attempt to correct obvious errors, but I expect that there are errors of grammar and possibly content that I did not discover before finalizing the note.

## 2024-03-08 DIAGNOSIS — F51.01 PRIMARY INSOMNIA: ICD-10-CM

## 2024-03-08 DIAGNOSIS — F98.8 ATTENTION DEFICIT DISORDER (ADD) WITHOUT HYPERACTIVITY: ICD-10-CM

## 2024-03-08 RX ORDER — CLONAZEPAM 1 MG/1
1 TABLET ORAL NIGHTLY
Qty: 30 TABLET | Refills: 0 | Status: SHIPPED | OUTPATIENT
Start: 2024-03-08 | End: 2024-04-07

## 2024-03-08 RX ORDER — DEXTROAMPHETAMINE SACCHARATE, AMPHETAMINE ASPARTATE, DEXTROAMPHETAMINE SULFATE AND AMPHETAMINE SULFATE 7.5; 7.5; 7.5; 7.5 MG/1; MG/1; MG/1; MG/1
30 TABLET ORAL 2 TIMES DAILY
Qty: 60 TABLET | Refills: 0 | Status: SHIPPED | OUTPATIENT
Start: 2024-03-08 | End: 2024-04-07

## 2024-03-08 NOTE — TELEPHONE ENCOUNTER
Received request via: Pharmacy  2/26/24lov  Was the patient seen in the last year in this department? Yes    Does the patient have an active prescription (recently filled or refills available) for medication(s) requested? No    [facility]    Does the patient have snf Plus and need 100 day supply (blood pressure, diabetes and cholesterol meds only)? Patient does not have SCP

## 2024-04-01 ENCOUNTER — APPOINTMENT (OUTPATIENT)
Dept: MEDICAL GROUP | Facility: LAB | Age: 29
End: 2024-04-01
Payer: COMMERCIAL

## 2024-04-08 DIAGNOSIS — F98.8 ATTENTION DEFICIT DISORDER (ADD) WITHOUT HYPERACTIVITY: ICD-10-CM

## 2024-04-08 DIAGNOSIS — F33.1 MODERATE EPISODE OF RECURRENT MAJOR DEPRESSIVE DISORDER (HCC): ICD-10-CM

## 2024-04-08 DIAGNOSIS — F51.01 PRIMARY INSOMNIA: ICD-10-CM

## 2024-04-09 RX ORDER — DEXTROAMPHETAMINE SACCHARATE, AMPHETAMINE ASPARTATE, DEXTROAMPHETAMINE SULFATE AND AMPHETAMINE SULFATE 7.5; 7.5; 7.5; 7.5 MG/1; MG/1; MG/1; MG/1
30 TABLET ORAL 2 TIMES DAILY
Qty: 60 TABLET | Refills: 0 | Status: SHIPPED | OUTPATIENT
Start: 2024-04-09 | End: 2024-05-09

## 2024-04-09 RX ORDER — CLONAZEPAM 1 MG/1
1 TABLET ORAL NIGHTLY
Qty: 30 TABLET | Refills: 0 | Status: SHIPPED | OUTPATIENT
Start: 2024-04-09 | End: 2024-05-09

## 2024-04-09 RX ORDER — BUPROPION HYDROCHLORIDE 150 MG/1
150 TABLET ORAL EVERY MORNING
Qty: 90 TABLET | Refills: 1 | Status: SHIPPED | OUTPATIENT
Start: 2024-04-09

## 2024-04-09 NOTE — TELEPHONE ENCOUNTER
Received request via: Pharmacy    Was the patient seen in the last year in this department? Yes  2/26/24  Does the patient have an active prescription (recently filled or refills available) for medication(s) requested? No    [facility]    Does the patient have [facility] Plus and need 100 day supply (blood pressure, diabetes and cholesterol meds only)? Medication is not for cholesterol, blood pressure or diabetes

## 2024-04-15 ENCOUNTER — HOSPITAL ENCOUNTER (OUTPATIENT)
Facility: MEDICAL CENTER | Age: 29
End: 2024-04-15
Attending: FAMILY MEDICINE
Payer: COMMERCIAL

## 2024-04-15 ENCOUNTER — OFFICE VISIT (OUTPATIENT)
Dept: MEDICAL GROUP | Facility: LAB | Age: 29
End: 2024-04-15
Payer: COMMERCIAL

## 2024-04-15 VITALS
BODY MASS INDEX: 25.31 KG/M2 | TEMPERATURE: 99 F | HEIGHT: 58 IN | DIASTOLIC BLOOD PRESSURE: 66 MMHG | SYSTOLIC BLOOD PRESSURE: 124 MMHG | RESPIRATION RATE: 14 BRPM | WEIGHT: 120.6 LBS | HEART RATE: 114 BPM | OXYGEN SATURATION: 99 %

## 2024-04-15 DIAGNOSIS — N76.0 BV (BACTERIAL VAGINOSIS): ICD-10-CM

## 2024-04-15 DIAGNOSIS — F33.2 SEVERE EPISODE OF RECURRENT MAJOR DEPRESSIVE DISORDER, WITHOUT PSYCHOTIC FEATURES (HCC): ICD-10-CM

## 2024-04-15 DIAGNOSIS — B96.89 BV (BACTERIAL VAGINOSIS): ICD-10-CM

## 2024-04-15 DIAGNOSIS — N89.8 VAGINAL DISCHARGE: ICD-10-CM

## 2024-04-15 DIAGNOSIS — R30.0 DYSURIA: ICD-10-CM

## 2024-04-15 LAB
APPEARANCE UR: NORMAL
BILIRUB UR STRIP-MCNC: NEGATIVE MG/DL
COLOR UR AUTO: YELLOW
GLUCOSE UR STRIP.AUTO-MCNC: NEGATIVE MG/DL
KETONES UR STRIP.AUTO-MCNC: NEGATIVE MG/DL
LEUKOCYTE ESTERASE UR QL STRIP.AUTO: NEGATIVE
NITRITE UR QL STRIP.AUTO: NEGATIVE
PH UR STRIP.AUTO: 7 [PH] (ref 5–8)
PROT UR QL STRIP: NEGATIVE MG/DL
RBC UR QL AUTO: NEGATIVE
SP GR UR STRIP.AUTO: 1.02
UROBILINOGEN UR STRIP-MCNC: 1 MG/DL

## 2024-04-15 PROCEDURE — 81002 URINALYSIS NONAUTO W/O SCOPE: CPT | Performed by: FAMILY MEDICINE

## 2024-04-15 PROCEDURE — 87510 GARDNER VAG DNA DIR PROBE: CPT

## 2024-04-15 PROCEDURE — 99214 OFFICE O/P EST MOD 30 MIN: CPT | Performed by: FAMILY MEDICINE

## 2024-04-15 PROCEDURE — 3078F DIAST BP <80 MM HG: CPT | Performed by: FAMILY MEDICINE

## 2024-04-15 PROCEDURE — 87480 CANDIDA DNA DIR PROBE: CPT

## 2024-04-15 PROCEDURE — 3074F SYST BP LT 130 MM HG: CPT | Performed by: FAMILY MEDICINE

## 2024-04-15 PROCEDURE — 87660 TRICHOMONAS VAGIN DIR PROBE: CPT

## 2024-04-15 RX ORDER — METRONIDAZOLE 500 MG/1
500 TABLET ORAL
Qty: 14 TABLET | Refills: 0 | Status: SHIPPED | OUTPATIENT
Start: 2024-04-15 | End: 2024-04-22

## 2024-04-15 ASSESSMENT — FIBROSIS 4 INDEX: FIB4 SCORE: 0.65

## 2024-04-15 NOTE — PROGRESS NOTES
Subjective:     Chief Complaint   Patient presents with    Medication Follow-up    UTI     Cramping , burning , frequency , yellow discharge , odor x couple days          HPI:   Isa presents today with wellbutrin follow up and also possible pelvic or urinary infection.     She has been on Wellbutrin since late January.  We also discussed at last visit adjusting some habits and setting alarms and trying to get out of bed and seeing sunlight throughout the day. She is noticing some improvement on meds,  getting out of bed, seeing sunlight. Not isolating as much anymore.     Urine is dark, though trying to stay hydrated. Feels urge to go and can't, and then sometimes increased  urgency. Some pelvic cramping as well. Does have IUD in place, some spotting with this. More cramping of late, concern for IUD placement.   Mirena IUD placed in October 2023.        Current Outpatient Medications Ordered in Epic   Medication Sig Dispense Refill    buPROPion (WELLBUTRIN XL) 150 MG XL tablet Take 1 Tablet by mouth every morning. 90 Tablet 1    amphetamine-dextroamphetamine (ADDERALL) 30 MG tablet Take 1 Tablet by mouth 2 times a day for 30 days. 60 Tablet 0    clonazePAM (KLONOPIN) 1 MG Tab Take 1 Tablet by mouth every evening for 30 days. 30 Tablet 0    albuterol 108 (90 Base) MCG/ACT Aero Soln inhalation aerosol Inhale 2 Puffs by mouth every 6 hours as needed for Shortness of Breath. 8.5 g 3     No current [facility]-ordered facility-administered medications on file.         ROS:  Gen: no fevers/chills, no changes in weight  Eyes: no changes in vision  ENT: no sore throat, no hearing loss, no bloody nose  Pulm: no sob, no cough  CV: no chest pain, no palpitations  GI: no nausea/vomiting, no diarrhea  : no dysuria  MSk: no myalgias  Skin: no rash  Neuro: no headaches, no numbness/tingling  Heme/Lymph: no easy bruising      Objective:     Exam:  /66 (BP Location: Right arm, Patient Position: Sitting, BP Cuff Size: Adult)    "Pulse (!) 114   Temp 37.2 °C (99 °F)   Resp 14   Ht 1.473 m (4' 10\")   Wt 54.7 kg (120 lb 9.6 oz)   SpO2 99%   BMI 25.21 kg/m²  Body mass index is 25.21 kg/m².    Gen: AAOx3, NAD, well appearing  HEENT: NCAT, EOMI, Nares patent, Mucosa moist  Resp: Normal chest wall rise and fall, not SOB, no tachypnea  Skin: no rash or abnormality of visible skin.   Psych: normal speech, not slurred, good insight, affect full  MSK: Moves all four limbs equally and normally, gait normal  : Some mild external erythema to the labia majora and labia minora.  Some yellowish-white discharge present.  IUD strings visualized at os.  No masses otherwise.  No discharge from the os.  No blood present.  Swab collected.      Assessment & Plan:     28 y.o. female with the following -   1. Dysuria  Urine sample normal in clinic today.  - POCT Urinalysis    2. Vaginal discharge  Discussed appearance of of clinical exam and also symptoms.  Will go and start treating for bacterial vaginosis  - VAGINAL PATHOGENS DNA PANEL; Future    3. BV (bacterial vaginosis)    - metroNIDAZOLE (FLAGYL) 500 MG Tab; Take 1 Tablet by mouth 2 times a day for 7 days.  Dispense: 14 Tablet; Refill: 0    4. Severe episode of recurrent major depressive disorder, without psychotic features (HCC)  Discussed continuing her Wellbutrin as is.  Check she does seem much better today and in fact her PHQ-9 is improved from previously.  She will continue on her current meds as is.            No follow-ups on file.    Please note that this dictation was created using voice recognition software. I have made every reasonable attempt to correct obvious errors, but I expect that there are errors of grammar and possibly content that I did not discover before finalizing the note.        "

## 2024-04-16 DIAGNOSIS — N89.8 VAGINAL DISCHARGE: ICD-10-CM

## 2024-04-16 LAB
CANDIDA DNA VAG QL PROBE+SIG AMP: NEGATIVE
G VAGINALIS DNA VAG QL PROBE+SIG AMP: NEGATIVE
T VAGINALIS DNA VAG QL PROBE+SIG AMP: NEGATIVE

## 2024-05-06 DIAGNOSIS — F98.8 ATTENTION DEFICIT DISORDER (ADD) WITHOUT HYPERACTIVITY: ICD-10-CM

## 2024-05-06 RX ORDER — DEXTROAMPHETAMINE SACCHARATE, AMPHETAMINE ASPARTATE, DEXTROAMPHETAMINE SULFATE AND AMPHETAMINE SULFATE 7.5; 7.5; 7.5; 7.5 MG/1; MG/1; MG/1; MG/1
30 TABLET ORAL 2 TIMES DAILY
Qty: 60 TABLET | Refills: 0 | Status: SHIPPED | OUTPATIENT
Start: 2024-05-06 | End: 2024-06-05

## 2024-05-06 NOTE — TELEPHONE ENCOUNTER
Received request via: Pharmacy    Was the patient seen in the last year in this department? Yes  4/15/24  Does the patient have an active prescription (recently filled or refills available) for medication(s) requested? No    Pharmacy Name: [facility]    Does the patient have retirement Plus and need 100 day supply (blood pressure, diabetes and cholesterol meds only)? Medication is not for cholesterol, blood pressure or diabetes

## 2024-05-13 DIAGNOSIS — F51.01 PRIMARY INSOMNIA: ICD-10-CM

## 2024-05-14 RX ORDER — CLONAZEPAM 1 MG/1
1 TABLET ORAL NIGHTLY
Qty: 30 TABLET | Refills: 0 | Status: SHIPPED | OUTPATIENT
Start: 2024-05-14 | End: 2024-06-13

## 2024-05-14 NOTE — TELEPHONE ENCOUNTER
Received request via: Patient    Was the patient seen in the last year in this department? Yes 4/15/24    Does the patient have an active prescription (recently filled or refills available) for medication(s) requested? No    Pharmacy Name: [facility]    Does the patient have [facility] Plus and need 100 day supply (blood pressure, diabetes and cholesterol meds only)? Patient does not have SCP

## 2024-05-27 ENCOUNTER — OFFICE VISIT (OUTPATIENT)
Dept: URGENT CARE | Facility: CLINIC | Age: 29
End: 2024-05-27
Payer: COMMERCIAL

## 2024-05-27 VITALS
RESPIRATION RATE: 24 BRPM | SYSTOLIC BLOOD PRESSURE: 132 MMHG | BODY MASS INDEX: 25.2 KG/M2 | DIASTOLIC BLOOD PRESSURE: 84 MMHG | HEIGHT: 59 IN | OXYGEN SATURATION: 100 % | WEIGHT: 125 LBS | TEMPERATURE: 99.8 F | HEART RATE: 142 BPM

## 2024-05-27 DIAGNOSIS — R05.1 ACUTE COUGH: ICD-10-CM

## 2024-05-27 DIAGNOSIS — U07.1 COVID-19 VIRUS INFECTION: ICD-10-CM

## 2024-05-27 LAB
FLUAV RNA SPEC QL NAA+PROBE: NEGATIVE
FLUBV RNA SPEC QL NAA+PROBE: NEGATIVE
RSV RNA SPEC QL NAA+PROBE: NEGATIVE
SARS-COV-2 RNA RESP QL NAA+PROBE: POSITIVE

## 2024-05-27 PROCEDURE — 3079F DIAST BP 80-89 MM HG: CPT | Performed by: PHYSICIAN ASSISTANT

## 2024-05-27 PROCEDURE — 99213 OFFICE O/P EST LOW 20 MIN: CPT | Performed by: PHYSICIAN ASSISTANT

## 2024-05-27 PROCEDURE — 0241U POCT CEPHEID COV-2, FLU A/B, RSV - PCR: CPT | Performed by: PHYSICIAN ASSISTANT

## 2024-05-27 PROCEDURE — 3075F SYST BP GE 130 - 139MM HG: CPT | Performed by: PHYSICIAN ASSISTANT

## 2024-05-27 RX ORDER — BENZONATATE 100 MG/1
100 CAPSULE ORAL 3 TIMES DAILY PRN
Qty: 21 CAPSULE | Refills: 0 | Status: SHIPPED | OUTPATIENT
Start: 2024-05-27

## 2024-05-27 RX ORDER — DEXTROMETHORPHAN HYDROBROMIDE AND PROMETHAZINE HYDROCHLORIDE 15; 6.25 MG/5ML; MG/5ML
5 SYRUP ORAL NIGHTLY PRN
Qty: 118 ML | Refills: 0 | Status: SHIPPED | OUTPATIENT
Start: 2024-05-27

## 2024-05-27 ASSESSMENT — FIBROSIS 4 INDEX: FIB4 SCORE: 0.65

## 2024-05-27 NOTE — LETTER
May 27, 2024         Patient: Isa Mcclure   YOB: 1995   Date of Visit: 5/27/2024           To Whom it May Concern:    Isa Mcclure was seen in my clinic on 5/27/2024. Please excuse from work tomorrow.     If you have any questions or concerns, please don't hesitate to call.        Sincerely,           Danny Osborne P.A.-C.  Electronically Signed

## 2024-05-27 NOTE — PROGRESS NOTES
"Subjective:   Isa Mcclure is a 28 y.o. female who presents for Pharyngitis (X 3 day/ fever/ congestion/ cough/ SOB )      HPI  The patient presents to the Urgent Care with complaints of flulike symptoms onset 2 mornings ago.  She had fever and chills the first day which resolved.  Still has bodyaches.  Reports of a productive cough, shortness of breath, nasal congestion, runny nose, and sore throat.  Reports of associated fatigue.  Denies any chest pain, vomiting, diarrhea. Tolerating some fluids. Nonsmoker. Father had similar symptoms last night.     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   Constitutional:       General: She is not in acute distress.     Appearance: Normal appearance. She is not ill-appearing or toxic-appearing.   HENT:      Head: Normocephalic.      Mouth/Throat:      Mouth: Mucous membranes are moist.      Pharynx: Oropharynx is clear. Uvula midline. Posterior oropharyngeal erythema (trace) present. No pharyngeal swelling, oropharyngeal exudate or uvula swelling.      Tonsils: No tonsillar exudate or tonsillar abscesses.   Eyes:      Conjunctiva/sclera: Conjunctivae normal.      Pupils: Pupils are equal, round, and reactive to light.   Cardiovascular:      Rate and Rhythm: Normal rate and regular rhythm.      Heart sounds: Normal heart sounds.   Pulmonary:      Effort: Pulmonary effort is normal. No respiratory distress.      Breath sounds: Normal breath sounds. No wheezing, " rhonchi or rales.   Musculoskeletal:      Cervical back: Neck supple. No rigidity.   Lymphadenopathy:      Cervical: No cervical adenopathy.   Skin:     General: Skin is warm and dry.   Neurological:      General: No focal deficit present.      Mental Status: She is alert and oriented to person, place, and time.   Psychiatric:         Mood and Affect: Mood normal.         Behavior: Behavior normal.       Results for orders placed or performed in visit on 05/27/24   POCT CEPHEID COV-2, FLU A/B, RSV - PCR   Result Value Ref Range    SARS-CoV-2 by PCR Positive (A) Negative, Invalid    Influenza virus A RNA Negative Negative, Invalid    Influenza virus B, PCR Negative Negative, Invalid    RSV, PCR Negative Negative, Invalid       Diagnosis and associated orders:     1. COVID-19 virus infection  - POCT CEPHEID COV-2, FLU A/B, RSV - PCR    2. Acute cough  - POCT CEPHEID COV-2, FLU A/B, RSV - PCR  - benzonatate (TESSALON) 100 MG Cap; Take 1 Capsule by mouth 3 times a day as needed for Cough.  Dispense: 21 Capsule; Refill: 0  - promethazine-dextromethorphan (PROMETHAZINE-DM) 6.25-15 MG/5ML syrup; Take 5 mL by mouth at bedtime as needed for Cough.  Dispense: 118 mL; Refill: 0       Comments/MDM:       Symptomatic and supportive care:   Plenty of oral hydration and rest   Over the counter cough suppressant as directed.  Tylenol or ibuprofen for pain and fever as directed.   Warm salt water gargles for sore throat, soft foods, cool liquids.   Saline nasal spray and Flonase  Infection control measures at home. Stay away from people, Hand washing, covering sneeze/cough, disinfect surfaces.   Remain home from work, school, and other populated environments.    Overall, the patient is well-appearing. They are not hypoxic, afebrile, and a normal pulmonary exam.         I personally reviewed prior external notes and test results pertinent to today's visit. Pathogenesis of diagnosis discussed including typical length and natural  progression. Supportive care, natural history, differential diagnoses, and indications for immediate follow-up discussed. Patient expresses understanding and agrees to plan. Patient denies any other questions or concerns.     Follow-up with the primary care physician for recheck, reevaluation, and consideration of further management.    Please note that this dictation was created using voice recognition software. I have made a reasonable attempt to correct obvious errors, but I expect that there are errors of grammar and possibly content that I did not discover before finalizing the note.    This note was electronically signed by Danny Osborne PA-C

## 2024-06-03 DIAGNOSIS — F98.8 ATTENTION DEFICIT DISORDER (ADD) WITHOUT HYPERACTIVITY: ICD-10-CM

## 2024-06-03 RX ORDER — DEXTROAMPHETAMINE SACCHARATE, AMPHETAMINE ASPARTATE, DEXTROAMPHETAMINE SULFATE AND AMPHETAMINE SULFATE 7.5; 7.5; 7.5; 7.5 MG/1; MG/1; MG/1; MG/1
30 TABLET ORAL 2 TIMES DAILY
Qty: 60 TABLET | Refills: 0 | Status: SHIPPED | OUTPATIENT
Start: 2024-06-03 | End: 2024-06-04 | Stop reason: SDUPTHER

## 2024-06-04 ENCOUNTER — PATIENT MESSAGE (OUTPATIENT)
Dept: MEDICAL GROUP | Facility: LAB | Age: 29
End: 2024-06-04
Payer: COMMERCIAL

## 2024-06-04 DIAGNOSIS — F98.8 ATTENTION DEFICIT DISORDER (ADD) WITHOUT HYPERACTIVITY: ICD-10-CM

## 2024-06-04 RX ORDER — DEXTROAMPHETAMINE SACCHARATE, AMPHETAMINE ASPARTATE, DEXTROAMPHETAMINE SULFATE AND AMPHETAMINE SULFATE 7.5; 7.5; 7.5; 7.5 MG/1; MG/1; MG/1; MG/1
30 TABLET ORAL 2 TIMES DAILY
Qty: 60 TABLET | Refills: 0 | Status: SHIPPED | OUTPATIENT
Start: 2024-06-04 | End: 2024-07-04

## 2024-06-09 ENCOUNTER — OFFICE VISIT (OUTPATIENT)
Dept: URGENT CARE | Facility: CLINIC | Age: 29
End: 2024-06-09
Payer: COMMERCIAL

## 2024-06-09 VITALS
DIASTOLIC BLOOD PRESSURE: 80 MMHG | HEART RATE: 124 BPM | RESPIRATION RATE: 18 BRPM | SYSTOLIC BLOOD PRESSURE: 132 MMHG | HEIGHT: 59 IN | WEIGHT: 122 LBS | TEMPERATURE: 97.7 F | OXYGEN SATURATION: 96 % | BODY MASS INDEX: 24.6 KG/M2

## 2024-06-09 DIAGNOSIS — H10.33 ACUTE BACTERIAL CONJUNCTIVITIS OF BOTH EYES: ICD-10-CM

## 2024-06-09 DIAGNOSIS — J02.9 VIRAL PHARYNGITIS: ICD-10-CM

## 2024-06-09 PROCEDURE — 99213 OFFICE O/P EST LOW 20 MIN: CPT

## 2024-06-09 PROCEDURE — 3075F SYST BP GE 130 - 139MM HG: CPT

## 2024-06-09 PROCEDURE — 0241U POCT CEPHEID COV-2, FLU A/B, RSV - PCR: CPT

## 2024-06-09 PROCEDURE — 87651 STREP A DNA AMP PROBE: CPT

## 2024-06-09 PROCEDURE — 3079F DIAST BP 80-89 MM HG: CPT

## 2024-06-09 RX ORDER — LIDOCAINE HYDROCHLORIDE 20 MG/ML
15 SOLUTION OROPHARYNGEAL EVERY 6 HOURS
Qty: 300 ML | Refills: 0 | Status: SHIPPED | OUTPATIENT
Start: 2024-06-09 | End: 2024-06-14

## 2024-06-09 RX ORDER — DEXAMETHASONE SODIUM PHOSPHATE 10 MG/ML
10 INJECTION INTRAMUSCULAR; INTRAVENOUS ONCE
Status: COMPLETED | OUTPATIENT
Start: 2024-06-09 | End: 2024-06-09

## 2024-06-09 RX ORDER — OFLOXACIN 3 MG/ML
2 SOLUTION/ DROPS OPHTHALMIC 4 TIMES DAILY
Qty: 5 ML | Refills: 0 | Status: SHIPPED | OUTPATIENT
Start: 2024-06-09 | End: 2024-06-16

## 2024-06-09 RX ORDER — LIDOCAINE HYDROCHLORIDE 20 MG/ML
15 SOLUTION OROPHARYNGEAL PRN
Qty: 1200 ML | Refills: 0 | Status: SHIPPED | OUTPATIENT
Start: 2024-06-09 | End: 2024-06-09

## 2024-06-09 RX ADMIN — DEXAMETHASONE SODIUM PHOSPHATE 10 MG: 10 INJECTION INTRAMUSCULAR; INTRAVENOUS at 12:43

## 2024-06-09 ASSESSMENT — VISUAL ACUITY: OU: 1

## 2024-06-09 ASSESSMENT — FIBROSIS 4 INDEX: FIB4 SCORE: 0.65

## 2024-06-09 NOTE — PROGRESS NOTES
"Chief Complaint   Patient presents with    Sore Throat     X 4 days, cough, nasal and chest congestion, BL pink eye, BL otalgia         Subjective:   HISTORY OF PRESENT ILLNESS: Isa Mcclure is a 28 y.o. female who presents for nasal congestion, cough, sore throat and bilateral ear pain x 4 days.  She now has green discharge in both of her eyes.  She did have a covid infection about 2 weeks ago but reports her symptoms had fully resolved from that illness  Patient denies fevers, or SOB    Medications, Allergies, current problem list, Social and Family history reviewed today in Epic.     Objective:     /80   Pulse (!) 124   Temp 36.5 °C (97.7 °F)   Resp 18   Ht 1.499 m (4' 11\")   Wt 55.3 kg (122 lb)   SpO2 96%     Physical Exam  Vitals reviewed.   Constitutional:       Appearance: Normal appearance. She is not toxic-appearing.   HENT:      Head:      Jaw: No trismus.      Right Ear: Tympanic membrane normal.      Left Ear: Tympanic membrane normal.      Nose: Rhinorrhea present.      Mouth/Throat:      Mouth: Mucous membranes are moist.      Pharynx: Uvula midline. Posterior oropharyngeal erythema present. No pharyngeal swelling, oropharyngeal exudate or uvula swelling.      Tonsils: No tonsillar exudate or tonsillar abscesses. 1+ on the right. 1+ on the left.      Comments: No muffled voice, trismus, unilateral deviation of the uvula, soft palate fullness or edema. No oral airway compromise, or drooling noted.   Eyes:      General: Lids are normal. Vision grossly intact.         Right eye: Discharge present.         Left eye: Discharge present.     Conjunctiva/sclera:      Right eye: Right conjunctiva is injected.      Left eye: Left conjunctiva is injected.   Cardiovascular:      Rate and Rhythm: Normal rate and regular rhythm.      Heart sounds: Normal heart sounds.   Pulmonary:      Effort: Pulmonary effort is normal. No respiratory distress.      Breath sounds: Normal breath sounds. No " decreased breath sounds or wheezing.   Musculoskeletal:      Cervical back: Full passive range of motion without pain and neck supple.   Skin:     General: Skin is warm and dry.   Neurological:      Mental Status: She is alert and oriented to person, place, and time.   Psychiatric:         Mood and Affect: Mood normal.          Assessment/Plan:     Diagnosis and associated orders    I personally reviewed prior external notes and test results pertinent to today's visit.     1. Viral pharyngitis  POCT CEPHEID GROUP A STREP - PCR    POCT CoV-2, Flu A/B, RSV by PCR    dexamethasone (Decadron) injection (check route below) 10 mg    lidocaine (XYLOCAINE) 2 % Solution    DISCONTINUED: lidocaine (XYLOCAINE) 2 % Solution      2. Acute bacterial conjunctivitis of both eyes  ofloxacin (OCUFLOX) 0.3 % Solution          Results for orders placed or performed in visit on 06/09/24   POCT CEPHEID GROUP A STREP - PCR   Result Value Ref Range    POC Group A Strep, PCR Not Detected Not Detected, Invalid   POCT CoV-2, Flu A/B, RSV by PCR   Result Value Ref Range    SARS-CoV-2 by PCR Negative Negative, Invalid    Influenza virus A RNA Negative Negative, Invalid    Influenza virus B, PCR Negative Negative, Invalid    RSV, PCR Negative Negative, Invalid      IMPRESSION:  Pt has stable vital signs and no red flag symptoms or exam findings identified.   Informed pt that symptoms are consistent with a viral illness and are self limiting.  Informed that no antibiotics are indicated at this time.  Educated on duration of illness and indications to RTC.  Recommended supportive therapies and preferred OTC medications for symptomatic relief .      Differential diagnosis discussed. Pt was Educated on red flag symptoms. Pt has been Instructed to return to Urgent Care or nearest Emergency Department if symptoms fail to improve, for any change in condition, further concerns, or new concerning symptoms. Patient states understanding of the plan of care  and discharge instructions.  They are discharged in stable condition.         Please note that this dictation was created using voice recognition software. I have made a reasonable attempt to correct obvious errors, but I expect that there are errors of grammar and possibly content that I did not discover before finalizing the note.    This note was electronically signed by MILAN Vance

## 2024-06-11 DIAGNOSIS — F51.01 PRIMARY INSOMNIA: ICD-10-CM

## 2024-06-11 RX ORDER — CLONAZEPAM 1 MG/1
1 TABLET ORAL NIGHTLY
Qty: 30 TABLET | Refills: 0 | Status: SHIPPED | OUTPATIENT
Start: 2024-06-11 | End: 2024-07-11

## 2024-06-14 ENCOUNTER — OFFICE VISIT (OUTPATIENT)
Dept: MEDICAL GROUP | Facility: LAB | Age: 29
End: 2024-06-14
Payer: COMMERCIAL

## 2024-06-14 VITALS
TEMPERATURE: 98.7 F | RESPIRATION RATE: 20 BRPM | OXYGEN SATURATION: 98 % | SYSTOLIC BLOOD PRESSURE: 118 MMHG | WEIGHT: 122 LBS | HEART RATE: 124 BPM | HEIGHT: 59 IN | BODY MASS INDEX: 24.6 KG/M2 | DIASTOLIC BLOOD PRESSURE: 82 MMHG

## 2024-06-14 DIAGNOSIS — J45.21 MILD INTERMITTENT ASTHMA WITH EXACERBATION: ICD-10-CM

## 2024-06-14 DIAGNOSIS — J01.01 ACUTE RECURRENT MAXILLARY SINUSITIS: ICD-10-CM

## 2024-06-14 DIAGNOSIS — J45.909 MILD ASTHMA WITHOUT COMPLICATION, UNSPECIFIED WHETHER PERSISTENT: ICD-10-CM

## 2024-06-14 PROCEDURE — 3079F DIAST BP 80-89 MM HG: CPT | Performed by: FAMILY MEDICINE

## 2024-06-14 PROCEDURE — 3074F SYST BP LT 130 MM HG: CPT | Performed by: FAMILY MEDICINE

## 2024-06-14 PROCEDURE — 99213 OFFICE O/P EST LOW 20 MIN: CPT | Performed by: FAMILY MEDICINE

## 2024-06-14 RX ORDER — PREDNISONE 20 MG/1
40 TABLET ORAL DAILY
Qty: 10 TABLET | Refills: 0 | Status: SHIPPED | OUTPATIENT
Start: 2024-06-14 | End: 2024-06-19

## 2024-06-14 RX ORDER — AMOXICILLIN AND CLAVULANATE POTASSIUM 875; 125 MG/1; MG/1
1 TABLET, FILM COATED ORAL 2 TIMES DAILY
Qty: 14 TABLET | Refills: 0 | Status: SHIPPED | OUTPATIENT
Start: 2024-06-14 | End: 2024-06-21

## 2024-06-14 RX ORDER — ALBUTEROL SULFATE 90 UG/1
2 AEROSOL, METERED RESPIRATORY (INHALATION) EVERY 6 HOURS PRN
Qty: 8.5 G | Refills: 3 | Status: SHIPPED | OUTPATIENT
Start: 2024-06-14

## 2024-06-14 ASSESSMENT — FIBROSIS 4 INDEX: FIB4 SCORE: 0.65

## 2024-06-14 NOTE — PROGRESS NOTES
"Subjective:     Chief Complaint   Patient presents with    Cough     Wet; 8x days     Congestion     Chest;8x day            HPI:   Isa presents today with cough and congestion for at least the last 8 days.  She was seen in urgent care 5 days ago and tested negative for viral infection.  She was given eyedrops and lidocaine for sore throat.  2 weeks ago was diagnosed with COVID. Did have some improvement initially after COVID, then woke with sore throat and congestion, productive cough, fever again, bloody nose.   Reports using her albuterol inhaler much more often lately.  Feeling a little bit tight and short of breath.        Current Outpatient Medications Ordered in Epic   Medication Sig Dispense Refill    clonazePAM (KLONOPIN) 1 MG Tab Take 1 Tablet by mouth every evening for 30 days. 30 Tablet 0    lidocaine (XYLOCAINE) 2 % Solution Take 15 mL by mouth every 6 hours for 5 days. Gargle; may spit or swallow 300 mL 0    ofloxacin (OCUFLOX) 0.3 % Solution Administer 2 Drops into the left eye 4 times a day for 7 days. 5 mL 0    amphetamine-dextroamphetamine (ADDERALL) 30 MG tablet Take 1 Tablet by mouth 2 times a day for 30 days. 60 Tablet 0    buPROPion (WELLBUTRIN XL) 150 MG XL tablet Take 1 Tablet by mouth every morning. 90 Tablet 1    albuterol 108 (90 Base) MCG/ACT Aero Soln inhalation aerosol Inhale 2 Puffs by mouth every 6 hours as needed for Shortness of Breath. 8.5 g 3     No current [facility]-ordered facility-administered medications on file.         ROS:  Gen: no fevers/chills, no changes in weight    CV: no chest pain, no palpitations  GI: no nausea/vomiting, no diarrhea  : no dysuria  MSk: no myalgias  Skin: no rash  Neuro: no headaches, no numbness/tingling  Heme/Lymph: no easy bruising      Objective:     Exam:  /82 (BP Location: Left arm, Patient Position: Sitting, BP Cuff Size: Adult)   Pulse (!) 124   Temp 37.1 °C (98.7 °F) (Temporal)   Resp 20   Ht 1.499 m (4' 11\")   Wt 55.3 kg (122 lb)  "  SpO2 98%   BMI 24.64 kg/m²  Body mass index is 24.64 kg/m².    Gen: Alert and oriented, No apparent distress.  HEENT: NCAT, EOMI, TMs are both retracted with a lot of purulent fluid posteriorly.  Oropharynx is mildly erythematous without any exudates.  Dentition is good.  Neck: Neck is supple without lymphadenopathy.  Lungs: Normal effort, CTA bilaterally, no wheezes, rhonchi, or rales  CV: Regular rate and rhythm. No murmurs, rubs, or gallops.  Ext: No clubbing, cyanosis, edema.      Assessment & Plan:     28 y.o. female with the following -     1. Mild intermittent asthma with exacerbation  Discussed possible asthma exacerbation with recent sinusitis.  Will go and get her on a steroid burst to hopefully calm this down.  She did get some cough medicine from urgent care which she has been using it is helpful.  - predniSONE (DELTASONE) 20 MG Tab; Take 2 Tablets by mouth every day for 5 days.  Dispense: 10 Tablet; Refill: 0    2. Acute recurrent maxillary sinusitis  Will go and treat her sinus infection with some Augmentin which would also cover any lower lung issues as well.  - amoxicillin-clavulanate (AUGMENTIN) 875-125 MG Tab; Take 1 Tablet by mouth 2 times a day for 7 days.  Dispense: 14 Tablet; Refill: 0    3. Mild asthma without complication, unspecified whether persistent    - albuterol 108 (90 Base) MCG/ACT Aero Soln inhalation aerosol; Inhale 2 Puffs every 6 hours as needed for Shortness of Breath.  Dispense: 8.5 g; Refill: 3            No follow-ups on file.    Please note that this dictation was created using voice recognition software. I have made every reasonable attempt to correct obvious errors, but I expect that there are errors of grammar and possibly content that I did not discover before finalizing the note.

## 2024-06-14 NOTE — LETTER
June 14, 2024        Isa Mcclure was seen in clinic today for illness. Please excuse for work missed.                                 Vivian Romano M.D.

## 2024-06-23 SDOH — ECONOMIC STABILITY: INCOME INSECURITY: HOW HARD IS IT FOR YOU TO PAY FOR THE VERY BASICS LIKE FOOD, HOUSING, MEDICAL CARE, AND HEATING?: NOT HARD AT ALL

## 2024-06-23 SDOH — HEALTH STABILITY: PHYSICAL HEALTH: ON AVERAGE, HOW MANY DAYS PER WEEK DO YOU ENGAGE IN MODERATE TO STRENUOUS EXERCISE (LIKE A BRISK WALK)?: 4 DAYS

## 2024-06-23 SDOH — ECONOMIC STABILITY: FOOD INSECURITY: WITHIN THE PAST 12 MONTHS, THE FOOD YOU BOUGHT JUST DIDN'T LAST AND YOU DIDN'T HAVE MONEY TO GET MORE.: NEVER TRUE

## 2024-06-23 SDOH — ECONOMIC STABILITY: HOUSING INSECURITY: IN THE LAST 12 MONTHS, HOW MANY PLACES HAVE YOU LIVED?: 1

## 2024-06-23 SDOH — ECONOMIC STABILITY: INCOME INSECURITY: IN THE LAST 12 MONTHS, WAS THERE A TIME WHEN YOU WERE NOT ABLE TO PAY THE MORTGAGE OR RENT ON TIME?: NO

## 2024-06-23 SDOH — ECONOMIC STABILITY: FOOD INSECURITY: WITHIN THE PAST 12 MONTHS, YOU WORRIED THAT YOUR FOOD WOULD RUN OUT BEFORE YOU GOT MONEY TO BUY MORE.: NEVER TRUE

## 2024-06-23 SDOH — HEALTH STABILITY: PHYSICAL HEALTH: ON AVERAGE, HOW MANY MINUTES DO YOU ENGAGE IN EXERCISE AT THIS LEVEL?: 20 MIN

## 2024-06-23 ASSESSMENT — SOCIAL DETERMINANTS OF HEALTH (SDOH)
HOW OFTEN DO YOU HAVE SIX OR MORE DRINKS ON ONE OCCASION: NEVER
HOW OFTEN DO YOU GET TOGETHER WITH FRIENDS OR RELATIVES?: NEVER
HOW MANY DRINKS CONTAINING ALCOHOL DO YOU HAVE ON A TYPICAL DAY WHEN YOU ARE DRINKING: PATIENT DOES NOT DRINK
IN A TYPICAL WEEK, HOW MANY TIMES DO YOU TALK ON THE PHONE WITH FAMILY, FRIENDS, OR NEIGHBORS?: MORE THAN THREE TIMES A WEEK
HOW OFTEN DO YOU ATTEND CHURCH OR RELIGIOUS SERVICES?: NEVER
HOW OFTEN DO YOU GET TOGETHER WITH FRIENDS OR RELATIVES?: NEVER
HOW OFTEN DO YOU ATTEND CHURCH OR RELIGIOUS SERVICES?: NEVER
IN A TYPICAL WEEK, HOW MANY TIMES DO YOU TALK ON THE PHONE WITH FAMILY, FRIENDS, OR NEIGHBORS?: MORE THAN THREE TIMES A WEEK
HOW OFTEN DO YOU ATTENT MEETINGS OF THE CLUB OR ORGANIZATION YOU BELONG TO?: NEVER
IN THE PAST 12 MONTHS, HAS THE ELECTRIC, GAS, OIL, OR WATER COMPANY THREATENED TO SHUT OFF SERVICE IN YOUR HOME?: NO
HOW OFTEN DO YOU HAVE A DRINK CONTAINING ALCOHOL: NEVER
DO YOU BELONG TO ANY CLUBS OR ORGANIZATIONS SUCH AS CHURCH GROUPS UNIONS, FRATERNAL OR ATHLETIC GROUPS, OR SCHOOL GROUPS?: NO
HOW OFTEN DO YOU ATTENT MEETINGS OF THE CLUB OR ORGANIZATION YOU BELONG TO?: NEVER
WITHIN THE PAST 12 MONTHS, YOU WORRIED THAT YOUR FOOD WOULD RUN OUT BEFORE YOU GOT THE MONEY TO BUY MORE: NEVER TRUE
DO YOU BELONG TO ANY CLUBS OR ORGANIZATIONS SUCH AS CHURCH GROUPS UNIONS, FRATERNAL OR ATHLETIC GROUPS, OR SCHOOL GROUPS?: NO
HOW HARD IS IT FOR YOU TO PAY FOR THE VERY BASICS LIKE FOOD, HOUSING, MEDICAL CARE, AND HEATING?: NOT HARD AT ALL

## 2024-06-23 ASSESSMENT — LIFESTYLE VARIABLES
HOW OFTEN DO YOU HAVE A DRINK CONTAINING ALCOHOL: NEVER
HOW OFTEN DO YOU HAVE SIX OR MORE DRINKS ON ONE OCCASION: NEVER
HOW MANY STANDARD DRINKS CONTAINING ALCOHOL DO YOU HAVE ON A TYPICAL DAY: PATIENT DOES NOT DRINK
AUDIT-C TOTAL SCORE: 0
SKIP TO QUESTIONS 9-10: 1

## 2024-06-24 ENCOUNTER — APPOINTMENT (OUTPATIENT)
Dept: MEDICAL GROUP | Facility: LAB | Age: 29
End: 2024-06-24
Payer: COMMERCIAL

## 2024-06-24 VITALS
BODY MASS INDEX: 24.19 KG/M2 | TEMPERATURE: 98 F | HEIGHT: 59 IN | HEART RATE: 114 BPM | OXYGEN SATURATION: 99 % | SYSTOLIC BLOOD PRESSURE: 124 MMHG | RESPIRATION RATE: 18 BRPM | WEIGHT: 120 LBS | DIASTOLIC BLOOD PRESSURE: 70 MMHG

## 2024-06-24 DIAGNOSIS — Z00.00 WELL WOMAN EXAM WITHOUT GYNECOLOGICAL EXAM: ICD-10-CM

## 2024-06-24 DIAGNOSIS — K59.00 CONSTIPATION, UNSPECIFIED CONSTIPATION TYPE: ICD-10-CM

## 2024-06-24 DIAGNOSIS — Z23 NEED FOR VACCINATION: ICD-10-CM

## 2024-06-24 PROCEDURE — 90651 9VHPV VACCINE 2/3 DOSE IM: CPT | Performed by: FAMILY MEDICINE

## 2024-06-24 PROCEDURE — 3078F DIAST BP <80 MM HG: CPT | Performed by: FAMILY MEDICINE

## 2024-06-24 PROCEDURE — 99395 PREV VISIT EST AGE 18-39: CPT | Mod: 25 | Performed by: FAMILY MEDICINE

## 2024-06-24 PROCEDURE — 90471 IMMUNIZATION ADMIN: CPT | Performed by: FAMILY MEDICINE

## 2024-06-24 PROCEDURE — 3074F SYST BP LT 130 MM HG: CPT | Performed by: FAMILY MEDICINE

## 2024-06-24 RX ORDER — LEVONORGESTREL 52 MG/1
1 INTRAUTERINE DEVICE INTRAUTERINE ONCE
COMMUNITY

## 2024-06-24 ASSESSMENT — FIBROSIS 4 INDEX: FIB4 SCORE: 0.68

## 2024-06-24 NOTE — PROGRESS NOTES
"Subjective:     Chief Complaint   Patient presents with    Annual Exam         HPI:   Isa presents today for annual.   Has some questions about allergies. Has had some mosquito bites recently that got swollen. She does have a lot of Gi symptoms with certain foods.     Diet: Tries to avoid dairy most of the time. But suffering from more constipation, bloating, etc. Does try to eat yogurt. Using a pre and probiotic gummy since April or so, hasn't changed much.    Doesn't have gallbladder anymore. Worse constipation in the last 5 years. Lots of stomach issues on her dads side of the family.   Exercise: active regularly.   Sleep: ok with meds. Too much caffeine in the afternoon.   Pap: February 2023, normal.  Due 2026  LMP: Has IUD          Current Outpatient Medications Ordered in Epic   Medication Sig Dispense Refill    albuterol 108 (90 Base) MCG/ACT Aero Soln inhalation aerosol Inhale 2 Puffs every 6 hours as needed for Shortness of Breath. 8.5 g 3    clonazePAM (KLONOPIN) 1 MG Tab Take 1 Tablet by mouth every evening for 30 days. 30 Tablet 0    amphetamine-dextroamphetamine (ADDERALL) 30 MG tablet Take 1 Tablet by mouth 2 times a day for 30 days. 60 Tablet 0     No current Epic-ordered facility-administered medications on file.         ROS:  Gen: no fevers/chills, no changes in weight  Eyes: no changes in vision  ENT: no sore throat, no hearing loss, no bloody nose  Pulm: no sob, no cough  CV: no chest pain, no palpitations  GI: no nausea/vomiting, no diarrhea  : no dysuria  MSk: no myalgias  Skin: no rash  Neuro: no headaches, no numbness/tingling  Heme/Lymph: no easy bruising      Objective:     Exam:  /70   Pulse (!) 114   Temp 36.7 °C (98 °F)   Resp 18   Ht 1.499 m (4' 11\")   Wt 54.4 kg (120 lb)   SpO2 99%   BMI 24.24 kg/m²  Body mass index is 24.24 kg/m².    Gen: Alert and oriented, No apparent distress.  Neck: Neck is supple without lymphadenopathy.  Lungs: Normal effort, CTA bilaterally, no " wheezes, rhonchi, or rales  CV: Regular rate and rhythm. No murmurs, rubs, or gallops.  Ext: No clubbing, cyanosis, edema.  Skin: Very large hives at the legs and arms.  Consistent with mosquito bites.  Does not appear to be infected just inflamed.  Abd: Bowel sounds positive, soft, diffusely tender very mild.  No masses appreciated.    Assessment & Plan:     29 y.o. female with the following -     1. Well woman exam without gynecological exam  Reviewed her recent labs in January.  Discussed diet and exercise recommendations.  I do recommend a fiber supplement.  She can swap out her probiotic Gummies which is a plain fiber supplement and work on the dietary fiber as well.  We did discuss the difficulty in food-based allergy or intolerance testing.    2. Constipation, unspecified constipation type  Get her referred to GI for further constipation workup.  Prefer to not have her on laxatives chronically if she does not need to.  We did discuss increasing fiber as mentioned above.  - Referral to Gastroenterology    3. Need for vaccination  This will finish her series.  - 9VHPV Vaccine 2-3 Dose (GARDASIL 9)          No follow-ups on file.    Please note that this dictation was created using voice recognition software. I have made every reasonable attempt to correct obvious errors, but I expect that there are errors of grammar and possibly content that I did not discover before finalizing the note.

## 2024-07-01 DIAGNOSIS — F98.8 ATTENTION DEFICIT DISORDER (ADD) WITHOUT HYPERACTIVITY: ICD-10-CM

## 2024-07-02 RX ORDER — DEXTROAMPHETAMINE SACCHARATE, AMPHETAMINE ASPARTATE, DEXTROAMPHETAMINE SULFATE AND AMPHETAMINE SULFATE 7.5; 7.5; 7.5; 7.5 MG/1; MG/1; MG/1; MG/1
30 TABLET ORAL 2 TIMES DAILY
Qty: 60 TABLET | Refills: 0 | Status: SHIPPED | OUTPATIENT
Start: 2024-07-02 | End: 2024-07-29 | Stop reason: SDUPTHER

## 2024-07-15 DIAGNOSIS — F51.01 PRIMARY INSOMNIA: ICD-10-CM

## 2024-07-15 RX ORDER — CLONAZEPAM 1 MG/1
1 TABLET ORAL NIGHTLY
Qty: 30 TABLET | Refills: 0 | Status: SHIPPED | OUTPATIENT
Start: 2024-07-15 | End: 2024-08-14

## 2024-07-29 DIAGNOSIS — F98.8 ATTENTION DEFICIT DISORDER (ADD) WITHOUT HYPERACTIVITY: ICD-10-CM

## 2024-07-29 RX ORDER — DEXTROAMPHETAMINE SACCHARATE, AMPHETAMINE ASPARTATE, DEXTROAMPHETAMINE SULFATE AND AMPHETAMINE SULFATE 7.5; 7.5; 7.5; 7.5 MG/1; MG/1; MG/1; MG/1
30 TABLET ORAL 2 TIMES DAILY
Qty: 60 TABLET | Refills: 0 | Status: SHIPPED | OUTPATIENT
Start: 2024-07-29 | End: 2024-08-28

## 2024-08-15 DIAGNOSIS — F51.01 PRIMARY INSOMNIA: ICD-10-CM

## 2024-08-16 RX ORDER — CLONAZEPAM 1 MG/1
1 TABLET ORAL NIGHTLY
Qty: 30 TABLET | Refills: 0 | Status: SHIPPED | OUTPATIENT
Start: 2024-08-16 | End: 2024-09-15

## 2024-08-16 NOTE — TELEPHONE ENCOUNTER
Received request via: Pharmacy    Was the patient seen in the last year in this department? Yes6/24/24    Does the patient have an active prescription (recently filled or refills available) for medication(s) requested? No    Pharmacy Name: [facility]    Does the patient have long-term Plus and need 100-day supply? (This applies to ALL medications)

## 2024-08-27 DIAGNOSIS — F98.8 ATTENTION DEFICIT DISORDER (ADD) WITHOUT HYPERACTIVITY: ICD-10-CM

## 2024-08-27 RX ORDER — DEXTROAMPHETAMINE SACCHARATE, AMPHETAMINE ASPARTATE, DEXTROAMPHETAMINE SULFATE AND AMPHETAMINE SULFATE 7.5; 7.5; 7.5; 7.5 MG/1; MG/1; MG/1; MG/1
30 TABLET ORAL 2 TIMES DAILY
Qty: 60 TABLET | Refills: 0 | Status: SHIPPED | OUTPATIENT
Start: 2024-08-27 | End: 2024-09-26

## 2024-08-27 NOTE — TELEPHONE ENCOUNTER
Received request via: Pharmacy    Was the patient seen in the last year in this department? Yes    Does the patient have an active prescription (recently filled or refills available) for medication(s) requested? No    [facility]    Does the patient have jail Plus and need 100-day supply? (This applies to ALL medications)

## 2024-09-07 RX ORDER — LIDOCAINE HYDROCHLORIDE 20 MG/ML
15 SOLUTION OROPHARYNGEAL PRN
Qty: 1,200 ML | Refills: 0 | OUTPATIENT
Start: 2024-09-07

## 2024-09-20 DIAGNOSIS — F51.01 PRIMARY INSOMNIA: ICD-10-CM

## 2024-09-20 DIAGNOSIS — F98.8 ATTENTION DEFICIT DISORDER (ADD) WITHOUT HYPERACTIVITY: ICD-10-CM

## 2024-09-20 RX ORDER — CLONAZEPAM 1 MG/1
1 TABLET ORAL NIGHTLY
Qty: 30 TABLET | Refills: 0 | Status: SHIPPED | OUTPATIENT
Start: 2024-09-20 | End: 2024-10-20

## 2024-09-20 RX ORDER — DEXTROAMPHETAMINE SACCHARATE, AMPHETAMINE ASPARTATE, DEXTROAMPHETAMINE SULFATE AND AMPHETAMINE SULFATE 7.5; 7.5; 7.5; 7.5 MG/1; MG/1; MG/1; MG/1
30 TABLET ORAL 2 TIMES DAILY
Qty: 60 TABLET | Refills: 0 | Status: SHIPPED | OUTPATIENT
Start: 2024-09-20 | End: 2024-10-20

## 2024-09-20 NOTE — TELEPHONE ENCOUNTER
Received request via: Pharmacy    Was the patient seen in the last year in this department? Yes    Does the patient have an active prescription (recently filled or refills available) for medication(s) requested? No    Pharmacy Name: [facility]    Does the patient have senior living Plus and need 100-day supply? (This applies to ALL medications)

## 2024-10-21 DIAGNOSIS — F51.01 PRIMARY INSOMNIA: ICD-10-CM

## 2024-10-21 DIAGNOSIS — F98.8 ATTENTION DEFICIT DISORDER (ADD) WITHOUT HYPERACTIVITY: ICD-10-CM

## 2024-10-21 RX ORDER — CLONAZEPAM 1 MG/1
1 TABLET ORAL NIGHTLY
Qty: 30 TABLET | Refills: 0 | Status: SHIPPED | OUTPATIENT
Start: 2024-10-21 | End: 2024-11-20

## 2024-10-21 RX ORDER — DEXTROAMPHETAMINE SACCHARATE, AMPHETAMINE ASPARTATE, DEXTROAMPHETAMINE SULFATE AND AMPHETAMINE SULFATE 7.5; 7.5; 7.5; 7.5 MG/1; MG/1; MG/1; MG/1
30 TABLET ORAL 2 TIMES DAILY
Qty: 60 TABLET | Refills: 0 | Status: SHIPPED | OUTPATIENT
Start: 2024-10-21 | End: 2024-11-20

## 2024-11-18 ENCOUNTER — OFFICE VISIT (OUTPATIENT)
Dept: MEDICAL GROUP | Facility: LAB | Age: 29
End: 2024-11-18
Payer: COMMERCIAL

## 2024-11-18 VITALS
RESPIRATION RATE: 16 BRPM | HEART RATE: 102 BPM | WEIGHT: 132 LBS | TEMPERATURE: 98.3 F | DIASTOLIC BLOOD PRESSURE: 70 MMHG | BODY MASS INDEX: 26.61 KG/M2 | OXYGEN SATURATION: 99 % | HEIGHT: 59 IN | SYSTOLIC BLOOD PRESSURE: 108 MMHG

## 2024-11-18 DIAGNOSIS — F33.2 SEVERE EPISODE OF RECURRENT MAJOR DEPRESSIVE DISORDER, WITHOUT PSYCHOTIC FEATURES (HCC): ICD-10-CM

## 2024-11-18 DIAGNOSIS — F98.8 ATTENTION DEFICIT DISORDER (ADD) WITHOUT HYPERACTIVITY: ICD-10-CM

## 2024-11-18 DIAGNOSIS — K59.00 CONSTIPATION, UNSPECIFIED CONSTIPATION TYPE: ICD-10-CM

## 2024-11-18 PROCEDURE — 99214 OFFICE O/P EST MOD 30 MIN: CPT | Performed by: FAMILY MEDICINE

## 2024-11-18 PROCEDURE — 3078F DIAST BP <80 MM HG: CPT | Performed by: FAMILY MEDICINE

## 2024-11-18 PROCEDURE — 3074F SYST BP LT 130 MM HG: CPT | Performed by: FAMILY MEDICINE

## 2024-11-18 RX ORDER — DEXTROAMPHETAMINE SACCHARATE, AMPHETAMINE ASPARTATE, DEXTROAMPHETAMINE SULFATE AND AMPHETAMINE SULFATE 5; 5; 5; 5 MG/1; MG/1; MG/1; MG/1
20 TABLET ORAL 2 TIMES DAILY
Qty: 60 TABLET | Refills: 0 | Status: SHIPPED | OUTPATIENT
Start: 2024-11-18 | End: 2024-12-18

## 2024-11-18 RX ORDER — BUPROPION HYDROCHLORIDE 150 MG/1
150 TABLET ORAL EVERY MORNING
Qty: 90 TABLET | Refills: 1 | Status: SHIPPED | OUTPATIENT
Start: 2024-11-18

## 2024-11-18 ASSESSMENT — PATIENT HEALTH QUESTIONNAIRE - PHQ9
SUM OF ALL RESPONSES TO PHQ QUESTIONS 1-9: 27
5. POOR APPETITE OR OVEREATING: 3 - NEARLY EVERY DAY
CLINICAL INTERPRETATION OF PHQ2 SCORE: 6

## 2024-11-18 ASSESSMENT — FIBROSIS 4 INDEX: FIB4 SCORE: 0.68

## 2024-11-18 NOTE — PROGRESS NOTES
Subjective:     Chief Complaint   Patient presents with    Depression    Referral Needed         HPI:   Isa presents today for severe depression. For the last month or so.     This may have been triggered by her ex- getting out of [employer].  She has not had any contact with him which is what she requested but has become more depressed with time.  She also reports she stopped taking her Wellbutrin she is not sure why.  She just felt like she was doing fine and did not need it.  She is realizing that she does need something on board long-term.  She has not seen psychiatry in quite a long time.  She does have some issues where she feels like the Adderall wears off or she gets a withdrawal from it but also she is wondering if the dose is too high because she has been a little bit jittery her to hyped up throughout the day.        Current Outpatient Medications Ordered in Epic   Medication Sig Dispense Refill    clonazePAM (KLONOPIN) 1 MG Tab Take 1 Tablet by mouth every evening for 30 days. 30 Tablet 0    amphetamine-dextroamphetamine (ADDERALL) 30 MG tablet Take 1 Tablet by mouth 2 times a day for 30 days. 60 Tablet 0    albuterol 108 (90 Base) MCG/ACT Aero Soln inhalation aerosol Inhale 2 Puffs every 6 hours as needed for Shortness of Breath. 8.5 g 3    methylPREDNISolone (MEDROL DOSEPAK) 4 MG Tablet Therapy Pack Follow schedule on package instructions. (Patient not taking: Reported on 11/18/2024) 21 Tablet 0    levonorgestrel (MIRENA, 52 MG,) 20 MCG/DAY IUD 1 Each by Intrauterine route one time.       No current [facility]-ordered facility-administered medications on file.         ROS:  Gen: no fevers/chills, no changes in weight  Eyes: no changes in vision  ENT: no sore throat, no hearing loss, no bloody nose  Pulm: no sob, no cough  CV: no chest pain, no palpitations  GI: no nausea/vomiting, no diarrhea  : no dysuria  MSk: no myalgias  Skin: no rash  Neuro: no headaches, no numbness/tingling  Heme/Lymph: no easy  "bruising      Objective:     Exam:  /70   Pulse (!) 102   Temp 36.8 °C (98.3 °F)   Resp 16   Ht 1.499 m (4' 11\")   Wt 59.9 kg (132 lb)   SpO2 99%   BMI 26.66 kg/m²  Body mass index is 26.66 kg/m².    Gen: AAOx3, NAD, well appearing  HEENT: NCAT, EOMI, Nares patent, Mucosa moist  Resp: Normal chest wall rise and fall, not SOB, no tachypnea  Skin: no rash or abnormality of visible skin.   Psych: normal speech, not slurred, good insight, affect flat  MSK: Moves all four limbs equally and normally, gait normal        Assessment & Plan:     29 y.o. female with the following -     1. Attention deficit disorder (ADD) without hyperactivity  Discussed adjusting her Adderall and going down on the dosage to see if we still get some benefit without overstimulation.  With the Wellbutrin on board I am hoping this will help a little bit as well so she can use a lower dose.  - amphetamine-dextroamphetamine (ADDERALL) 20 MG Tab; Take 1 Tablet by mouth 2 times a day for 30 days.  Dispense: 60 Tablet; Refill: 0  - Referral to Behavioral Health    2. Severe episode of recurrent major depressive disorder, without psychotic features (HCC)  We discussed referral to psychiatry.  She has been on and off multiple medications in the past and I think it is time to get a another opinion as to what she is on and what she might be better off with.  She is agreeable to this.  - buPROPion (WELLBUTRIN XL) 150 MG XL tablet; Take 1 Tablet by mouth every morning.  Dispense: 90 Tablet; Refill: 1  - Referral to Behavioral Health    3. Constipation, unspecified constipation type  She was supposed to get in with GI consultants but because she is a new patient she was put on a waiting list.  Will try to get her referred to digestive health for a sooner appointment.  - Referral to Gastroenterology            No follow-ups on file.    Please note that this dictation was created using voice recognition software. I have made every reasonable " attempt to correct obvious errors, but I expect that there are errors of grammar and possibly content that I did not discover before finalizing the note.

## 2024-11-22 DIAGNOSIS — F51.01 PRIMARY INSOMNIA: ICD-10-CM

## 2024-11-22 RX ORDER — CLONAZEPAM 1 MG/1
1 TABLET ORAL NIGHTLY
Qty: 30 TABLET | Refills: 0 | Status: SHIPPED | OUTPATIENT
Start: 2024-11-22 | End: 2024-12-22

## 2024-11-22 NOTE — TELEPHONE ENCOUNTER
Received request via: Patient    Was the patient seen in the last year in this department? Yes    Does the patient have an active prescription (recently filled or refills available) for medication(s) requested? No    Ph[facility]    Does the patient have longterm Plus and need 100-day supply? (This applies to ALL medications) Patient does not have SCP

## 2024-12-10 ENCOUNTER — OFFICE VISIT (OUTPATIENT)
Dept: MEDICAL GROUP | Facility: LAB | Age: 29
End: 2024-12-10
Payer: COMMERCIAL

## 2024-12-10 VITALS
HEART RATE: 106 BPM | TEMPERATURE: 98.5 F | RESPIRATION RATE: 16 BRPM | SYSTOLIC BLOOD PRESSURE: 110 MMHG | BODY MASS INDEX: 26.21 KG/M2 | HEIGHT: 59 IN | WEIGHT: 130 LBS | DIASTOLIC BLOOD PRESSURE: 68 MMHG | OXYGEN SATURATION: 99 %

## 2024-12-10 DIAGNOSIS — R06.02 SOB (SHORTNESS OF BREATH): ICD-10-CM

## 2024-12-10 LAB
FLUAV RNA SPEC QL NAA+PROBE: NEGATIVE
FLUBV RNA SPEC QL NAA+PROBE: NEGATIVE
RSV RNA SPEC QL NAA+PROBE: NEGATIVE
SARS-COV-2 RNA RESP QL NAA+PROBE: NEGATIVE

## 2024-12-10 PROCEDURE — 3074F SYST BP LT 130 MM HG: CPT | Performed by: FAMILY MEDICINE

## 2024-12-10 PROCEDURE — 99213 OFFICE O/P EST LOW 20 MIN: CPT | Performed by: FAMILY MEDICINE

## 2024-12-10 PROCEDURE — 0241U POCT CEPHEID COV-2, FLU A/B, RSV - PCR: CPT | Performed by: FAMILY MEDICINE

## 2024-12-10 PROCEDURE — 3078F DIAST BP <80 MM HG: CPT | Performed by: FAMILY MEDICINE

## 2024-12-10 RX ORDER — AZITHROMYCIN 250 MG/1
250 TABLET, FILM COATED ORAL DAILY
Qty: 6 TABLET | Refills: 0 | Status: SHIPPED | OUTPATIENT
Start: 2024-12-10 | End: 2024-12-15

## 2024-12-10 RX ORDER — PREDNISONE 20 MG/1
40 TABLET ORAL DAILY
Qty: 10 TABLET | Refills: 0 | Status: SHIPPED | OUTPATIENT
Start: 2024-12-10 | End: 2024-12-15

## 2024-12-10 ASSESSMENT — FIBROSIS 4 INDEX: FIB4 SCORE: 0.68

## 2024-12-10 NOTE — LETTER
December 10, 2024        Isa Mcclure was seen in clinic for illness. Please excuse for work missed 12/10/24-12/11/24. May try to return 12/12/24 if feeling well.                                 Vivian Romano M.D.

## 2024-12-10 NOTE — PROGRESS NOTES
"Subjective:     Chief Complaint   Patient presents with    Sore Throat    Body Aches    Shortness of Breath         HPI:   Isa presents today for sore throat, shortness of breath, body aches which started last night.  Sore throat started first and then the dry cough started up as well.  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  She has been feeling more heaviness in the chest and shortness of breath.  She did try her albuterol inhaler this AM without much benefit.  Not sure if she has had any fevers.  She does feel chills in general.  Feels more sore and bodyaches as well.  No rash noted.  No ear pain at this point.  No headaches.        Current Outpatient Medications Ordered in Epic   Medication Sig Dispense Refill    clonazePAM (KLONOPIN) 1 MG Tab Take 1 Tablet by mouth every evening for 30 days. 30 Tablet 0    buPROPion (WELLBUTRIN XL) 150 MG XL tablet Take 1 Tablet by mouth every morning. 90 Tablet 1    amphetamine-dextroamphetamine (ADDERALL) 20 MG Tab Take 1 Tablet by mouth 2 times a day for 30 days. 60 Tablet 0    levonorgestrel (MIRENA, 52 MG,) 20 MCG/DAY IUD 1 Each by Intrauterine route one time.      albuterol 108 (90 Base) MCG/ACT Aero Soln inhalation aerosol Inhale 2 Puffs every 6 hours as needed for Shortness of Breath. 8.5 g 3     No current [facility]-ordered facility-administered medications on file.         ROS:  Gen: no fevers/chills, no changes in weight  Eyes: no changes in vision    CV: no chest pain, no palpitations  GI: no nausea/vomiting, no diarrhea  : no dysuria  Skin: no rash  Neuro: no headaches, no numbness/tingling  Heme/Lymph: no easy bruising      Objective:     Exam:  /68   Pulse (!) 106   Temp 36.9 °C (98.5 °F)   Resp 16   Ht 1.499 m (4' 11\")   Wt 59 kg (130 lb)   SpO2 99%   BMI 26.26 kg/m²  Body mass index is 26.26 kg/m².    Gen: Alert and oriented, No apparent distress.  HEENT: NCAT, EOMI, TMs are slightly retracted bilaterally.  No " erythema.  Oropharynx is mildly erythematous without any exudates.  No tonsillar hypertrophy is noted.  Dentition is good.  Neck: Neck is supple without lymphadenopathy.  Lungs: Normal effort, CTA bilaterally, no wheezes, rhonchi, or rales  CV: Regular rate and rhythm. No murmurs, rubs, or gallops.  Ext: No clubbing, cyanosis, edema.      Assessment & Plan:     29 y.o. female with the following -     1. SOB (shortness of breath)  Discussed possibility for viral infection and asthma exacerbation.  She did not have much benefit with her albuterol inhaler but we may need to think about treating this with prednisone course depending upon what her viral testing shows.  We did discuss use of antivirals if she comes back with positive influenza and/or COVID.  Discussed possibility for steroid burst and antibiotic if we think this is more asthma exacerbation.  Will let her know when her wrist come back  - POCT CoV-2, Flu A/B, RSV by PCR            No follow-ups on file.    Please note that this dictation was created using voice recognition software. I have made every reasonable attempt to correct obvious errors, but I expect that there are errors of grammar and possibly content that I did not discover before finalizing the note.

## 2024-12-16 ENCOUNTER — OFFICE VISIT (OUTPATIENT)
Dept: MEDICAL GROUP | Facility: LAB | Age: 29
End: 2024-12-16
Payer: COMMERCIAL

## 2024-12-16 VITALS
DIASTOLIC BLOOD PRESSURE: 70 MMHG | BODY MASS INDEX: 26.21 KG/M2 | SYSTOLIC BLOOD PRESSURE: 118 MMHG | HEIGHT: 59 IN | TEMPERATURE: 98.1 F | RESPIRATION RATE: 16 BRPM | WEIGHT: 130 LBS | HEART RATE: 116 BPM | OXYGEN SATURATION: 93 %

## 2024-12-16 DIAGNOSIS — F98.8 ATTENTION DEFICIT DISORDER (ADD) WITHOUT HYPERACTIVITY: ICD-10-CM

## 2024-12-16 DIAGNOSIS — Z23 NEED FOR VACCINATION: ICD-10-CM

## 2024-12-16 DIAGNOSIS — J01.00 SUBACUTE MAXILLARY SINUSITIS: ICD-10-CM

## 2024-12-16 PROCEDURE — 3074F SYST BP LT 130 MM HG: CPT | Performed by: FAMILY MEDICINE

## 2024-12-16 PROCEDURE — 3078F DIAST BP <80 MM HG: CPT | Performed by: FAMILY MEDICINE

## 2024-12-16 PROCEDURE — 90656 IIV3 VACC NO PRSV 0.5 ML IM: CPT

## 2024-12-16 PROCEDURE — 90471 IMMUNIZATION ADMIN: CPT

## 2024-12-16 PROCEDURE — 99214 OFFICE O/P EST MOD 30 MIN: CPT | Mod: 25 | Performed by: FAMILY MEDICINE

## 2024-12-16 RX ORDER — DEXTROAMPHETAMINE SACCHARATE, AMPHETAMINE ASPARTATE, DEXTROAMPHETAMINE SULFATE AND AMPHETAMINE SULFATE 7.5; 7.5; 7.5; 7.5 MG/1; MG/1; MG/1; MG/1
30 TABLET ORAL 2 TIMES DAILY
Qty: 60 TABLET | Refills: 0 | Status: SHIPPED | OUTPATIENT
Start: 2024-12-16 | End: 2025-01-15

## 2024-12-16 ASSESSMENT — FIBROSIS 4 INDEX: FIB4 SCORE: 0.68

## 2024-12-16 NOTE — PROGRESS NOTES
"Subjective:     Chief Complaint   Patient presents with    Medication Follow-up         HPI:   Isa presents today for follow up wellbutrin but also persistent sinus issues and cough.  Tried some Sudafed this morning which helped a little bit.  She is still coughing and feeling fairly poorly.  Did not feel any different on azithromycin and prednisone burst.  Prior viral testing was all negative.    We did go down on the adderall to start wellbutrin. Having more problems with crashing mid day, not as much effect with adderall.     We have tried adderall xr previously which didn't help at all.         Current Outpatient Medications Ordered in Epic   Medication Sig Dispense Refill    clonazePAM (KLONOPIN) 1 MG Tab Take 1 Tablet by mouth every evening for 30 days. 30 Tablet 0    buPROPion (WELLBUTRIN XL) 150 MG XL tablet Take 1 Tablet by mouth every morning. 90 Tablet 1    amphetamine-dextroamphetamine (ADDERALL) 20 MG Tab Take 1 Tablet by mouth 2 times a day for 30 days. 60 Tablet 0    levonorgestrel (MIRENA, 52 MG,) 20 MCG/DAY IUD 1 Each by Intrauterine route one time.      albuterol 108 (90 Base) MCG/ACT Aero Soln inhalation aerosol Inhale 2 Puffs every 6 hours as needed for Shortness of Breath. 8.5 g 3     No current [facility]-ordered facility-administered medications on file.         ROS:  Gen: no fevers/chills, no changes in weight  Eyes: no changes in vision  ENT: no sore throat, no hearing loss, no bloody nose  Pulm: no sob, no cough  CV: no chest pain, no palpitations  GI: no nausea/vomiting, no diarrhea  : no dysuria  MSk: no myalgias  Skin: no rash  Neuro: no headaches, no numbness/tingling  Heme/Lymph: no easy bruising      Objective:     Exam:  /70   Pulse (!) 116   Temp 36.7 °C (98.1 °F)   Resp 16   Ht 1.499 m (4' 11\")   Wt 59 kg (130 lb)   SpO2 93%   BMI 26.26 kg/m²  Body mass index is 26.26 kg/m².    Gen: Alert and oriented, No apparent distress.  Neck: Neck is supple without " lymphadenopathy.  Lungs: Normal effort, CTA bilaterally, no wheezes, rhonchi, or rales  CV: Regular rate and rhythm. No murmurs, rubs, or gallops.  Ext: No clubbing, cyanosis, edema.      Assessment & Plan:     29 y.o. female with the following -     1. Subacute maxillary sinusitis  Discussed changing her antibiotic to something that might be more effective for sinus issues.  Will get her on some Augmentin and she will let us know how things are going.  - amoxicillin-clavulanate (AUGMENTIN) 875-125 MG Tab; Take 1 Tablet by mouth 2 times a day for 7 days.  Dispense: 14 Tablet; Refill: 0    2. Attention deficit disorder (ADD) without hyperactivity  Discussed continuing her Wellbutrin as is for going back up on the dose of the Adderall to see if she has better efficacy with the spells.  - amphetamine-dextroamphetamine (ADDERALL) 30 MG tablet; Take 1 Tablet by mouth 2 times a day for 30 days.  Dispense: 60 Tablet; Refill: 0    3. Need for vaccination    - INFLUENZA VACCINE TRI INJ (PF)               No follow-ups on file.    Please note that this dictation was created using voice recognition software. I have made every reasonable attempt to correct obvious errors, but I expect that there are errors of grammar and possibly content that I did not discover before finalizing the note.

## 2024-12-23 DIAGNOSIS — F51.01 PRIMARY INSOMNIA: ICD-10-CM

## 2024-12-23 RX ORDER — CLONAZEPAM 1 MG/1
1 TABLET ORAL NIGHTLY
Qty: 30 TABLET | Refills: 0 | Status: SHIPPED | OUTPATIENT
Start: 2024-12-23 | End: 2025-01-22

## 2024-12-23 NOTE — TELEPHONE ENCOUNTER
Received request via: Pharmacy    Was the patient seen in the last year in this department? Yes    Does the patient have an active prescription (recently filled or refills available) for medication(s) requested? No    Pharmacy Name: [facility]     Does the patient have halfway Plus and need 100-day supply? (This applies to ALL medications) Patient does not have SCP

## 2025-01-07 ENCOUNTER — HOSPITAL ENCOUNTER (EMERGENCY)
Facility: MEDICAL CENTER | Age: 30
End: 2025-01-07
Attending: EMERGENCY MEDICINE
Payer: COMMERCIAL

## 2025-01-07 ENCOUNTER — APPOINTMENT (OUTPATIENT)
Dept: RADIOLOGY | Facility: MEDICAL CENTER | Age: 30
End: 2025-01-07
Attending: EMERGENCY MEDICINE
Payer: COMMERCIAL

## 2025-01-07 VITALS
RESPIRATION RATE: 18 BRPM | WEIGHT: 129.41 LBS | HEIGHT: 59 IN | TEMPERATURE: 98.6 F | BODY MASS INDEX: 26.09 KG/M2 | SYSTOLIC BLOOD PRESSURE: 115 MMHG | DIASTOLIC BLOOD PRESSURE: 74 MMHG | HEART RATE: 71 BPM | OXYGEN SATURATION: 96 %

## 2025-01-07 DIAGNOSIS — S16.1XXA STRAIN OF NECK MUSCLE, INITIAL ENCOUNTER: ICD-10-CM

## 2025-01-07 PROCEDURE — A9270 NON-COVERED ITEM OR SERVICE: HCPCS | Performed by: EMERGENCY MEDICINE

## 2025-01-07 PROCEDURE — 96372 THER/PROPH/DIAG INJ SC/IM: CPT

## 2025-01-07 PROCEDURE — 99284 EMERGENCY DEPT VISIT MOD MDM: CPT

## 2025-01-07 PROCEDURE — 700102 HCHG RX REV CODE 250 W/ 637 OVERRIDE(OP): Performed by: EMERGENCY MEDICINE

## 2025-01-07 PROCEDURE — 700111 HCHG RX REV CODE 636 W/ 250 OVERRIDE (IP): Performed by: EMERGENCY MEDICINE

## 2025-01-07 PROCEDURE — 72125 CT NECK SPINE W/O DYE: CPT

## 2025-01-07 RX ORDER — MORPHINE SULFATE 4 MG/ML
4 INJECTION INTRAVENOUS ONCE
Status: COMPLETED | OUTPATIENT
Start: 2025-01-07 | End: 2025-01-07

## 2025-01-07 RX ORDER — CYCLOBENZAPRINE HCL 10 MG
10 TABLET ORAL 3 TIMES DAILY PRN
Qty: 20 TABLET | Refills: 0 | Status: SHIPPED | OUTPATIENT
Start: 2025-01-07

## 2025-01-07 RX ORDER — CYCLOBENZAPRINE HCL 10 MG
10 TABLET ORAL ONCE
Status: COMPLETED | OUTPATIENT
Start: 2025-01-07 | End: 2025-01-07

## 2025-01-07 RX ORDER — ONDANSETRON 4 MG/1
4 TABLET, ORALLY DISINTEGRATING ORAL ONCE
Status: COMPLETED | OUTPATIENT
Start: 2025-01-07 | End: 2025-01-07

## 2025-01-07 RX ORDER — DIAZEPAM 5 MG/1
5 TABLET ORAL EVERY 8 HOURS PRN
Qty: 6 TABLET | Refills: 0 | Status: SHIPPED | OUTPATIENT
Start: 2025-01-07 | End: 2025-01-10

## 2025-01-07 RX ORDER — IBUPROFEN 600 MG/1
600 TABLET, FILM COATED ORAL EVERY 6 HOURS PRN
Qty: 15 TABLET | Refills: 0 | Status: SHIPPED | OUTPATIENT
Start: 2025-01-07

## 2025-01-07 RX ADMIN — ONDANSETRON 4 MG: 4 TABLET, ORALLY DISINTEGRATING ORAL at 21:16

## 2025-01-07 RX ADMIN — MORPHINE SULFATE 4 MG: 4 INJECTION INTRAVENOUS at 21:16

## 2025-01-07 RX ADMIN — CYCLOBENZAPRINE 10 MG: 10 TABLET, FILM COATED ORAL at 22:00

## 2025-01-07 ASSESSMENT — FIBROSIS 4 INDEX: FIB4 SCORE: 0.68

## 2025-01-07 NOTE — Clinical Note
Isa Mcclure was seen and treated in our emergency department on 1/7/2025.  She may return to work on 01/11/2025.       If you have any questions or concerns, please don't hesitate to call.      Cuauhtemoc Marshall D.O.

## 2025-01-08 NOTE — ED TRIAGE NOTES
"Chief Complaint   Patient presents with    Neck Pain     Felt a pop while in shower  States pain radiates down shoulders and back  Last night     /80   Pulse 89   Temp 37 °C (98.6 °F) (Temporal)   Resp 15   Ht 1.499 m (4' 11\")   Wt 58.7 kg (129 lb 6.6 oz)   SpO2 95%   BMI 26.14 kg/m²     Pt ambulated to ED w/ visitor for c/o feeling a \"pop\" on Right side of neck while in shower last night, states is no longer able to turn neck and pain radiates into shoulders and down back.    "

## 2025-01-08 NOTE — ED PROVIDER NOTES
ED Provider Note    CHIEF COMPLAINT  Chief Complaint   Patient presents with    Neck Pain     Felt a pop while in shower  States pain radiates down shoulders and back  Last night       EXTERNAL RECORDS REVIEWED  Outpatient Notes   [facility]    HPI/ROS  LIMITATION TO HISTORY   None  OUTSIDE HISTORIAN(S):  None    Isa Mcclure is a 29 y.o. female who presents here for evaluation of neck pain.  Patient states that she was in the shower washing her hair, when shelf pop in her neck, and started to have spasms.  She states that she had pain from the neck that radiated down to her shoulders.  Patient states that she has had this in the past, but this was happening again.  She denies any fall or trauma.  She has no paresthesias or weakness, no back pain, chest pain, shortness of breath.  Patient has taken some Motrin with little relief.    PAST MEDICAL HISTORY   has a past medical history of Allergy, Anxiety, Asthma, Depression, Drug abuse, IV (HCC), Migraine, Substance abuse (HCC), and Urinary tract infection.    SURGICAL HISTORY   has a past surgical history that includes cholecystectomy.    FAMILY HISTORY  Family History   Problem Relation Age of Onset    Hypertension Mother     Diabetes Mother     Hyperlipidemia Mother     Heart Disease Mother 60        MI    Psychiatric Illness Mother         depression, ADD    Hypertension Father     Hyperlipidemia Father     Psychiatric Illness Brother         depression    No Known Problems Sister     No Known Problems Maternal Aunt     No Known Problems Brother     No Known Problems Brother     No Known Problems Sister     No Known Problems Sister     Hypertension Paternal Grandmother        SOCIAL HISTORY  Social History     Tobacco Use    Smoking status: Former     Types: Cigarettes    Smokeless tobacco: Never   Vaping Use    Vaping status: Every Day   Substance and Sexual Activity    Alcohol use: Not Currently    Drug use: Not Currently    Sexual activity:  "Yes     Partners: Male     Birth control/protection: I.U.D.     Comment: none       CURRENT MEDICATIONS  Home Medications    **Home medications have not yet been reviewed for this encounter**       Audit from Redirected Encounters    **Home medications have not yet been reviewed for this encounter**         ALLERGIES  Allergies   Allergen Reactions    Bactrim Ds Hives and Unspecified     hives    Hydrocodone Itching and Nausea       PHYSICAL EXAM  VITAL SIGNS: /80   Pulse 89   Temp 37 °C (98.6 °F) (Temporal)   Resp 15   Ht 1.499 m (4' 11\")   Wt 58.7 kg (129 lb 6.6 oz)   SpO2 95%   BMI 26.14 kg/m²    Constitutional: Well developed, well nourished.  Mild acute distress.  HEENT: Normocephalic, atraumatic. Posterior pharynx clear and moist.  Eyes:  EOMI. Normal sclera.  Neck: Supple, bilateral cervical tenderness to palpation, nontender midline.  No obvious step-off or deformity.  Chest/Pulmonary: clear to ausculation. Symmetrical expansion.   Cardio: Regular rate and rhythm with no murmur.   Abdomen: Soft, nontender. No peritoneal signs. No guarding. No palpable masses.  Back: No CVA tenderness, nontender midline, no step offs.  Musculoskeletal: No deformity, no edema, neurovascular intact.   Neuro: Clear speech, appropriate, cooperative, cranial nerves II-XII grossly intact.  Equal , dorsi plantarflexion extension 5 out of 5 bilateral extremities.  Psych: Normal mood and affect      EKG/LABS  none    RADIOLOGY/PROCEDURES   I have independently interpreted the diagnostic imaging associated with this visit and am waiting the final reading from the radiologist.   My preliminary interpretation is as follows: below     Radiologist interpretation:  CT-CSPINE WITHOUT PLUS RECONS   Final Result      No acute fracture or traumatic listhesis in the cervical spine.          COURSE & MEDICAL DECISION MAKING    ASSESSMENT, COURSE AND PLAN  Care Narrative: This is a 29-year-old female here for evaluation of neck " pain.  Patient states that she has intermittent muscle spasms and pain to the neck.  She has no focal neurodeficits, no trauma, and feels better after pain medications here.  Patient also given muscle relaxer, Valium, and will use heat or ice as needed.        DISPOSITION AND DISCUSSIONS  I have discussed management of the patient with the following physicians and YON's: None    Discussion of management with other QHP or appropriate source(s): None    Escalation of care considered, and ultimately not performed: None    Barriers to care at this time, including but not limited to: None.     Decision tools and prescription drugs considered including, but not limited to: None.    FINAL DIAGNOSIS  1. Strain of neck muscle, initial encounter         Electronically signed by: Cuauhtemoc Marshall D.O., 1/7/2025 8:51 PM

## 2025-01-13 DIAGNOSIS — F98.8 ATTENTION DEFICIT DISORDER (ADD) WITHOUT HYPERACTIVITY: ICD-10-CM

## 2025-01-13 RX ORDER — DEXTROAMPHETAMINE SACCHARATE, AMPHETAMINE ASPARTATE, DEXTROAMPHETAMINE SULFATE AND AMPHETAMINE SULFATE 7.5; 7.5; 7.5; 7.5 MG/1; MG/1; MG/1; MG/1
30 TABLET ORAL 2 TIMES DAILY
Qty: 60 TABLET | Refills: 0 | Status: SHIPPED | OUTPATIENT
Start: 2025-01-13 | End: 2025-02-12

## 2025-01-13 NOTE — TELEPHONE ENCOUNTER
Received request via: Pharmacy    Was the patient seen in the last year in this department? Yes    Does the patient have an active prescription (recently filled or refills available) for medication(s) requested? No    Pharmacy Name: [facility]     Does the patient have retirement Plus and need 100-day supply? (This applies to ALL medications) Patient does not have SCP

## 2025-02-05 DIAGNOSIS — F51.01 PRIMARY INSOMNIA: ICD-10-CM

## 2025-02-06 RX ORDER — CLONAZEPAM 1 MG/1
1 TABLET ORAL NIGHTLY
Qty: 30 TABLET | Refills: 0 | Status: SHIPPED | OUTPATIENT
Start: 2025-02-06 | End: 2025-03-08

## 2025-02-11 ENCOUNTER — APPOINTMENT (OUTPATIENT)
Dept: URGENT CARE | Facility: CLINIC | Age: 30
End: 2025-02-11
Payer: COMMERCIAL

## 2025-02-11 DIAGNOSIS — F98.8 ATTENTION DEFICIT DISORDER (ADD) WITHOUT HYPERACTIVITY: ICD-10-CM

## 2025-02-11 RX ORDER — DEXTROAMPHETAMINE SACCHARATE, AMPHETAMINE ASPARTATE, DEXTROAMPHETAMINE SULFATE AND AMPHETAMINE SULFATE 7.5; 7.5; 7.5; 7.5 MG/1; MG/1; MG/1; MG/1
30 TABLET ORAL 2 TIMES DAILY
Qty: 60 TABLET | Refills: 0 | Status: SHIPPED | OUTPATIENT
Start: 2025-02-11 | End: 2025-02-14

## 2025-02-11 NOTE — TELEPHONE ENCOUNTER
Received request via: Patient    Was the patient seen in the last year in this department? Yes    Does the patient have an active prescription (recently filled or refills available) for medication(s) requested? No    Pharmacy Name: [facility]    Does the patient have detention Plus and need 100-day supply? (This applies to ALL medications) Patient does not have SCP

## 2025-02-14 ENCOUNTER — PATIENT MESSAGE (OUTPATIENT)
Dept: MEDICAL GROUP | Facility: LAB | Age: 30
End: 2025-02-14
Payer: COMMERCIAL

## 2025-02-14 ENCOUNTER — TELEPHONE (OUTPATIENT)
Dept: MEDICAL GROUP | Facility: LAB | Age: 30
End: 2025-02-14
Payer: COMMERCIAL

## 2025-02-14 DIAGNOSIS — F98.8 ATTENTION DEFICIT DISORDER (ADD) WITHOUT HYPERACTIVITY: ICD-10-CM

## 2025-02-14 RX ORDER — DEXTROAMPHETAMINE SACCHARATE, AMPHETAMINE ASPARTATE, DEXTROAMPHETAMINE SULFATE AND AMPHETAMINE SULFATE 5; 5; 5; 5 MG/1; MG/1; MG/1; MG/1
20 TABLET ORAL 3 TIMES DAILY
Qty: 90 EACH | Refills: 0 | Status: SHIPPED | OUTPATIENT
Start: 2025-02-14 | End: 2025-03-16

## 2025-03-05 ENCOUNTER — APPOINTMENT (OUTPATIENT)
Dept: URGENT CARE | Facility: CLINIC | Age: 30
End: 2025-03-05
Payer: COMMERCIAL

## 2025-03-07 DIAGNOSIS — F33.2 SEVERE EPISODE OF RECURRENT MAJOR DEPRESSIVE DISORDER, WITHOUT PSYCHOTIC FEATURES (HCC): ICD-10-CM

## 2025-03-07 DIAGNOSIS — F51.01 PRIMARY INSOMNIA: ICD-10-CM

## 2025-03-07 RX ORDER — CLONAZEPAM 1 MG/1
1 TABLET ORAL NIGHTLY
Qty: 30 TABLET | Refills: 0 | Status: SHIPPED | OUTPATIENT
Start: 2025-03-07 | End: 2025-04-06

## 2025-03-07 RX ORDER — BUPROPION HYDROCHLORIDE 150 MG/1
150 TABLET ORAL EVERY MORNING
Qty: 90 TABLET | Refills: 1 | Status: SHIPPED | OUTPATIENT
Start: 2025-03-07

## 2025-03-07 NOTE — TELEPHONE ENCOUNTER
Received request via: Patient    Was the patient seen in the last year in this department? Yes    Does the patient have an active prescription (recently filled or refills available) for medication(s) requested? No    [facility]    Does the patient have senior living Plus and need 100-day supply? (This applies to ALL medications) Patient does not have SCP

## 2025-03-10 ENCOUNTER — PATIENT MESSAGE (OUTPATIENT)
Dept: MEDICAL GROUP | Facility: LAB | Age: 30
End: 2025-03-10
Payer: COMMERCIAL

## 2025-03-10 DIAGNOSIS — F98.8 ATTENTION DEFICIT DISORDER (ADD) WITHOUT HYPERACTIVITY: ICD-10-CM

## 2025-03-10 RX ORDER — DEXTROAMPHETAMINE SACCHARATE, AMPHETAMINE ASPARTATE, DEXTROAMPHETAMINE SULFATE AND AMPHETAMINE SULFATE 7.5; 7.5; 7.5; 7.5 MG/1; MG/1; MG/1; MG/1
30 TABLET ORAL 2 TIMES DAILY
Qty: 60 TABLET | Refills: 0 | Status: SHIPPED | OUTPATIENT
Start: 2025-03-10 | End: 2025-04-09

## 2025-03-11 ENCOUNTER — PHARMACY VISIT (OUTPATIENT)
Dept: PHARMACY | Facility: MEDICAL CENTER | Age: 30
End: 2025-03-11
Payer: COMMERCIAL

## 2025-03-11 ENCOUNTER — OFFICE VISIT (OUTPATIENT)
Dept: URGENT CARE | Facility: CLINIC | Age: 30
End: 2025-03-11
Payer: COMMERCIAL

## 2025-03-11 VITALS
OXYGEN SATURATION: 100 % | RESPIRATION RATE: 21 BRPM | BODY MASS INDEX: 27.29 KG/M2 | HEIGHT: 58 IN | DIASTOLIC BLOOD PRESSURE: 70 MMHG | HEART RATE: 117 BPM | WEIGHT: 130 LBS | SYSTOLIC BLOOD PRESSURE: 114 MMHG | TEMPERATURE: 98 F

## 2025-03-11 DIAGNOSIS — J45.901 ASTHMA WITH ACUTE EXACERBATION, UNSPECIFIED ASTHMA SEVERITY, UNSPECIFIED WHETHER PERSISTENT: ICD-10-CM

## 2025-03-11 DIAGNOSIS — R05.2 SUBACUTE COUGH: ICD-10-CM

## 2025-03-11 PROCEDURE — RXMED WILLOW AMBULATORY MEDICATION CHARGE

## 2025-03-11 PROCEDURE — 99214 OFFICE O/P EST MOD 30 MIN: CPT

## 2025-03-11 PROCEDURE — 3078F DIAST BP <80 MM HG: CPT

## 2025-03-11 PROCEDURE — 3074F SYST BP LT 130 MM HG: CPT

## 2025-03-11 RX ORDER — DOXYCYCLINE HYCLATE 100 MG
100 TABLET ORAL 2 TIMES DAILY
Qty: 14 TABLET | Refills: 0 | Status: SHIPPED | OUTPATIENT
Start: 2025-03-11 | End: 2025-03-18

## 2025-03-11 RX ORDER — PREDNISONE 20 MG/1
40 TABLET ORAL DAILY
Qty: 10 TABLET | Refills: 0 | Status: SHIPPED | OUTPATIENT
Start: 2025-03-11 | End: 2025-03-16

## 2025-03-11 ASSESSMENT — ENCOUNTER SYMPTOMS
COUGH: 1
SPUTUM PRODUCTION: 1
FEVER: 0
WHEEZING: 1
SHORTNESS OF BREATH: 1

## 2025-03-11 ASSESSMENT — FIBROSIS 4 INDEX: FIB4 SCORE: 0.68

## 2025-03-12 NOTE — PROGRESS NOTES
Verbal consent was acquired by the patient to use [employer] ambient listening note generation during this visit   Subjective:   Isa Mcclure is a 29 y.o. female who presents for Cough (Patient states cough, runny nose, short of breath, chest congestion. Patient states cough is wet. Patient states having a hard time catching her breath. Patient states being sick 02/07/2025)      HPI:  History of Present Illness  The patient is a 29-year-old female who presents for evaluation of a cough.    She has been experiencing a productive cough for approximately 2 weeks, which has progressively worsened. She also reports wheezing and shortness of breath. Today, she experienced chills and body aches but no fever. She has a history of asthma and has utilized her inhaler multiple times today without any relief. The onset of these symptoms was preceded by viral symptoms.       Review of Systems   Constitutional:  Negative for fever.   HENT:  Positive for congestion.    Respiratory:  Positive for cough, sputum production, shortness of breath and wheezing.        Medications:    Current Outpatient Medications on File Prior to Visit   Medication Sig Dispense Refill    amphetamine-dextroamphetamine (ADDERALL) 30 MG tablet Take 1 Tablet by mouth 2 times a day for 30 days. 60 Tablet 0    buPROPion (WELLBUTRIN XL) 150 MG XL tablet Take 1 Tablet by mouth every morning. 90 Tablet 1    clonazePAM (KLONOPIN) 1 MG Tab Take 1 Tablet by mouth every evening for 30 days. 30 Tablet 0    amphetamine-dextroamphetamine (ADDERALL) 20 MG Tab Take 1 Tablet by mouth 3 times a day for 30 days. 90 Each 0    levonorgestrel (MIRENA, 52 MG,) 20 MCG/DAY IUD 1 Each by Intrauterine route one time.      albuterol 108 (90 Base) MCG/ACT Aero Soln inhalation aerosol Inhale 2 Puffs every 6 hours as needed for Shortness of Breath. 8.5 g 3    cyclobenzaprine (FLEXERIL) 10 mg Tab Take 1 Tablet by mouth 3 times a day as needed for Mild Pain. 20 Tablet 0     "ibuprofen (MOTRIN) 600 MG Tab Take 1 Tablet by mouth every 6 hours as needed for Mild Pain. 15 Tablet 0     No current facility-administered medications on file prior to visit.        Allergies:   Bactrim ds and Hydrocodone    Problem List:   Patient Active Problem List   Diagnosis    Severe episode of recurrent major depressive disorder (HCC)    PTSD (post-traumatic stress disorder)    Eczema    Amenorrhea    Heavy menses    Painful menstruation    Mild persistent asthma without complication    Insomnia        Surgical History:  Past Surgical History:   Procedure Laterality Date    CHOLECYSTECTOMY         Past Social Hx:   Social History     Tobacco Use    Smoking status: Former     Types: Cigarettes    Smokeless tobacco: Never   Vaping Use    Vaping status: Every Day   Substance Use Topics    Alcohol use: Not Currently    Drug use: Not Currently          Problem list, medications, and allergies reviewed by myself today in Epic.     Objective:     /70   Pulse (!) 117   Temp 36.7 °C (98 °F) (Temporal)   Resp (!) 21   Ht 1.473 m (4' 10\")   Wt 59 kg (130 lb)   SpO2 100%   BMI 27.17 kg/m²     Physical Exam  Vitals and nursing note reviewed.   Constitutional:       General: She is not in acute distress.     Appearance: Normal appearance. She is normal weight. She is ill-appearing. She is not toxic-appearing or diaphoretic.   HENT:      Head: Normocephalic and atraumatic.      Right Ear: Tympanic membrane, ear canal and external ear normal. There is no impacted cerumen.      Left Ear: Tympanic membrane and ear canal normal.      Nose: Congestion present. No rhinorrhea.      Mouth/Throat:      Mouth: Mucous membranes are moist.      Pharynx: Oropharynx is clear. No oropharyngeal exudate or posterior oropharyngeal erythema.   Cardiovascular:      Rate and Rhythm: Normal rate and regular rhythm.      Pulses: Normal pulses.      Heart sounds: Normal heart sounds. No murmur heard.     No friction rub. No gallop. "   Pulmonary:      Effort: Pulmonary effort is normal. No respiratory distress.      Breath sounds: Normal breath sounds. No stridor. No wheezing, rhonchi or rales.   Chest:      Chest wall: No tenderness.   Musculoskeletal:      Cervical back: Neck supple. No tenderness.   Lymphadenopathy:      Cervical: No cervical adenopathy.   Skin:     General: Skin is warm and dry.      Capillary Refill: Capillary refill takes less than 2 seconds.   Neurological:      General: No focal deficit present.      Mental Status: She is alert and oriented to person, place, and time. Mental status is at baseline.      Cranial Nerves: No cranial nerve deficit.      Motor: No weakness.      Gait: Gait normal.   Psychiatric:         Mood and Affect: Mood normal.         Behavior: Behavior normal.         Thought Content: Thought content normal.         Judgment: Judgment normal.         Assessment/Plan:     Diagnosis and associated orders:   1. Subacute cough  - doxycycline (VIBRAMYCIN) 100 MG Tab; Take 1 Tablet by mouth 2 times a day for 7 days.  Dispense: 14 Tablet; Refill: 0  - predniSONE (DELTASONE) 20 MG Tab; Take 2 Tablets by mouth every day for 5 days.  Dispense: 10 Tablet; Refill: 0    2. Asthma with acute exacerbation, unspecified asthma severity, unspecified whether persistent  - doxycycline (VIBRAMYCIN) 100 MG Tab; Take 1 Tablet by mouth 2 times a day for 7 days.  Dispense: 14 Tablet; Refill: 0  - predniSONE (DELTASONE) 20 MG Tab; Take 2 Tablets by mouth every day for 5 days.  Dispense: 10 Tablet; Refill: 0        Comments/MDM:   Pt is clinically stable at today's acute urgent care visit.  No acute distress noted. Appropriate for outpatient management at this time.     Assessment & Plan  .  She has had a productive cough for almost 2 weeks, accompanied by wheezing, shortness of breath, chills, and body aches. Her lungs sound clear, and her oxygen saturation is 100%. Despite using her inhaler multiple times, there has been no  improvement. A treatment plan includes an antibiotic to cover potential bacterial infection and a short course of steroids to manage an acute asthma exacerbation. She is advised to maintain adequate hydration.            Discussed DDx, management options (risks,benefits, and alternatives to planned treatment), natural progression and supportive care.  Expressed understanding and the treatment plan was agreed upon. Questions were encouraged and answered   Return to urgent care prn if new or worsening sx or if there is no improvement in condition prn.    Educated in Red flags and indications to immediately call 911 or present to the Emergency Department.   Advised the patient to follow-up with the primary care physician for recheck, reevaluation, and consideration of further management.    I personally reviewed prior external notes and test results pertinent to today's visit.  I have independently reviewed and interpreted all diagnostics ordered during this urgent care acute visit.       Please note that this dictation was created using voice recognition software. I have made a reasonable attempt to correct obvious errors, but I expect that there are errors of grammar and possibly content that I did not discover before finalizing the note.    This note was electronically signed by KELSI Lopez

## 2025-03-13 ENCOUNTER — HOSPITAL ENCOUNTER (EMERGENCY)
Facility: MEDICAL CENTER | Age: 30
End: 2025-03-13
Attending: EMERGENCY MEDICINE
Payer: COMMERCIAL

## 2025-03-13 ENCOUNTER — APPOINTMENT (OUTPATIENT)
Dept: RADIOLOGY | Facility: MEDICAL CENTER | Age: 30
End: 2025-03-13
Attending: EMERGENCY MEDICINE
Payer: COMMERCIAL

## 2025-03-13 VITALS
HEART RATE: 78 BPM | BODY MASS INDEX: 28.28 KG/M2 | DIASTOLIC BLOOD PRESSURE: 90 MMHG | RESPIRATION RATE: 12 BRPM | HEIGHT: 58 IN | WEIGHT: 134.7 LBS | SYSTOLIC BLOOD PRESSURE: 127 MMHG | TEMPERATURE: 98.8 F | OXYGEN SATURATION: 100 %

## 2025-03-13 DIAGNOSIS — R07.89 CHEST WALL PAIN: ICD-10-CM

## 2025-03-13 DIAGNOSIS — R06.02 SHORTNESS OF BREATH: ICD-10-CM

## 2025-03-13 DIAGNOSIS — R00.0 TACHYCARDIA: ICD-10-CM

## 2025-03-13 DIAGNOSIS — J02.9 SORE THROAT: ICD-10-CM

## 2025-03-13 DIAGNOSIS — R59.1 LYMPHADENOPATHY: ICD-10-CM

## 2025-03-13 LAB
ALBUMIN SERPL BCP-MCNC: 4 G/DL (ref 3.2–4.9)
ALBUMIN/GLOB SERPL: 1.4 G/DL
ALP SERPL-CCNC: 53 U/L (ref 30–99)
ALT SERPL-CCNC: 23 U/L (ref 2–50)
ANION GAP SERPL CALC-SCNC: 8 MMOL/L (ref 7–16)
AST SERPL-CCNC: 26 U/L (ref 12–45)
BASOPHILS # BLD AUTO: 0.3 % (ref 0–1.8)
BASOPHILS # BLD: 0.04 K/UL (ref 0–0.12)
BILIRUB SERPL-MCNC: <0.2 MG/DL (ref 0.1–1.5)
BUN SERPL-MCNC: 21 MG/DL (ref 8–22)
CALCIUM ALBUM COR SERPL-MCNC: 9.1 MG/DL (ref 8.5–10.5)
CALCIUM SERPL-MCNC: 9.1 MG/DL (ref 8.4–10.2)
CHLORIDE SERPL-SCNC: 105 MMOL/L (ref 96–112)
CO2 SERPL-SCNC: 26 MMOL/L (ref 20–33)
CREAT SERPL-MCNC: 0.79 MG/DL (ref 0.5–1.4)
D DIMER PPP IA.FEU-MCNC: <0.27 UG/ML (FEU) (ref 0–0.5)
EKG IMPRESSION: NORMAL
EOSINOPHIL # BLD AUTO: 0.27 K/UL (ref 0–0.51)
EOSINOPHIL NFR BLD: 2.4 % (ref 0–6.9)
ERYTHROCYTE [DISTWIDTH] IN BLOOD BY AUTOMATED COUNT: 45.4 FL (ref 35.9–50)
FLUAV RNA SPEC QL NAA+PROBE: NEGATIVE
FLUBV RNA SPEC QL NAA+PROBE: NEGATIVE
GFR SERPLBLD CREATININE-BSD FMLA CKD-EPI: 103 ML/MIN/1.73 M 2
GLOBULIN SER CALC-MCNC: 2.8 G/DL (ref 1.9–3.5)
GLUCOSE SERPL-MCNC: 77 MG/DL (ref 65–99)
HCG SERPL QL: NEGATIVE
HCT VFR BLD AUTO: 37.3 % (ref 37–47)
HETEROPH AB SER QL: NEGATIVE
HGB BLD-MCNC: 12.4 G/DL (ref 12–16)
IMM GRANULOCYTES # BLD AUTO: 0.05 K/UL (ref 0–0.11)
IMM GRANULOCYTES NFR BLD AUTO: 0.4 % (ref 0–0.9)
LYMPHOCYTES # BLD AUTO: 2.81 K/UL (ref 1–4.8)
LYMPHOCYTES NFR BLD: 24.5 % (ref 22–41)
MCH RBC QN AUTO: 31.2 PG (ref 27–33)
MCHC RBC AUTO-ENTMCNC: 33.2 G/DL (ref 32.2–35.5)
MCV RBC AUTO: 93.7 FL (ref 81.4–97.8)
MONOCYTES # BLD AUTO: 0.66 K/UL (ref 0–0.85)
MONOCYTES NFR BLD AUTO: 5.8 % (ref 0–13.4)
NEUTROPHILS # BLD AUTO: 7.63 K/UL (ref 1.82–7.42)
NEUTROPHILS NFR BLD: 66.6 % (ref 44–72)
NRBC # BLD AUTO: 0 K/UL
NRBC BLD-RTO: 0 /100 WBC (ref 0–0.2)
PLATELET # BLD AUTO: 329 K/UL (ref 164–446)
PMV BLD AUTO: 10.4 FL (ref 9–12.9)
POTASSIUM SERPL-SCNC: 3.7 MMOL/L (ref 3.6–5.5)
PROT SERPL-MCNC: 6.8 G/DL (ref 6–8.2)
RBC # BLD AUTO: 3.98 M/UL (ref 4.2–5.4)
RSV RNA SPEC QL NAA+PROBE: NEGATIVE
S PYO DNA SPEC NAA+PROBE: NOT DETECTED
SARS-COV-2 RNA RESP QL NAA+PROBE: NOTDETECTED
SODIUM SERPL-SCNC: 139 MMOL/L (ref 135–145)
SPECIMEN SOURCE: NORMAL
TROPONIN T SERPL-MCNC: <6 NG/L (ref 6–19)
WBC # BLD AUTO: 11.5 K/UL (ref 4.8–10.8)

## 2025-03-13 PROCEDURE — 700102 HCHG RX REV CODE 250 W/ 637 OVERRIDE(OP): Performed by: EMERGENCY MEDICINE

## 2025-03-13 PROCEDURE — 71045 X-RAY EXAM CHEST 1 VIEW: CPT

## 2025-03-13 PROCEDURE — 99283 EMERGENCY DEPT VISIT LOW MDM: CPT

## 2025-03-13 PROCEDURE — 87651 STREP A DNA AMP PROBE: CPT

## 2025-03-13 PROCEDURE — 36415 COLL VENOUS BLD VENIPUNCTURE: CPT

## 2025-03-13 PROCEDURE — 0241U HCHG SARS-COV-2 COVID-19 NFCT DS RESP RNA 4 TRGT MIC: CPT

## 2025-03-13 PROCEDURE — 93005 ELECTROCARDIOGRAM TRACING: CPT | Mod: TC | Performed by: EMERGENCY MEDICINE

## 2025-03-13 PROCEDURE — 84703 CHORIONIC GONADOTROPIN ASSAY: CPT

## 2025-03-13 PROCEDURE — 85025 COMPLETE CBC W/AUTO DIFF WBC: CPT

## 2025-03-13 PROCEDURE — 85379 FIBRIN DEGRADATION QUANT: CPT

## 2025-03-13 PROCEDURE — 86308 HETEROPHILE ANTIBODY SCREEN: CPT

## 2025-03-13 PROCEDURE — A9270 NON-COVERED ITEM OR SERVICE: HCPCS | Performed by: EMERGENCY MEDICINE

## 2025-03-13 PROCEDURE — 80053 COMPREHEN METABOLIC PANEL: CPT

## 2025-03-13 PROCEDURE — 84484 ASSAY OF TROPONIN QUANT: CPT

## 2025-03-13 RX ORDER — ACETAMINOPHEN 10 MG/ML
1000 INJECTION, SOLUTION INTRAVENOUS ONCE
Status: DISCONTINUED | OUTPATIENT
Start: 2025-03-13 | End: 2025-03-13

## 2025-03-13 RX ORDER — NAPROXEN 500 MG/1
500 TABLET ORAL 2 TIMES DAILY WITH MEALS
Qty: 20 TABLET | Refills: 0 | Status: SHIPPED | OUTPATIENT
Start: 2025-03-13 | End: 2025-03-23

## 2025-03-13 RX ORDER — NAPROXEN 250 MG/1
500 TABLET ORAL ONCE
Status: COMPLETED | OUTPATIENT
Start: 2025-03-13 | End: 2025-03-13

## 2025-03-13 RX ORDER — KETOROLAC TROMETHAMINE 15 MG/ML
15 INJECTION, SOLUTION INTRAMUSCULAR; INTRAVENOUS ONCE
Status: DISCONTINUED | OUTPATIENT
Start: 2025-03-13 | End: 2025-03-13

## 2025-03-13 RX ADMIN — NAPROXEN 500 MG: 250 TABLET ORAL at 18:11

## 2025-03-13 RX ADMIN — AMOXICILLIN AND CLAVULANATE POTASSIUM 1 TABLET: 875; 125 TABLET, FILM COATED ORAL at 18:12

## 2025-03-13 ASSESSMENT — FIBROSIS 4 INDEX: FIB4 SCORE: 0.68

## 2025-03-13 NOTE — ED PROVIDER NOTES
ED Provider Note    CHIEF COMPLAINT  Chief Complaint   Patient presents with    Flu Like Symptoms     Cough, congestion, hoarse, SOB, fevers    started 2 weeks ago   not getting better        EXTERNAL RECORDS REVIEWED  Outpatient Notes patient note from 3/11/2025 and the patient was seen for ongoing cough with runny nose, shortness of breath and chest congestion.  She was having a hard time catching her breath at that time and felt like she has been sick since 2/7/2025.  She was noted to be having a little bit of wheezing, some tachycardia, she was started on doxycycline, prednisone for concerns regarding a subacute cough and asthma exacerbation.  Not have oxygen desaturations, she is able to tolerate p.o.,    HPI/ROS  LIMITATION TO HISTORY   Select: : None  OUTSIDE HISTORIAN(S):  none    Isa Mcclure is a 29 y.o. female who presents with ongoing cough congestion and shortness of breath sensations with deep inspiration.  Patient states that this had started in February, she had been seen and evaluated and eventually was placed on doxycycline and continued steroids and has been using her albuterol medication.  She continues to feel like she has some element of productive cough, feels like things are getting worse with some elements of chest wall discomfort that is worse with exertion.  Occasionally she has wheeziness though she is not noticing at this time, she has a sore throat and feels like her throat pain has been worse.  She has had subjective fevers and chills with no urinary symptoms no abdominal pain, no nausea or vomiting.  Occasionally she will get a gagging sensation if her cough worsens.  She has noticed some of the skin worse when she attempts to exert herself but has no familial history of early onset cardiac disease, no history of familial arrhythmias or sudden cardiac death.    PAST MEDICAL HISTORY   has a past medical history of Allergy, Anxiety, Asthma, Depression, Drug abuse, IV (HCC),  Migraine, Substance abuse (HCC), and Urinary tract infection.    SURGICAL HISTORY   has a past surgical history that includes cholecystectomy.    FAMILY HISTORY  Family History   Problem Relation Age of Onset    Hypertension Mother     Diabetes Mother     Hyperlipidemia Mother     Heart Disease Mother 60        MI    Psychiatric Illness Mother         depression, ADD    Hypertension Father     Hyperlipidemia Father     Psychiatric Illness Brother         depression    No Known Problems Sister     No Known Problems Maternal Aunt     No Known Problems Brother     No Known Problems Brother     No Known Problems Sister     No Known Problems Sister     Hypertension Paternal Grandmother      SOCIAL HISTORY  Social History     Tobacco Use    Smoking status: Former     Types: Cigarettes    Smokeless tobacco: Never   Vaping Use    Vaping status: Every Day   Substance and Sexual Activity    Alcohol use: Not Currently    Drug use: Not Currently    Sexual activity: Yes     Partners: Male     Birth control/protection: I.U.D.     Comment: none     CURRENT MEDICATIONS  Home Medications       Reviewed by Heidy Rivera R.N. (Registered Nurse) on 03/13/25 at 1500  Med List Status: Partial     Medication Last Dose Status   albuterol 108 (90 Base) MCG/ACT Aero Soln inhalation aerosol  Active   amphetamine-dextroamphetamine (ADDERALL) 20 MG Tab  Active   amphetamine-dextroamphetamine (ADDERALL) 30 MG tablet  Active   buPROPion (WELLBUTRIN XL) 150 MG XL tablet  Active   clonazePAM (KLONOPIN) 1 MG Tab  Active   cyclobenzaprine (FLEXERIL) 10 mg Tab  Active   doxycycline (VIBRAMYCIN) 100 MG Tab  Active   ibuprofen (MOTRIN) 600 MG Tab  Active   levonorgestrel (MIRENA, 52 MG,) 20 MCG/DAY IUD  Active   predniSONE (DELTASONE) 20 MG Tab  Active                  ALLERGIES  Allergies   Allergen Reactions    Bactrim Ds Hives and Unspecified     hives    Hydrocodone Itching and Nausea     PHYSICAL EXAM  VITAL SIGNS: BP (!) 127/90   Pulse 78    "Temp 37.1 °C (98.8 °F) (Temporal)   Resp 12   Ht 1.473 m (4' 10\")   Wt 61.1 kg (134 lb 11.2 oz)   SpO2 100%   BMI 28.15 kg/m²    Genl: F sitting in chair comfortably, speaking clearly, appears sick but in no acute distress   Head: NC/AT   ENT: Mucous membranes moist, posterior pharynx erythematous, left-sided tonsillar exudates, left-sided submandibular lymphadenopathy, uvula midline, nares patent bilaterally   Pulmonary: Lungs are clear to auscultation bilaterally no appreciable wheezes  Chest: No TTP  CV:  tachycardia, no murmur appreciated  Abdomen: soft, NT/ND; no rebound/guarding  Musculoskeletal: Pain free ROM of the neck. Moving upper and lower extremities in spontaneous and coordinated fashion  Neuro: A&Ox4 (person, place, time, situation), speech fluent, gait steady, no focal deficits appreciated, No cerebellar signs. Sensation is grossly intact in the distal upper and lower extremities.  5/5 strength in  and dorsiflexion/plantar flexion of the ankles  Psych: Patient has an appropriate affect and behavior  Skin: No rash or lesions.  No pallor or jaundice.  No cyanosis.  Warm and dry.     EKG/LABS  Results for orders placed or performed during the hospital encounter of 25   EKG   Result Value Ref Range    Report       [facility]    Test Date:  2025  Pt Name:    KENNEY CISSE                Department: [facility]  MRN:        [id number]                      [facility]  Gender:     Female                       Technician: TALON  :        1995                   Requested By:DALLIN RODRIGES  Order #:    [id number]                    Reading MD: Luc Santiago MD    Measurements  Intervals                                Axis  Rate:       86                           P:          53  NY:         138                          QRS:        25  QRSD:       85                           T:          39  QT:         345  QTc:        413    Interpretive " Statements  Sinus rhythm, rate of 86, normal intervals and axis, no acute ST segment  elevations or depressions.  Unchanged from prior dated 7/30/2019.  Electronically Signed On 03- 16:48:22 PDT by Luc Santiago MD       *Note: Due to a large number of results and/or encounters for the requested time period, some results have not been displayed. A complete set of results can be found in Results Review.   I have independently interpreted this EKG    Labs Reviewed   CBC WITH DIFFERENTIAL - Abnormal; Notable for the following components:       Result Value    WBC 11.5 (*)     RBC 3.98 (*)     Neutrophils (Absolute) 7.63 (*)     All other components within normal limits   COV-2, FLU A/B, AND RSV BY PCR (CEPHEID)   TROPONIN   COMP METABOLIC PANEL   HCG QUAL SERUM   D-DIMER   GROUP A STREP BY PCR   HETEROPHILE AB SCREEN   ESTIMATED GFR     RADIOLOGY/PROCEDURES   I have independently interpreted the diagnostic imaging associated with this visit and am waiting the final reading from the radiologist.   My preliminary interpretation is as follows: No focal infiltrates, no cardiomegaly  Radiologist interpretation:  DX-CHEST-PORTABLE (1 VIEW)   Final Result      No radiographic evidence of acute cardiopulmonary process.        COURSE & MEDICAL DECISION MAKING    ASSESSMENT, COURSE AND PLAN  Care Narrative: Evaluated for symptoms as described above.  The patient presents with several concerns primarily of continuing to feel unwell with bodyaches chills and feels like occasionally this is exacerbating her underlying asthma.  She is not wheezy nor she having tachypnea or hypoxia at this point.  She does have some element of pleuritic chest discomfort, recently been placed on steroids and doxycycline along with intermittent use of her albuterol as needed with her symptoms.  With the duration of her overall illness being several weeks and possibly a month or did have concerns about the possibility of underlying significant  viral syndrome like mono.  She has now developed sore throat with some exudates and strep test will be obtained.  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  She does not appear to be in acute asthma exacerbation nor status asthmaticus.  IV access to be obtained as with her persistent chest wall discomfort and tachycardia cannot exclude PE by PERC criteria.  She is low risk by Wells criteria.    Viral panel came back as negative for RSV/flu and COVID.  This came back as negative    Chest x-ray shows no focal infiltrates.    All the patient's labs are reassuring, there is no evidence of clot, her tachycardia has improved with administration of anti-inflammatory medications and rest.  She shows no signs of cardiac irritation as a cause of her chest wall discomfort and D-dimer is not elevated.  With a negative viral panel, negative monotest, negative strep test she clinically has pharyngitis with prolonged amount of lymphadenopathy.  A greater than a week with some subjective fevers I will have her treated.  I have gone back to the bedside discussed the need for antibiotic switch from doxycycline to Augmentin turns out that she has not been taking this medication.  She will throughout the medication and take Augmentin as it has better coverage for oral causes of the continued lymphadenopathy.  Questions are addressed and she is discharged home in stable condition.    DISPOSITION AND DISCUSSIONS  I have discussed management of the patient with the following physicians and YON's:  none    Discussion of management with other QHP or appropriate source(s): None     Escalation of care considered, and ultimately not performed:acute inpatient care management, however at this time, the patient is most appropriate for outpatient management    Barriers to care at this time, including but not limited to: none.     Decision  tools and prescription drugs considered including, but not limited to: Antibiotics augmentin .    FINAL DIAGNOSIS  1. Sore throat    2. Chest wall pain    3. Shortness of breath    4. Tachycardia    5. Lymphadenopathy      Electronically signed by: Jack Calle M.D., 3/13/2025 3:34 PM

## 2025-03-13 NOTE — ED TRIAGE NOTES
"BP (!) 132/94   Pulse (!) 105   Temp 37.2 °C (98.9 °F) (Temporal)   Resp 18   Ht 1.473 m (4' 10\")   Wt 61.1 kg (134 lb 11.2 oz)   SpO2 97%   BMI 28.15 kg/m²   Chief Complaint   Patient presents with    Flu Like Symptoms     Cough, congestion, hoarse, SOB, fevers    started 2 weeks ago   not getting better        Comes in by herself   "

## 2025-03-17 ENCOUNTER — APPOINTMENT (OUTPATIENT)
Dept: MEDICAL GROUP | Facility: LAB | Age: 30
End: 2025-03-17
Payer: COMMERCIAL

## 2025-04-10 DIAGNOSIS — F51.01 PRIMARY INSOMNIA: ICD-10-CM

## 2025-04-10 PROCEDURE — RXMED WILLOW AMBULATORY MEDICATION CHARGE: Performed by: FAMILY MEDICINE

## 2025-04-10 RX ORDER — CLONAZEPAM 1 MG/1
1 TABLET ORAL NIGHTLY
Qty: 30 TABLET | Refills: 0 | Status: SHIPPED | OUTPATIENT
Start: 2025-04-10 | End: 2025-05-15

## 2025-04-15 ENCOUNTER — PHARMACY VISIT (OUTPATIENT)
Dept: PHARMACY | Facility: MEDICAL CENTER | Age: 30
End: 2025-04-15
Payer: COMMERCIAL

## 2025-04-15 DIAGNOSIS — F98.8 ATTENTION DEFICIT DISORDER (ADD) WITHOUT HYPERACTIVITY: ICD-10-CM

## 2025-04-15 RX ORDER — DEXTROAMPHETAMINE SACCHARATE, AMPHETAMINE ASPARTATE, DEXTROAMPHETAMINE SULFATE AND AMPHETAMINE SULFATE 7.5; 7.5; 7.5; 7.5 MG/1; MG/1; MG/1; MG/1
30 TABLET ORAL 2 TIMES DAILY
Qty: 60 TABLET | Refills: 0 | Status: SHIPPED | OUTPATIENT
Start: 2025-04-15 | End: 2025-05-15

## 2025-04-15 NOTE — TELEPHONE ENCOUNTER
Received request via: Patient    Was the patient seen in the last year in this department? Yes    Does the patient have an active prescription (recently filled or refills available) for medication(s) requested? No    Pharmacy Name: [facility]    Does the patient have group home Plus and need 100-day supply? (This applies to ALL medications) Patient does not have SCP

## 2025-05-05 ENCOUNTER — APPOINTMENT (OUTPATIENT)
Dept: MEDICAL GROUP | Facility: LAB | Age: 30
End: 2025-05-05
Payer: COMMERCIAL

## 2025-05-05 ENCOUNTER — HOSPITAL ENCOUNTER (OUTPATIENT)
Facility: MEDICAL CENTER | Age: 30
End: 2025-05-05
Attending: FAMILY MEDICINE
Payer: COMMERCIAL

## 2025-05-05 ENCOUNTER — OFFICE VISIT (OUTPATIENT)
Dept: MEDICAL GROUP | Facility: LAB | Age: 30
End: 2025-05-05
Payer: COMMERCIAL

## 2025-05-05 VITALS
OXYGEN SATURATION: 95 % | TEMPERATURE: 98.4 F | BODY MASS INDEX: 26.45 KG/M2 | DIASTOLIC BLOOD PRESSURE: 80 MMHG | HEART RATE: 110 BPM | HEIGHT: 58 IN | WEIGHT: 126 LBS | SYSTOLIC BLOOD PRESSURE: 110 MMHG | RESPIRATION RATE: 16 BRPM

## 2025-05-05 DIAGNOSIS — Z11.3 SCREENING EXAMINATION FOR STD (SEXUALLY TRANSMITTED DISEASE): ICD-10-CM

## 2025-05-05 DIAGNOSIS — N76.0 BV (BACTERIAL VAGINOSIS): ICD-10-CM

## 2025-05-05 DIAGNOSIS — B96.89 BV (BACTERIAL VAGINOSIS): ICD-10-CM

## 2025-05-05 PROCEDURE — 99213 OFFICE O/P EST LOW 20 MIN: CPT | Performed by: FAMILY MEDICINE

## 2025-05-05 PROCEDURE — 87591 N.GONORRHOEAE DNA AMP PROB: CPT

## 2025-05-05 PROCEDURE — 3079F DIAST BP 80-89 MM HG: CPT | Performed by: FAMILY MEDICINE

## 2025-05-05 PROCEDURE — 87660 TRICHOMONAS VAGIN DIR PROBE: CPT

## 2025-05-05 PROCEDURE — 87480 CANDIDA DNA DIR PROBE: CPT

## 2025-05-05 PROCEDURE — RXMED WILLOW AMBULATORY MEDICATION CHARGE: Performed by: FAMILY MEDICINE

## 2025-05-05 PROCEDURE — 3074F SYST BP LT 130 MM HG: CPT | Performed by: FAMILY MEDICINE

## 2025-05-05 PROCEDURE — 87491 CHLMYD TRACH DNA AMP PROBE: CPT

## 2025-05-05 PROCEDURE — 87510 GARDNER VAG DNA DIR PROBE: CPT

## 2025-05-05 RX ORDER — METRONIDAZOLE 7.5 MG/G
1 GEL VAGINAL DAILY
Qty: 70 G | Refills: 1 | Status: SHIPPED | OUTPATIENT
Start: 2025-05-05 | End: 2025-05-12

## 2025-05-05 ASSESSMENT — FIBROSIS 4 INDEX: FIB4 SCORE: 0.49

## 2025-05-05 NOTE — PROGRESS NOTES
"Subjective:     Chief Complaint   Patient presents with    Vaginitis    Sexually Transmitted Diseases         HPI:   Isa presents today for concern for vaginitis.   Iud present.   More discharge, some odor.   Recent new partner.     Sometimes cramping or pain in the pelvis. No pain with intercourse.   H/o recurrent BV.       Current Outpatient Medications Ordered in Epic   Medication Sig Dispense Refill    amphetamine-dextroamphetamine (ADDERALL) 30 MG tablet Take 1 Tablet by mouth 2 times a day for 30 days. 60 Tablet 0    clonazePAM (KLONOPIN) 1 MG Tab Take 1 Tablet by mouth every evening for 30 days. 30 Tablet 0    buPROPion (WELLBUTRIN XL) 150 MG XL tablet Take 1 Tablet by mouth every morning. 90 Tablet 1    levonorgestrel (MIRENA, 52 MG,) 20 MCG/DAY IUD 1 Each by Intrauterine route one time.      albuterol 108 (90 Base) MCG/ACT Aero Soln inhalation aerosol Inhale 2 Puffs every 6 hours as needed for Shortness of Breath. 8.5 g 3     No current [facility]-ordered facility-administered medications on file.         ROS:  Gen: no fevers/chills, no changes in weight  Eyes: no changes in vision  ENT: no sore throat, no hearing loss, no bloody nose  Pulm: no sob, no cough  CV: no chest pain, no palpitations  GI: no nausea/vomiting, no diarrhea  MSk: no myalgias  Skin: no rash  Neuro: no headaches, no numbness/tingling  Heme/Lymph: no easy bruising      Objective:     Exam:  /80   Pulse (!) 110   Temp 36.9 °C (98.4 °F)   Resp 16   Ht 1.473 m (4' 10\")   Wt 57.2 kg (126 lb)   SpO2 95%   BMI 26.33 kg/m²  Body mass index is 26.33 kg/m².    Gen: AAOx3, NAD, well appearing  HEENT: NCAT, EOMI, Nares patent, Mucosa moist  Resp: Normal chest wall rise and fall, not SOB, no tachypnea  Skin: no rash or abnormality of visible skin.   Psych: normal speech, not slurred, good insight, affect full  MSK: Moves all four limbs equally and normally, gait normal  : Normal externally.  Vaginal vault with thick mucus present.  Some " whitish discharge.  No cervical motion tenderness.  IUD strings visualized at os.  Swabs collected today.    Assessment & Plan:     29 y.o. female with the following -     1. Screening examination for STD (sexually transmitted disease)  Discussed screening for STDs in general.  Discussed safe sex practices.  - Chlamydia/GC, PCR (Genital/Anal swab); Future  - VAGINAL PATHOGENS DNA PANEL; Future    2. BV (bacterial vaginosis)  Discussed what looks like possible bacterial vaginosis.  Discussed using vaginal gel to treat this.  We also discussed potentially if this kit seems to be after intercourse with the same partner that we may need to consider treating the partner as newer evidence is showing this could be sexually transmitted.  - metronidazole (METROGEL) 0.75 % gel; Apply 1 Applicator topically every day for 5 days.  Dispense: 45 g; Refill: 1            No follow-ups on file.    Please note that this dictation was created using voice recognition software. I have made every reasonable attempt to correct obvious errors, but I expect that there are errors of grammar and possibly content that I did not discover before finalizing the note.

## 2025-05-06 ENCOUNTER — RESULTS FOLLOW-UP (OUTPATIENT)
Dept: MEDICAL GROUP | Facility: LAB | Age: 30
End: 2025-05-06
Payer: COMMERCIAL

## 2025-05-06 LAB
C TRACH DNA GENITAL QL NAA+PROBE: NEGATIVE
CANDIDA DNA VAG QL PROBE+SIG AMP: NEGATIVE
G VAGINALIS DNA VAG QL PROBE+SIG AMP: POSITIVE
N GONORRHOEA DNA GENITAL QL NAA+PROBE: NEGATIVE
SPECIMEN SOURCE: NORMAL
T VAGINALIS DNA VAG QL PROBE+SIG AMP: NEGATIVE

## 2025-05-07 ENCOUNTER — PHARMACY VISIT (OUTPATIENT)
Dept: PHARMACY | Facility: MEDICAL CENTER | Age: 30
End: 2025-05-07
Payer: COMMERCIAL

## 2025-05-12 DIAGNOSIS — F98.8 ATTENTION DEFICIT DISORDER (ADD) WITHOUT HYPERACTIVITY: ICD-10-CM

## 2025-05-12 RX ORDER — DEXTROAMPHETAMINE SACCHARATE, AMPHETAMINE ASPARTATE, DEXTROAMPHETAMINE SULFATE AND AMPHETAMINE SULFATE 7.5; 7.5; 7.5; 7.5 MG/1; MG/1; MG/1; MG/1
30 TABLET ORAL 2 TIMES DAILY
Qty: 60 TABLET | Refills: 0 | Status: SHIPPED | OUTPATIENT
Start: 2025-05-12 | End: 2025-06-11

## 2025-05-12 NOTE — TELEPHONE ENCOUNTER
Received request via: Patient    Was the patient seen in the last year in this department? Yes    Does the patient have an active prescription (recently filled or refills available) for medication(s) requested? No    Pharmacy Name: [facility]    Does the patient have retirement Plus and need 100-day supply? (This applies to ALL medications) Patient does not have SCP

## 2025-05-21 ENCOUNTER — APPOINTMENT (OUTPATIENT)
Dept: URGENT CARE | Facility: CLINIC | Age: 30
End: 2025-05-21
Payer: COMMERCIAL

## 2025-05-21 DIAGNOSIS — F51.01 PRIMARY INSOMNIA: ICD-10-CM

## 2025-05-21 PROCEDURE — RXMED WILLOW AMBULATORY MEDICATION CHARGE: Performed by: FAMILY MEDICINE

## 2025-05-21 RX ORDER — CLONAZEPAM 1 MG/1
1 TABLET ORAL NIGHTLY
Qty: 30 TABLET | Refills: 0 | Status: SHIPPED | OUTPATIENT
Start: 2025-05-21 | End: 2025-06-23

## 2025-05-21 NOTE — TELEPHONE ENCOUNTER
Received request via: Pharmacy    Was the patient seen in the last year in this department? Yes    Does the patient have an active prescription (recently filled or refills available) for medication(s) requested? No    Pharmacy Name: [facility]     Does the patient have [facility] and need 100-day supply? (This applies to ALL medications) Patient does not have SCP

## 2025-05-23 ENCOUNTER — OFFICE VISIT (OUTPATIENT)
Dept: URGENT CARE | Facility: CLINIC | Age: 30
End: 2025-05-23
Payer: COMMERCIAL

## 2025-05-23 ENCOUNTER — HOSPITAL ENCOUNTER (OUTPATIENT)
Facility: MEDICAL CENTER | Age: 30
End: 2025-05-23
Payer: COMMERCIAL

## 2025-05-23 VITALS
SYSTOLIC BLOOD PRESSURE: 122 MMHG | HEIGHT: 59 IN | HEART RATE: 111 BPM | BODY MASS INDEX: 25.4 KG/M2 | DIASTOLIC BLOOD PRESSURE: 76 MMHG | RESPIRATION RATE: 20 BRPM | WEIGHT: 126 LBS | OXYGEN SATURATION: 98 % | TEMPERATURE: 98.4 F

## 2025-05-23 DIAGNOSIS — R30.0 DYSURIA: ICD-10-CM

## 2025-05-23 DIAGNOSIS — R39.9 UTI SYMPTOMS: Primary | ICD-10-CM

## 2025-05-23 LAB
APPEARANCE UR: NORMAL
BILIRUB UR STRIP-MCNC: NEGATIVE MG/DL
COLOR UR AUTO: YELLOW
GLUCOSE UR STRIP.AUTO-MCNC: NEGATIVE MG/DL
KETONES UR STRIP.AUTO-MCNC: NEGATIVE MG/DL
LEUKOCYTE ESTERASE UR QL STRIP.AUTO: NORMAL
NITRITE UR QL STRIP.AUTO: POSITIVE
PH UR STRIP.AUTO: 7 [PH] (ref 5–8)
POCT INT CON NEG: NEGATIVE
POCT INT CON POS: POSITIVE
POCT URINE PREGNANCY TEST: NEGATIVE
PROT UR QL STRIP: NORMAL MG/DL
RBC UR QL AUTO: NORMAL
SP GR UR STRIP.AUTO: 1.02
UROBILINOGEN UR STRIP-MCNC: NORMAL MG/DL

## 2025-05-23 PROCEDURE — 87086 URINE CULTURE/COLONY COUNT: CPT

## 2025-05-23 PROCEDURE — 81025 URINE PREGNANCY TEST: CPT

## 2025-05-23 PROCEDURE — 87186 SC STD MICRODIL/AGAR DIL: CPT

## 2025-05-23 PROCEDURE — 99214 OFFICE O/P EST MOD 30 MIN: CPT

## 2025-05-23 PROCEDURE — 87480 CANDIDA DNA DIR PROBE: CPT

## 2025-05-23 PROCEDURE — 3078F DIAST BP <80 MM HG: CPT

## 2025-05-23 PROCEDURE — 3074F SYST BP LT 130 MM HG: CPT

## 2025-05-23 PROCEDURE — RXMED WILLOW AMBULATORY MEDICATION CHARGE

## 2025-05-23 PROCEDURE — 81002 URINALYSIS NONAUTO W/O SCOPE: CPT

## 2025-05-23 PROCEDURE — 87510 GARDNER VAG DNA DIR PROBE: CPT

## 2025-05-23 PROCEDURE — 87077 CULTURE AEROBIC IDENTIFY: CPT

## 2025-05-23 PROCEDURE — 87660 TRICHOMONAS VAGIN DIR PROBE: CPT

## 2025-05-23 RX ORDER — NITROFURANTOIN 25; 75 MG/1; MG/1
100 CAPSULE ORAL 2 TIMES DAILY
Qty: 10 CAPSULE | Refills: 0 | Status: SHIPPED | OUTPATIENT
Start: 2025-05-23 | End: 2025-05-29

## 2025-05-23 ASSESSMENT — FIBROSIS 4 INDEX: FIB4 SCORE: 0.49

## 2025-05-24 ENCOUNTER — PHARMACY VISIT (OUTPATIENT)
Dept: PHARMACY | Facility: MEDICAL CENTER | Age: 30
End: 2025-05-24
Payer: COMMERCIAL

## 2025-05-24 DIAGNOSIS — R39.9 UTI SYMPTOMS: ICD-10-CM

## 2025-05-24 DIAGNOSIS — R30.0 DYSURIA: ICD-10-CM

## 2025-05-24 NOTE — PROGRESS NOTES
"Chief Complaint   Patient presents with    UTI     X 3 days. Burning in urination/ frequency/ pain in urination          Subjective:   HISTORY OF PRESENT ILLNESS: Isa Mcclure is a 29 y.o. female who presents for dysuria, frequency and urgency x 4 days. No  itching or STD concerns at this time but she does report that she continues to have vaginal discharge for which she was treated with vaginal suppositories for BV about 2 weeks ago.  She reports that her symptoms really never improved after treatment.  Patient denies fevers, flank pain, pelvic pain, she is tolerating PO but has had nausea.       Medications, Allergies, current problem list, Social and Family history reviewed today in Epic.     Objective:     /76   Pulse (!) 111   Temp 36.9 °C (98.4 °F) (Temporal)   Resp 20   Ht 1.499 m (4' 11\")   Wt 57.2 kg (126 lb)   SpO2 98%     Physical Exam  Vitals reviewed.   Constitutional:       General: She is not in acute distress.     Appearance: Normal appearance. She is not ill-appearing or toxic-appearing.   HENT:      Mouth/Throat:      Mouth: Mucous membranes are moist.   Cardiovascular:      Rate and Rhythm: Normal rate.   Pulmonary:      Effort: Pulmonary effort is normal.   Abdominal:      Palpations: Abdomen is soft.      Tenderness: There is abdominal tenderness in the suprapubic area. There is no right CVA tenderness, left CVA tenderness, guarding or rebound.   Genitourinary:     Comments: Deferred per pt  Skin:     General: Skin is warm and dry.      Capillary Refill: Capillary refill takes less than 2 seconds.   Neurological:      Mental Status: She is alert and oriented to person, place, and time.   Psychiatric:         Mood and Affect: Mood normal.        Assessment/Plan:     Diagnosis and associated orders    I personally reviewed prior external notes and test results pertinent to today's visit.     1. UTI symptoms  POCT Urinalysis    POCT Pregnancy    nitrofurantoin (MACROBID) 100 MG " Cap    VAGINAL PATHOGENS DNA PANEL    URINE CULTURE(NEW)      2. Dysuria  POCT Pregnancy    nitrofurantoin (MACROBID) 100 MG Cap    VAGINAL PATHOGENS DNA PANEL    URINE CULTURE(NEW)            IMPRESSION:  Pt presents with painful urination and vaginal discharge.  The patient is well appearing here with reassuring vitals signs.   Symptomatology is consistent with UTI.  She does have positive nitrites leukocytes and blood in the UA.  I will also repeat her vaginal pathogen swab as she reports no resolutions of her symptoms after treatment about 2 weeks ago.  At that time STD testing was negative discussed anticipated duration of illness, return to work/school, and indications to RTC or go to the Emergency department.    Patient states understanding of the plan of care and discharge instructions.  They are discharged in stable condition.         Please note that this dictation was created using voice recognition software. I have made a reasonable attempt to correct obvious errors, but I expect that there are errors of grammar and possibly content that I did not discover before finalizing the note.    This note was electronically signed by MILAN Vance

## 2025-05-25 ENCOUNTER — RESULTS FOLLOW-UP (OUTPATIENT)
Dept: URGENT CARE | Facility: CLINIC | Age: 30
End: 2025-05-25
Payer: COMMERCIAL

## 2025-05-25 DIAGNOSIS — N76.0 BV (BACTERIAL VAGINOSIS): Primary | ICD-10-CM

## 2025-05-25 DIAGNOSIS — B96.89 BV (BACTERIAL VAGINOSIS): Primary | ICD-10-CM

## 2025-05-25 PROCEDURE — RXMED WILLOW AMBULATORY MEDICATION CHARGE

## 2025-05-25 RX ORDER — METRONIDAZOLE 500 MG/1
500 TABLET ORAL 2 TIMES DAILY
Qty: 14 TABLET | Refills: 0 | Status: SHIPPED | OUTPATIENT
Start: 2025-05-25 | End: 2025-06-02

## 2025-05-26 ENCOUNTER — PHARMACY VISIT (OUTPATIENT)
Dept: PHARMACY | Facility: MEDICAL CENTER | Age: 30
End: 2025-05-26
Payer: COMMERCIAL

## 2025-06-07 ENCOUNTER — HOSPITAL ENCOUNTER (OUTPATIENT)
Facility: MEDICAL CENTER | Age: 30
End: 2025-06-07
Payer: COMMERCIAL

## 2025-06-07 ENCOUNTER — PHARMACY VISIT (OUTPATIENT)
Dept: PHARMACY | Facility: MEDICAL CENTER | Age: 30
End: 2025-06-07
Payer: COMMERCIAL

## 2025-06-07 ENCOUNTER — OFFICE VISIT (OUTPATIENT)
Dept: URGENT CARE | Facility: CLINIC | Age: 30
End: 2025-06-07
Payer: COMMERCIAL

## 2025-06-07 VITALS
WEIGHT: 118 LBS | SYSTOLIC BLOOD PRESSURE: 112 MMHG | OXYGEN SATURATION: 100 % | TEMPERATURE: 97.4 F | RESPIRATION RATE: 16 BRPM | DIASTOLIC BLOOD PRESSURE: 78 MMHG | BODY MASS INDEX: 24.77 KG/M2 | HEIGHT: 58 IN | HEART RATE: 103 BPM

## 2025-06-07 DIAGNOSIS — N39.0 RECURRENT UTI: ICD-10-CM

## 2025-06-07 DIAGNOSIS — R11.2 NAUSEA AND VOMITING, UNSPECIFIED VOMITING TYPE: ICD-10-CM

## 2025-06-07 DIAGNOSIS — N39.0 RECURRENT UTI: Primary | ICD-10-CM

## 2025-06-07 LAB
APPEARANCE UR: CLEAR
BILIRUB UR STRIP-MCNC: NEGATIVE MG/DL
COLOR UR AUTO: YELLOW
GLUCOSE UR STRIP.AUTO-MCNC: NEGATIVE MG/DL
KETONES UR STRIP.AUTO-MCNC: NEGATIVE MG/DL
LEUKOCYTE ESTERASE UR QL STRIP.AUTO: ABNORMAL
NITRITE UR QL STRIP.AUTO: POSITIVE
PH UR STRIP.AUTO: 8.5 [PH] (ref 5–8)
POCT INT CON NEG: NEGATIVE
POCT INT CON POS: POSITIVE
POCT URINE PREGNANCY TEST: NEGATIVE
PROT UR QL STRIP: 100 MG/DL
RBC UR QL AUTO: ABNORMAL
SP GR UR STRIP.AUTO: 1.02
UROBILINOGEN UR STRIP-MCNC: 0.2 MG/DL

## 2025-06-07 PROCEDURE — 3074F SYST BP LT 130 MM HG: CPT

## 2025-06-07 PROCEDURE — 3078F DIAST BP <80 MM HG: CPT

## 2025-06-07 PROCEDURE — 99214 OFFICE O/P EST MOD 30 MIN: CPT

## 2025-06-07 PROCEDURE — 81002 URINALYSIS NONAUTO W/O SCOPE: CPT

## 2025-06-07 PROCEDURE — 87186 SC STD MICRODIL/AGAR DIL: CPT

## 2025-06-07 PROCEDURE — 81025 URINE PREGNANCY TEST: CPT

## 2025-06-07 PROCEDURE — RXMED WILLOW AMBULATORY MEDICATION CHARGE

## 2025-06-07 PROCEDURE — 87086 URINE CULTURE/COLONY COUNT: CPT

## 2025-06-07 RX ORDER — CIPROFLOXACIN 500 MG/1
500 TABLET, FILM COATED ORAL EVERY 12 HOURS
Qty: 14 TABLET | Refills: 0 | Status: SHIPPED | OUTPATIENT
Start: 2025-06-07 | End: 2025-06-14

## 2025-06-07 RX ORDER — ONDANSETRON 4 MG/1
4 TABLET, ORALLY DISINTEGRATING ORAL EVERY 6 HOURS PRN
Qty: 20 TABLET | Refills: 0 | Status: SHIPPED | OUTPATIENT
Start: 2025-06-07

## 2025-06-07 ASSESSMENT — ENCOUNTER SYMPTOMS
NAUSEA: 1
FEVER: 0
VOMITING: 1

## 2025-06-07 ASSESSMENT — FIBROSIS 4 INDEX: FIB4 SCORE: 0.49

## 2025-06-07 NOTE — PROGRESS NOTES
Subjective:     CHIEF COMPLAINT  Chief Complaint   Patient presents with    UTI     UTI Follow up, symptoms worsening, vomiting,  severe urgency to pee, hurts to pee. Nausea,        HPI  Isa Mcclure is a very pleasant 29 y.o. female who presents with painful urination, increased urinary urgency, and increased urinary frequency that has been present for approximately 6 days.  She was previously seen in the urgent care on 5/23/2025 with acute cystitis.  She was initiated on Macrobid at that time.  She had her urine cultured with positive growth of ESBL with good sensitivity to Macrobid.  She also tested positive for bacterial vaginosis and was initiated on oral metronidazole.  She completed both the Macrobid and the metronidazole as instructed.  She reports that her UTI symptoms improved for approximately 2 days, but then returned.  Her bacterial vaginosis symptoms have completely resolved.  She has been experiencing painful urination, suprapubic aching, nausea, and vomiting.  The nausea and vomiting have been present for the past 4 days.  She denies any flank pain or fevers measured at home.    REVIEW OF SYSTEMS  Review of Systems   Constitutional:  Negative for fever.   Gastrointestinal:  Positive for nausea and vomiting.   Genitourinary:  Positive for dysuria, frequency and urgency.       PAST MEDICAL HISTORY  Patient Active Problem List    Diagnosis Date Noted    Insomnia 04/05/2022    Mild persistent asthma without complication 10/11/2021    Painful menstruation 08/14/2020    Heavy menses 07/30/2020    Amenorrhea 08/27/2019    Eczema 04/17/2019    Severe episode of recurrent major depressive disorder (HCC) 01/13/2017    PTSD (post-traumatic stress disorder) 01/13/2017       SURGICAL HISTORY   has a past surgical history that includes cholecystectomy.    ALLERGIES  Allergies[1]    CURRENT MEDICATIONS  Home Medications       Reviewed by Ifeoma Bauman P.A.-C. (Physician Assistant) on 06/07/25 at 7181   "Med List Status: <None>     Medication Last Dose Status   albuterol 108 (90 Base) MCG/ACT Aero Soln inhalation aerosol PRN Active   amphetamine-dextroamphetamine (ADDERALL) 30 MG tablet Taking Active   buPROPion (WELLBUTRIN XL) 150 MG XL tablet Taking Active   clonazePAM (KLONOPIN) 1 MG Tab Taking Active   levonorgestrel (MIRENA, 52 MG,) 20 MCG/DAY IUD Taking Active                    SOCIAL HISTORY  Social History     Tobacco Use    Smoking status: Former     Types: Cigarettes    Smokeless tobacco: Never   Vaping Use    Vaping status: Every Day    Substances: Nicotine   Substance and Sexual Activity    Alcohol use: Not Currently    Drug use: Not Currently    Sexual activity: Yes     Partners: Male     Birth control/protection: I.U.D.     Comment: none       FAMILY HISTORY  Family History   Problem Relation Age of Onset    Hypertension Mother     Diabetes Mother     Hyperlipidemia Mother     Heart Disease Mother 60        MI    Psychiatric Illness Mother         depression, ADD    Hypertension Father     Hyperlipidemia Father     Psychiatric Illness Brother         depression    No Known Problems Sister     No Known Problems Maternal Aunt     No Known Problems Brother     No Known Problems Brother     No Known Problems Sister     No Known Problems Sister     Hypertension Paternal Grandmother           Objective:     VITAL SIGNS: /78   Pulse (!) 103   Temp 36.3 °C (97.4 °F) (Temporal)   Resp 16   Ht 1.473 m (4' 10\")   Wt 53.5 kg (118 lb)   SpO2 100%   BMI 24.66 kg/m²     PHYSICAL EXAM  Physical Exam  Vitals reviewed.   Constitutional:       General: She is not in acute distress.     Appearance: Normal appearance. She is not ill-appearing or toxic-appearing.   HENT:      Head: Normocephalic and atraumatic.      Nose: Nose normal.      Mouth/Throat:      Mouth: Mucous membranes are moist.   Eyes:      Conjunctiva/sclera: Conjunctivae normal.   Cardiovascular:      Rate and Rhythm: Tachycardia present. "   Pulmonary:      Effort: Pulmonary effort is normal.   Abdominal:      General: There is no distension.      Palpations: There is no mass.      Tenderness: There is abdominal tenderness in the suprapubic area. There is no right CVA tenderness, left CVA tenderness, guarding or rebound.      Hernia: No hernia is present.       Skin:     General: Skin is warm and dry.      Coloration: Skin is not pale.   Neurological:      General: No focal deficit present.      Mental Status: She is alert.   Psychiatric:         Mood and Affect: Mood normal.         Assessment/Plan:     1. Recurrent UTI  - POCT Urinalysis  - POCT Pregnancy  - URINE CULTURE(NEW); Future  - ciprofloxacin (CIPRO) 500 MG Tab; Take 1 Tablet by mouth every 12 hours for 7 days.  Dispense: 14 Tablet; Refill: 0    2. Nausea and vomiting, unspecified vomiting type  - ondansetron (ZOFRAN ODT) 4 MG TABLET DISPERSIBLE; Take 1 Tablet by mouth every 6 hours as needed for Nausea/Vomiting.  Dispense: 20 Tablet; Refill: 0      MDM/Comments:  Patient is displaying systemic symptoms including mild tachycardia on physical examination. These symptoms are likely contributed to acute cystitis with hematuria. Patient has stable vital signs and is non-toxic appearing.  Urinalysis obtained in office positive for trace intact blood, protein, nitrates, and leukocytes.  hCG negative.  Urine has been sent for culture.  Patient's most recent urine culture has been reviewed with positive growth of ESBL.  Given failure of nitrofurantoin, patient has been initiated on a 7-day course of ciprofloxacin for recurrent/complicated UTI.  She has also been provided Zofran for symptomatic relief of nausea and vomiting.  Ceftriaxone has not been provided given resistance found on urine culture.  Risks versus benefits of ciprofloxacin discussed with patient and she demonstrated understanding.  Patient will avoid heavy exercise as well as overly direct sun exposure.  Discussed supportive care  2 weeks with hydration, decreased alcohol and caffeine intake, avoidance of intercourse until UTI symptoms resolve. Patient instructed to complete full course of antibiotic.  Discussed that if symptoms are not improving and if nausea and vomiting persist she would need to be seen in the emergency department for further investigation into symptoms.  Patient demonstrated understanding of plan of care and will RTC as needed.      Differential diagnosis, natural history, supportive care, and indications for immediate follow-up discussed. All questions answered. Patient agrees with the plan of care.    Follow-up as needed if symptoms worsen or fail to improve to PCP, Urgent care or Emergency Room.    I have personally reviewed prior external notes and test results pertinent to today's visit.  I have independently reviewed and interpreted all diagnostics ordered during this urgent care acute visit.   Discussed management options (risks,benefits, and alternatives to treatment). Pt expresses understanding and the treatment plan was agreed upon. Questions were encouraged and answered to pt's satisfaction.    Please note that this dictation was created using voice recognition software. I have made a reasonable attempt to correct obvious errors, but I expect that there are errors of grammar and possibly content that I did not discover before finalizing the note.       [1]   Allergies  Allergen Reactions    Bactrim Ds Hives and Unspecified     hives    Hydrocodone Itching and Nausea

## 2025-06-09 ENCOUNTER — RESULTS FOLLOW-UP (OUTPATIENT)
Dept: URGENT CARE | Facility: PHYSICIAN GROUP | Age: 30
End: 2025-06-09

## 2025-06-09 DIAGNOSIS — F98.8 ATTENTION DEFICIT DISORDER (ADD) WITHOUT HYPERACTIVITY: ICD-10-CM

## 2025-06-09 RX ORDER — DEXTROAMPHETAMINE SACCHARATE, AMPHETAMINE ASPARTATE, DEXTROAMPHETAMINE SULFATE AND AMPHETAMINE SULFATE 7.5; 7.5; 7.5; 7.5 MG/1; MG/1; MG/1; MG/1
30 TABLET ORAL 2 TIMES DAILY
Qty: 60 TABLET | Refills: 0 | Status: SHIPPED | OUTPATIENT
Start: 2025-06-09 | End: 2025-07-09

## 2025-06-09 NOTE — TELEPHONE ENCOUNTER
Received request via: Patient    Was the patient seen in the last year in this department? Yes    Does the patient have an active prescription (recently filled or refills available) for medication(s) requested? No    Pharmacy Name: [facility]    Does the patient have nursing home Plus and need 100-day supply? (This applies to ALL medications) Patient does not have SCP

## 2025-06-22 ENCOUNTER — HOSPITAL ENCOUNTER (EMERGENCY)
Facility: MEDICAL CENTER | Age: 30
End: 2025-06-22
Attending: EMERGENCY MEDICINE
Payer: COMMERCIAL

## 2025-06-22 ENCOUNTER — APPOINTMENT (OUTPATIENT)
Dept: RADIOLOGY | Facility: MEDICAL CENTER | Age: 30
End: 2025-06-22
Attending: EMERGENCY MEDICINE
Payer: COMMERCIAL

## 2025-06-22 VITALS
SYSTOLIC BLOOD PRESSURE: 118 MMHG | WEIGHT: 118.61 LBS | TEMPERATURE: 98.5 F | HEART RATE: 75 BPM | OXYGEN SATURATION: 98 % | RESPIRATION RATE: 16 BRPM | DIASTOLIC BLOOD PRESSURE: 79 MMHG | BODY MASS INDEX: 24.9 KG/M2 | HEIGHT: 58 IN

## 2025-06-22 DIAGNOSIS — K59.03 DRUG-INDUCED CONSTIPATION: Primary | ICD-10-CM

## 2025-06-22 PROCEDURE — 99285 EMERGENCY DEPT VISIT HI MDM: CPT

## 2025-06-22 PROCEDURE — 99152 MOD SED SAME PHYS/QHP 5/>YRS: CPT

## 2025-06-22 PROCEDURE — 700111 HCHG RX REV CODE 636 W/ 250 OVERRIDE (IP): Performed by: EMERGENCY MEDICINE

## 2025-06-22 PROCEDURE — A9270 NON-COVERED ITEM OR SERVICE: HCPCS | Performed by: EMERGENCY MEDICINE

## 2025-06-22 PROCEDURE — 74018 RADEX ABDOMEN 1 VIEW: CPT

## 2025-06-22 PROCEDURE — 700105 HCHG RX REV CODE 258: Performed by: EMERGENCY MEDICINE

## 2025-06-22 PROCEDURE — 94760 N-INVAS EAR/PLS OXIMETRY 1: CPT

## 2025-06-22 PROCEDURE — 700102 HCHG RX REV CODE 250 W/ 637 OVERRIDE(OP): Performed by: EMERGENCY MEDICINE

## 2025-06-22 PROCEDURE — 94799 UNLISTED PULMONARY SVC/PX: CPT

## 2025-06-22 RX ORDER — CIPROFLOXACIN 500 MG/1
500 TABLET, FILM COATED ORAL 2 TIMES DAILY
COMMUNITY

## 2025-06-22 RX ORDER — NITROFURANTOIN 25; 75 MG/1; MG/1
100 CAPSULE ORAL 2 TIMES DAILY
COMMUNITY

## 2025-06-22 RX ORDER — BISACODYL 10 MG
10 SUPPOSITORY, RECTAL RECTAL ONCE
Status: DISCONTINUED | OUTPATIENT
Start: 2025-06-22 | End: 2025-06-22 | Stop reason: HOSPADM

## 2025-06-22 RX ORDER — AMOXICILLIN 250 MG
1 CAPSULE ORAL DAILY
Qty: 30 TABLET | Refills: 1 | Status: SHIPPED | OUTPATIENT
Start: 2025-06-22

## 2025-06-22 RX ORDER — SODIUM CHLORIDE, SODIUM LACTATE, POTASSIUM CHLORIDE, AND CALCIUM CHLORIDE .6; .31; .03; .02 G/100ML; G/100ML; G/100ML; G/100ML
1000 INJECTION, SOLUTION INTRAVENOUS ONCE
Status: COMPLETED | OUTPATIENT
Start: 2025-06-22 | End: 2025-06-22

## 2025-06-22 RX ORDER — PROPOFOL 10 MG/ML
200 INJECTION, EMULSION INTRAVENOUS ONCE
Status: COMPLETED | OUTPATIENT
Start: 2025-06-22 | End: 2025-06-22

## 2025-06-22 RX ORDER — POLYETHYLENE GLYCOL 3350 17 G/17G
17 POWDER, FOR SOLUTION ORAL DAILY
Qty: 507 G | Refills: 1 | Status: SHIPPED | OUTPATIENT
Start: 2025-06-22

## 2025-06-22 RX ORDER — METRONIDAZOLE 500 MG/1
500 TABLET ORAL 2 TIMES DAILY
COMMUNITY

## 2025-06-22 RX ORDER — ACETAMINOPHEN 325 MG/1
650 TABLET ORAL ONCE
Status: COMPLETED | OUTPATIENT
Start: 2025-06-22 | End: 2025-06-22

## 2025-06-22 RX ADMIN — ACETAMINOPHEN 650 MG: 325 TABLET ORAL at 17:59

## 2025-06-22 RX ADMIN — SODIUM CHLORIDE, POTASSIUM CHLORIDE, SODIUM LACTATE AND CALCIUM CHLORIDE 1000 ML: 600; 310; 30; 20 INJECTION, SOLUTION INTRAVENOUS at 17:54

## 2025-06-22 RX ADMIN — PROPOFOL 125 MG: 10 INJECTION, EMULSION INTRAVENOUS at 17:42

## 2025-06-22 ASSESSMENT — PAIN DESCRIPTION - PAIN TYPE
TYPE: ACUTE PAIN
TYPE: ACUTE PAIN

## 2025-06-22 ASSESSMENT — FIBROSIS 4 INDEX: FIB4 SCORE: 0.49

## 2025-06-22 NOTE — ED PROVIDER NOTES
"Procedure number ED Provider Note    Scribed for Mickey Benson M.D. by Michael Minaya.. 6/22/2025  2:24 PM    Primary care provider: Vivian Romano M.D.  Means of arrival: Walk-In    CHIEF COMPLAINT  Chief Complaint   Patient presents with    Constipation     Reports LNBM 1 month ago. Reports she is not passing gas and is vomiting as well.     Rectal Pain     7/10 \"it's right there\". States she feels that stool is \"ready to come out\" but she is unable to pass this.        EXTERNAL RECORDS REVIEWED  Clinic note reviewed from June 7 for UTI symptoms.  She had been treated with Macrobid in May.  Urine culture from May grew ESBL.  She also had bacterial vaginosis and was treated with oral metronidazole.  She was treated with a second course of ciprofloxacin.  June 7 urine culture grew pansensitive E. coli.    MAURIZIO Mcclure is a 29 y.o. female who presents to the Emergency Department for evaluation of constipation onset a month ago, worsening over the last 3 days with associated vomiting, bloating and cramping abdominal and rectal pain. Endorses fever last night without cough. Patient reports that she has tried magnesium citrate, Miralax and stool softeners with no alleviation. Last bowel movement was this morning, and she describes it as hard and large. Denies any opiates or antihistamines.Patient has been vomiting twice a day, her last emesis being this morning. Denies any burning with urination or cough. Denies history of intestinal blockage or abdominal surgeries. Patient states there is no chance of being pregnant.    She denies any recent URI cough or shortness of breath.  No history of adverse effects to anesthesia.  No family history of adverse effects to anesthesia.    LIMITATION TO HISTORY   None    OUTSIDE HISTORIAN(S):  Patient's mother is present at bedside, providing additional information about which medications the patient is taking.    PAST MEDICAL HISTORY  Past Medical " "History[1]    FAMILY HISTORY  Family History   Problem Relation Age of Onset    Hypertension Mother     Diabetes Mother     Hyperlipidemia Mother     Heart Disease Mother 60        MI    Psychiatric Illness Mother         depression, ADD    Hypertension Father     Hyperlipidemia Father     Psychiatric Illness Brother         depression    No Known Problems Sister     No Known Problems Maternal Aunt     No Known Problems Brother     No Known Problems Brother     No Known Problems Sister     No Known Problems Sister     Hypertension Paternal Grandmother        SOCIAL HISTORY  Social History[2]  Social History     Substance and Sexual Activity   Drug Use Not Currently       SURGICAL HISTORY  Past Surgical History[3]    CURRENT MEDICATIONS  Current Medications[4]    ALLERGIES  Allergies[5]    PHYSICAL EXAM  VITAL SIGNS: /87   Pulse (!) 127   Temp 36.9 °C (98.5 °F) (Temporal)   Resp 20   Ht 1.473 m (4' 10\")   Wt 53.8 kg (118 lb 9.7 oz)   LMP  (LMP Unknown)   SpO2 99%   BMI 24.79 kg/m²   Reviewed and tachycardic afebrile  Constitutional: Well developed, Well nourished.  HENT: Normocephalic, atraumatic, bilateral external ears normal, No intraoral erythema, edema, exudate  Eyes: PERRLA, conjunctiva pink, no scleral icterus.   Cardiovascular: Regular rate and rhythm. No murmurs, rubs or gallops.  No dependent edema or calf tenderness  Respiratory: Lungs clear to auscultation bilaterally. No wheezes, rales, or rhonchi.  Abdominal:  Abdomen soft, non distended. No rebound, or guarding.  Good bowel sounds, no tympanicity, no mass, generalized mild tenderness.  Skin: No erythema, no rash. No wounds or bruising.  Genitourinary: No costovertebral angle tenderness.   Musculoskeletal: no deformities.   Neurologic: Alert & oriented x 3, cranial nerves 2-12 intact by passive exam.  No focal deficit noted.  Psychiatric: Affect normal, Judgment normal, Mood normal.     LABS Ordered and Reviewed by Me:      RADIOLOGY  I " have independently interpreted the abdominal x-ray associated with this visit demonstrating large collection of rectosigmoid stool and distended loops of small bowel without air-fluid levels.  I am awaiting the final reading from the radiologist.     Final Radiology Report  [id number]   Final Result      Prominent air-filled loops of small bowel in the right upper quadrant may be due to focal ileus or early/partial small bowel obstruction.   Abundant stool in the rectum is suggestive of constipation.        Radiologist interpretation have been reviewed by me.     COURSE & MEDICAL DECISION MAKING  2:24 PM - Patient seen and examined at bedside. Vitals notable for tachycardia.    INTERVENTIONS BY ME:  Soapsuds enema  LR 1 L bolus     - On reassessment response to intervention patient had had a small bowel movement.  On rectal exam there was a small soft ball of stool in the vault.  This could not be removed due to poor patient tolerance.  X-ray was ordered at this time.    Procedure: Procedural sedation    For preprocedural history and physical please see this history and physical.  Mallampati score was a 1.  ASA class is a 1.  Risk and benefits explained and understood by patient.  Consent obtained.  Start of sedation was 17:23.  Timeout was performed.  Indication mechanical disimpaction of stool.  Patient was placed on cardiac CO2 and pulse oximetry monitoring.  She was given oxygen by nasal cannula.  She was given IV propofol 125 mg.  This resulted in good sedation.  She had bradypnea without hypoxia.  She tolerated the procedure well.  End of sedation at 17:42.    Vitals:    06/22/25 1910   BP: 118/79   Pulse: 75   Resp: 16   Temp:    SpO2: 98%     Procedure #2: Mechanical disimpaction.  Indication constipation.  The patient was mechanically disimpacted digitally of a moderately large amount of stool.  There was a scant amount of red blood in the stool which had been present prior to the attempt at  disimpaction.  The patient tolerated the procedure well.    ASSESSMENT, COURSE AND PLAN:  PROBLEMS EVALUATED THIS VISIT:    This patient recently on Ozempic for the last 2 months presents with abdominal pain and constipation.  She has some dilated loops of bowel on x-ray without evidence of obstruction.  She may have ileus or early obstruction.  She failed initial soapsuds enema but responded to digital disimpaction.  She has been using inadequate stool softeners and stimulants and will start them daily.  She will discontinue Ozempic.  There is no evidence that the patient has diverticulitis Onikul exam.    Measures: HYDRATION: Intravenous fluids were medically necessary given constipation.       DISPOSITION AND DISCUSSIONS      RISK:  Low    PLAN:  Discharge Medication List as of 6/22/2025  7:13 PM        START taking these medications    Details   polyethylene glycol 3350 (MIRALAX) 17 GM/SCOOP Powder Take 17 g by mouth every day. Mixed in 1 cup water, Disp-507 g, R-1, Normal      senna-docusate (PERICOLACE OR SENOKOT S) 8.6-50 MG Tab Take 1 Tablet by mouth every day., Disp-30 Tablet, R-1, Normal           Fluids fruit and exercise  Constipation handout given    Return for abdominal pain bloating vomiting fever    Followup:  Vivian Romano M.D.  [address] [id number] [phone]    Schedule an appointment as soon as possible for a visit in 1 week  As needed      CONDITION: Stable, improved.     FINAL IMPRESSION  1. Drug-induced constipation    Procedural sedation propofol 19 minutes  Manual disimpaction of stool      The note accurately reflects work and decisions made by me.  Mickey Benson M.D.  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    Current Outpatient Medications:     nitrofurantoin (MACROBID) 100 MG Cap, Take 100 mg by mouth 2 times a day., Disp: , Rfl:     metroNIDAZOLE (FLAGYL) 500 MG Tab, Take 500 mg by mouth every 8 hours., Disp: , Rfl:     amphetamine-dextroamphetamine (ADDERALL) 30 MG tablet, Take 1 Tablet by mouth 2 times a day for 30 days., Disp: 60 Tablet, Rfl: 0    ondansetron (ZOFRAN ODT) 4 MG TABLET DISPERSIBLE, Dissolve 1 Tablet by mouth every 6 hours as needed for Nausea/Vomiting., Disp: 20 Tablet, Rfl: 0    clonazePAM (KLONOPIN) 1 MG Tab, Take 1 Tablet by mouth every evening for 30 days., Disp: 30 Tablet, Rfl: 0    buPROPion (WELLBUTRIN XL) 150 MG XL tablet, Take 1 Tablet by mouth every morning., Disp: 90 Tablet, Rfl: 1    levonorgestrel (MIRENA, 52 MG,) 20 MCG/DAY IUD, 1 Each by Intrauterine route one time., Disp: , Rfl:     albuterol 108 (90 Base) MCG/ACT Aero Soln inhalation aerosol, Inhale 2 Puffs every 6 hours as needed for Shortness of Breath., Disp: 8.5 g, Rfl: 3  [5]   Allergies  Allergen Reactions    Bactrim Ds Hives and Unspecified     hives    Hydrocodone Itching and Nausea

## 2025-06-22 NOTE — ED TRIAGE NOTES
"Chief Complaint   Patient presents with    Constipation     Reports LNBM 1 month ago. Reports she is not passing gas and is vomiting as well.     Rectal Pain     7/10 \"it's right there\". States she feels that stool is \"ready to come out\" but she is unable to pass this.      Physical Exam  Pulmonary:      Effort: Pulmonary effort is normal.   Skin:     General: Skin is warm and dry.   Neurological:      Mental Status: She is alert.       /87   Pulse (!) 127   Temp 36.9 °C (98.5 °F) (Temporal)   Resp 20   Ht 1.473 m (4' 10\")   Wt 53.8 kg (118 lb 9.7 oz)   LMP  (LMP Unknown)   SpO2 99%   BMI 24.79 kg/m²     "

## 2025-06-22 NOTE — ED NOTES
Medication history reviewed with pt. Med rec is complete.  Allergies reviewed, per pt  Interviewed pt with mother at bedside with permission from pt.    Pt is not sure the strength of her SEMAGLUTIDE that she takes every Monday.  Per pt thinks she injects 15 units (1.5ml). Pt reports that she has not used her ALBUTEROL inhaler in the last 2 months.    Pt started NITROFURANTOIN 100MG on 5/24/2025 for 5 day course, METRONIDAZOLE 500MG on 5/26/2025 for 7 day course, and CIPRO 500MG  on 6/7/2025 7 day course.  Per pt reports that she finished all 3 antibiotics     Pt is not on any anticoagulants      Dispense history available in EPIC? Some medications

## 2025-06-23 ENCOUNTER — APPOINTMENT (OUTPATIENT)
Dept: MEDICAL GROUP | Facility: LAB | Age: 30
End: 2025-06-23
Payer: COMMERCIAL

## 2025-06-23 NOTE — DISCHARGE INSTRUCTIONS
Take MiraLAX daily and add senna as needed for constipation.  Stop the Ozempic.  Stay well-hydrated.  Eat fruits and fiber.  Exercise daily.  Return for abdominal pain vomiting or other concerning symptoms.  See your doctor if not better in a week.

## 2025-06-23 NOTE — ED NOTES
PT cleared for discharge home pt has no further questions or concerns  pt to f/u with pcp 1-2days pt verbalized understanding. pt advised to return to closest ED for any worsenign symptotms  Mother to drive pt home WC provided PIV removed vss upon DC

## 2025-06-23 NOTE — ED NOTES
Pratibha Shell, RN and Delisa ROACH at bedside.   Mother at bedside.    1724 time out - disimpaction   1725 70 mg propofol administered   1727 10 mg propofol administered   1729 10 mg propofol administered  1732 10 mg propofol administered  1735 10 mg propofol administered  1739 15 mg propofol administered    Total administered 125 mg propofol   Patient tolerated procedure well    1747 procedure ended - MD left room  1750 Drew baseline

## 2025-07-08 ENCOUNTER — PATIENT MESSAGE (OUTPATIENT)
Dept: MEDICAL GROUP | Facility: LAB | Age: 30
End: 2025-07-08
Payer: COMMERCIAL

## 2025-07-08 DIAGNOSIS — F51.01 PRIMARY INSOMNIA: ICD-10-CM

## 2025-07-08 RX ORDER — CLONAZEPAM 1 MG/1
1 TABLET ORAL NIGHTLY
Qty: 30 TABLET | Refills: 0 | Status: SHIPPED | OUTPATIENT
Start: 2025-07-08 | End: 2025-08-07